# Patient Record
Sex: MALE | Race: WHITE | NOT HISPANIC OR LATINO | Employment: OTHER | URBAN - METROPOLITAN AREA
[De-identification: names, ages, dates, MRNs, and addresses within clinical notes are randomized per-mention and may not be internally consistent; named-entity substitution may affect disease eponyms.]

---

## 2021-02-04 ENCOUNTER — OFFICE VISIT (OUTPATIENT)
Dept: INTERNAL MEDICINE | Facility: CLINIC | Age: 66
End: 2021-02-04

## 2021-02-04 ENCOUNTER — LAB (OUTPATIENT)
Dept: LAB | Facility: HOSPITAL | Age: 66
End: 2021-02-04

## 2021-02-04 VITALS
DIASTOLIC BLOOD PRESSURE: 92 MMHG | HEIGHT: 72 IN | SYSTOLIC BLOOD PRESSURE: 152 MMHG | OXYGEN SATURATION: 98 % | HEART RATE: 65 BPM | TEMPERATURE: 97.5 F | WEIGHT: 227.2 LBS | BODY MASS INDEX: 30.77 KG/M2

## 2021-02-04 DIAGNOSIS — Z11.59 NEED FOR HEPATITIS C SCREENING TEST: ICD-10-CM

## 2021-02-04 DIAGNOSIS — J30.1 SEASONAL ALLERGIC RHINITIS DUE TO POLLEN: ICD-10-CM

## 2021-02-04 DIAGNOSIS — E78.2 MIXED HYPERLIPIDEMIA: ICD-10-CM

## 2021-02-04 DIAGNOSIS — I10 ESSENTIAL HYPERTENSION: ICD-10-CM

## 2021-02-04 DIAGNOSIS — E11.9 TYPE 2 DIABETES MELLITUS WITHOUT COMPLICATION, WITHOUT LONG-TERM CURRENT USE OF INSULIN (HCC): ICD-10-CM

## 2021-02-04 DIAGNOSIS — E66.9 OBESITY (BMI 30.0-34.9): ICD-10-CM

## 2021-02-04 DIAGNOSIS — Z86.79 HISTORY OF CARDIAC MURMUR: ICD-10-CM

## 2021-02-04 DIAGNOSIS — E11.9 TYPE 2 DIABETES MELLITUS WITHOUT COMPLICATION, WITHOUT LONG-TERM CURRENT USE OF INSULIN (HCC): Primary | ICD-10-CM

## 2021-02-04 DIAGNOSIS — Z71.89 ADVANCE CARE PLANNING: ICD-10-CM

## 2021-02-04 DIAGNOSIS — K21.9 GASTROESOPHAGEAL REFLUX DISEASE WITHOUT ESOPHAGITIS: ICD-10-CM

## 2021-02-04 DIAGNOSIS — Z12.11 SCREEN FOR COLON CANCER: ICD-10-CM

## 2021-02-04 PROBLEM — E66.811 OBESITY (BMI 30.0-34.9): Status: ACTIVE | Noted: 2021-02-04

## 2021-02-04 LAB
ALBUMIN SERPL-MCNC: 4.6 G/DL (ref 3.5–5.2)
ALBUMIN/GLOB SERPL: 1.6 G/DL
ALP SERPL-CCNC: 34 U/L (ref 39–117)
ALT SERPL W P-5'-P-CCNC: 34 U/L (ref 1–41)
ANION GAP SERPL CALCULATED.3IONS-SCNC: 9.7 MMOL/L (ref 5–15)
AST SERPL-CCNC: 25 U/L (ref 1–40)
BILIRUB SERPL-MCNC: 0.3 MG/DL (ref 0–1.2)
BUN SERPL-MCNC: 17 MG/DL (ref 8–23)
BUN/CREAT SERPL: 16.8 (ref 7–25)
CALCIUM SPEC-SCNC: 9.9 MG/DL (ref 8.6–10.5)
CHLORIDE SERPL-SCNC: 103 MMOL/L (ref 98–107)
CHOLEST SERPL-MCNC: 193 MG/DL (ref 0–200)
CO2 SERPL-SCNC: 26.3 MMOL/L (ref 22–29)
CREAT SERPL-MCNC: 1.01 MG/DL (ref 0.76–1.27)
GFR SERPL CREATININE-BSD FRML MDRD: 74 ML/MIN/1.73
GLOBULIN UR ELPH-MCNC: 2.8 GM/DL
GLUCOSE SERPL-MCNC: 134 MG/DL (ref 65–99)
HBA1C MFR BLD: 7.1 % (ref 4.8–5.6)
HCV AB SER DONR QL: NORMAL
HDLC SERPL-MCNC: 59 MG/DL (ref 40–60)
LDLC SERPL CALC-MCNC: 115 MG/DL (ref 0–100)
LDLC/HDLC SERPL: 1.92 {RATIO}
POTASSIUM SERPL-SCNC: 4.5 MMOL/L (ref 3.5–5.2)
PROT SERPL-MCNC: 7.4 G/DL (ref 6–8.5)
SODIUM SERPL-SCNC: 139 MMOL/L (ref 136–145)
TRIGL SERPL-MCNC: 105 MG/DL (ref 0–150)
VLDLC SERPL-MCNC: 19 MG/DL (ref 5–40)

## 2021-02-04 PROCEDURE — 86803 HEPATITIS C AB TEST: CPT

## 2021-02-04 PROCEDURE — 80061 LIPID PANEL: CPT

## 2021-02-04 PROCEDURE — 82043 UR ALBUMIN QUANTITATIVE: CPT

## 2021-02-04 PROCEDURE — 83036 HEMOGLOBIN GLYCOSYLATED A1C: CPT

## 2021-02-04 PROCEDURE — 99204 OFFICE O/P NEW MOD 45 MIN: CPT | Performed by: STUDENT IN AN ORGANIZED HEALTH CARE EDUCATION/TRAINING PROGRAM

## 2021-02-04 PROCEDURE — 80053 COMPREHEN METABOLIC PANEL: CPT

## 2021-02-04 RX ORDER — EZETIMIBE 10 MG/1
10 TABLET ORAL DAILY
COMMUNITY
Start: 2021-01-07 | End: 2022-05-05 | Stop reason: SDUPTHER

## 2021-02-04 RX ORDER — GLIMEPIRIDE 2 MG/1
TABLET ORAL
COMMUNITY
Start: 2021-01-08 | End: 2021-06-04 | Stop reason: SDUPTHER

## 2021-02-04 RX ORDER — BLOOD SUGAR DIAGNOSTIC
STRIP MISCELLANEOUS
COMMUNITY
Start: 2021-01-11 | End: 2021-12-16 | Stop reason: SDUPTHER

## 2021-02-04 RX ORDER — MONTELUKAST SODIUM 10 MG/1
TABLET ORAL DAILY
COMMUNITY
Start: 2021-01-11 | End: 2021-08-09 | Stop reason: SDUPTHER

## 2021-02-04 RX ORDER — OMEPRAZOLE 20 MG/1
1 CAPSULE, DELAYED RELEASE ORAL DAILY
COMMUNITY
Start: 2021-01-19 | End: 2021-11-04 | Stop reason: SDUPTHER

## 2021-02-04 RX ORDER — LISINOPRIL 10 MG/1
2 TABLET ORAL DAILY
COMMUNITY
Start: 2021-01-19 | End: 2021-03-04 | Stop reason: DRUGHIGH

## 2021-02-04 NOTE — PROGRESS NOTES
History of Present Illness  Rohan Evans is a 65 y.o. male presenting for Diabetes (establish care ) and Hypertension.     Retired as  from the [employer]. . 2 children daughter born 1979 and son born 1981.    Patient is here to [facility].  Has a past medical history of T2DM without complications, hypertension, hyperlipidemia, GERD, systolic murmur and allergic rhinitis during spring/summer    Currently has no specific complaints.  Overall he feels well.  He never did EGD, but is on PPI for his GERD.    During the pollen season he takes montelukast for nasal congestion and itching of his eyes while mowing the lawn.  No history of asthma.    He was found to have a heart murmur likely early 20s.  He did echocardiogram and has done multiple stress test, he states all normal.  Does not follow with cardiology.    He was diagnosed with T2DM about 10 years ago, his last A1c was less than 7.  He was recently seen by eye doctor at [facility] in [place], he will sign for records release.    His last colonoscopy was on 10 years ago, but is normal.  He would like to have a new one done.    His blood pressure has been well controlled, he did not take today's medication, but states his home blood pressure measurements are consistently below 140/90.    The following portions of the patient's history were reviewed and updated as appropriate: allergies, current medications, past family history, past medical history, past social history, past surgical history and problem list.    Review of Systems   Constitutional: Negative for fever.   HENT: Negative for congestion.    Eyes: Negative for visual disturbance.   Respiratory: Negative for shortness of breath.    Cardiovascular: Negative for chest pain.   Gastrointestinal: Negative for abdominal pain.   Genitourinary: Negative for dysuria.   Skin: Negative for rash.   Neurological: Negative for weakness.   Psychiatric/Behavioral: Negative for depressed  "mood.     Outpatient Medications Marked as Taking for the 2/4/21 encounter (Office Visit) with Sav Mulligan MD   Medication Sig Dispense Refill   • ezetimibe (ZETIA) 10 MG tablet Take 10 mg by mouth Daily.     • glimepiride (AMARYL) 2 MG tablet TAKE 1 TABLET BY MOUTH TWICE A DAY 1/2 HOUR BEFORE MEALS.     • lisinopril (PRINIVIL,ZESTRIL) 10 MG tablet 2 tablets Daily.     • metFORMIN (GLUCOPHAGE) 500 MG tablet 1 tablet 2 (two) times a day.     • montelukast (SINGULAIR) 10 MG tablet Daily.     • omeprazole (priLOSEC) 20 MG capsule 1 capsule Daily.     • OneTouch Verio test strip            Objective  /92 (BP Location: Left arm, Patient Position: Sitting, Cuff Size: Adult)   Pulse 65   Temp 97.5 °F (36.4 °C) (Temporal)   Ht 182.9 cm (72\") Comment: per patient  Wt 103 kg (227 lb 3.2 oz)   SpO2 98%   BMI 30.81 kg/m²     Physical Exam  Vitals signs reviewed.   Constitutional:       Appearance: Normal appearance.   HENT:      Head: Normocephalic and atraumatic.      Nose: No congestion.   Eyes:      Extraocular Movements: Extraocular movements intact.      Conjunctiva/sclera: Conjunctivae normal.   Neck:      Musculoskeletal: Neck supple.   Cardiovascular:      Rate and Rhythm: Normal rate and regular rhythm.      Pulses:           Dorsalis pedis pulses are 1+ on the right side and 1+ on the left side.        Posterior tibial pulses are 1+ on the right side and 1+ on the left side.      Heart sounds: Murmur present.      Comments: Systolic grade 2-3/6.  Pulmonary:      Effort: Pulmonary effort is normal.      Breath sounds: Normal breath sounds.   Abdominal:      General: There is no distension.      Palpations: Abdomen is soft. There is no mass.      Tenderness: There is no abdominal tenderness.   Musculoskeletal:      Right lower leg: No edema.      Left lower leg: No edema.      Right foot: Normal range of motion.      Left foot: Normal range of motion.   Feet:      Right foot:      Protective " Sensation: 10 sites tested. 10 sites sensed.      Skin integrity: Skin integrity normal. No ulcer or skin breakdown.      Toenail Condition: Right toenails are normal.      Left foot:      Protective Sensation: 10 sites tested. 10 sites sensed.      Skin integrity: Skin integrity normal. No ulcer or skin breakdown.      Toenail Condition: Left toenails are normal.      Comments: Diabetic Foot Exam Performed and Monofilament Test Performed  Mildly decreased pulses, otherwise normal foot exam.  Skin:     General: Skin is warm and dry.   Neurological:      Mental Status: He is alert and oriented to person, place, and time. Mental status is at baseline.   Psychiatric:         Behavior: Behavior normal.         Thought Content: Thought content normal.         Assessment/Plan   1. Type 2 diabetes mellitus without complication, without long-term current use of insulin (CMS/[facility])  Likely well controlled.  Continue current regimen.  - Comprehensive Metabolic Panel; Future  - MicroAlbumin, Urine, Random - Urine, Clean Catch; Future  - Hemoglobin A1c; Future    2. Essential hypertension  BP Readings from Last 3 Encounters:   02/04/21 152/92   Not at goal, which would be below 140/90.  Did not take his medication today.  Will return for repeat and annual Medicare visit in 1 months.    3. Mixed hyperlipidemia  Fasting blood test collected today.  - Lipid Panel; Future    4. History of cardiac murmur  Asymptomatic and likely physiologic murmur given her work-up in the past and no need for cardiology follow-up.    5. Seasonal allergic rhinitis due to pollen  Continue use of Singulair as needed during season.    6. Advance care planning  Patient has living will, would like to engage in advance care planning.  Advance Care Planning   ACP discussion was held with the patient during this visit. Patient does not have an advance directive, information provided.  - Ambulatory Referral to Advance Care Planning    7. Screen for colon  cancer  Last colonoscopy more than 10 years ago.  - Amb referral for Screening Colonoscopy    8. Gastroesophageal reflux disease without esophagitis  Continue PPI.    9. Need for hepatitis C screening test  Collected today.  - Hepatitis C Antibody; Future    10. Obesity (BMI 30.0-34.9)  Not discussed today    Offered pneumococcus vaccine, he wants to think about it.  Declined flu shot.    Total time spent charting and face-to-face with patient for 6 minutes.    Return in about 4 weeks (around 3/4/2021) for Medicare Wellness.    Future Appointments       Provider Department Center    3/4/2021 10:30 AM Sav Mulligan MD [facility] INTERNAL MEDICINE [place]            Sav Mulligan MD  Family Medicine  02/04/2021    Note: Speech recognition transcription software may have been used to create portions of this document.  An attempt at proofreading has been made but errors in transcription could still be present.

## 2021-02-05 LAB — ALBUMIN UR-MCNC: 2.1 MG/DL

## 2021-02-17 ENCOUNTER — PATIENT MESSAGE (OUTPATIENT)
Dept: INTERNAL MEDICINE | Facility: CLINIC | Age: 66
End: 2021-02-17

## 2021-02-17 DIAGNOSIS — E11.9 TYPE 2 DIABETES MELLITUS WITHOUT COMPLICATION, WITHOUT LONG-TERM CURRENT USE OF INSULIN (HCC): Primary | ICD-10-CM

## 2021-02-17 RX ORDER — FLASH GLUCOSE SCANNING READER
1 EACH MISCELLANEOUS CONTINUOUS PRN
Qty: 1 EACH | Refills: 0 | Status: SHIPPED | OUTPATIENT
Start: 2021-02-17 | End: 2021-02-24 | Stop reason: SDUPTHER

## 2021-02-17 RX ORDER — FLASH GLUCOSE SENSOR
1 KIT MISCELLANEOUS
Qty: 6 EACH | Refills: 3 | Status: SHIPPED | OUTPATIENT
Start: 2021-02-17 | End: 2021-02-24 | Stop reason: SDUPTHER

## 2021-02-17 NOTE — TELEPHONE ENCOUNTER
See my chart message.  Patient requested:    1. Type 2 diabetes mellitus without complication, without long-term current use of insulin (CMS/[facility])  - Continuous Blood Gluc  (FreeStyle Saeid 14 Day [place]) device; 1 Units Continuous As Needed (Blood sugar testng).  Dispense: 1 each; Refill: 0  - Continuous Blood Gluc Sensor (FreeStyle Saeid 14 Day Sensor) misc; 1 Units Every 14 (Fourteen) Days.  Dispense: 6 each; Refill: 3    Sav Mulligan MD

## 2021-02-17 NOTE — TELEPHONE ENCOUNTER
From: Rohan Evans  To: Sav Mulligan MD  Sent: 2/17/2021 3:50 PM EST  Subject: Prescription Question    Good afternoon Dr. Mulligan,  I checked with my insurance to see if a Continuous Glucose Monitor was covered, and it is. I was told that I needed to request a prescription for one from my primary care physician. Could you prescribe one of these monitors for me so that I don't have to use the strips every morning?  Thank you,  Rohan Evans

## 2021-02-22 ENCOUNTER — PRIOR AUTHORIZATION (OUTPATIENT)
Dept: INTERNAL MEDICINE | Facility: CLINIC | Age: 66
End: 2021-02-22

## 2021-02-22 NOTE — TELEPHONE ENCOUNTER
Patient message me last week stating that continuous glucose monitor was covered by his plan, he had check with his insurance.  If there is another brand that would be covered I would be happy to prescribe it for him.  Please check with patient and if possible pend order and I will sign the prescription.  Thanks

## 2021-02-22 NOTE — TELEPHONE ENCOUNTER
Patient is going to call his insurance and see which glucose monitor  is covered and call us back

## 2021-02-22 NOTE — TELEPHONE ENCOUNTER
Pharmacy requested PA on Freestyle 14 Sensor Saeid      [facility]  Price is $62.29    We normally don't do PA's on Diabetic supplies because insurance usually always cover some of a brand of the supplies but will do PA if you want

## 2021-02-24 ENCOUNTER — PRIOR AUTHORIZATION (OUTPATIENT)
Dept: INTERNAL MEDICINE | Facility: CLINIC | Age: 66
End: 2021-02-24

## 2021-02-24 DIAGNOSIS — E11.9 TYPE 2 DIABETES MELLITUS WITHOUT COMPLICATION, WITHOUT LONG-TERM CURRENT USE OF INSULIN (HCC): ICD-10-CM

## 2021-02-24 RX ORDER — FLASH GLUCOSE SCANNING READER
1 EACH MISCELLANEOUS CONTINUOUS PRN
Qty: 1 EACH | Refills: 0 | Status: SHIPPED | OUTPATIENT
Start: 2021-02-24 | End: 2021-09-07

## 2021-02-24 RX ORDER — FLASH GLUCOSE SENSOR
1 KIT MISCELLANEOUS
Qty: 2 EACH | Refills: 0 | Status: SHIPPED | OUTPATIENT
Start: 2021-02-24 | End: 2021-09-07

## 2021-02-24 NOTE — TELEPHONE ENCOUNTER
I have spent almost an hour on this free style maria fernanda. Short story for you is to try and send free style to local pharmacy and see if they can process it under the patients medicare part B. [facility] was not authorized to do this. If this does not work patient would go back to his normal meter and supplies.     I have spent almost an hour on this. I called [facility] [phone] and spoke with Karine. Gave her CPT code 95886 for freestyle maria fernanda 14 day sensor. They stated the sensor does not require prior authorization. I advised Karine the pharmacy was requesting PA and what needed to be done from there. I was transferred to [place] with [facility] pharmacy who notified me the freestyle maria fernanda does not require a PA under medicare Part B. [facility] was processing it under medicare part D. I was then transferred to [facility] [phone] and spoke with Julio C who then transferred me to a [facility] agent Jonah. Jonah advised me that [employer] mail order can not authorize the device through medicare part B but stated that maybe the local pharmacy can.       [facility] Ticket # [id number]  [facility] reference #[id number]

## 2021-02-25 DIAGNOSIS — Z12.11 SCREENING FOR COLON CANCER: Primary | ICD-10-CM

## 2021-02-26 ENCOUNTER — TELEPHONE (OUTPATIENT)
Dept: INTERNAL MEDICINE | Facility: CLINIC | Age: 66
End: 2021-02-26

## 2021-02-26 DIAGNOSIS — Z12.11 SCREENING FOR COLON CANCER: ICD-10-CM

## 2021-02-26 NOTE — TELEPHONE ENCOUNTER
Zofia with [facility] called in regards to freestyle maria fernanda PA under medicare part B. She was wanting to know if the patient is insulin dependent and how often a day he needs to check blood sugar. I told her he is not insulin dependent and to check blood sugar daily. That was all that she needed and the call ended.

## 2021-02-28 ENCOUNTER — APPOINTMENT (OUTPATIENT)
Dept: PREADMISSION TESTING | Facility: HOSPITAL | Age: 66
End: 2021-02-28

## 2021-02-28 PROCEDURE — C9803 HOPD COVID-19 SPEC COLLECT: HCPCS

## 2021-02-28 PROCEDURE — U0004 COV-19 TEST NON-CDC HGH THRU: HCPCS

## 2021-03-01 DIAGNOSIS — Z12.11 SCREENING FOR COLON CANCER: ICD-10-CM

## 2021-03-01 LAB — SARS-COV-2 RNA RESP QL NAA+PROBE: NOT DETECTED

## 2021-03-03 ENCOUNTER — LAB REQUISITION (OUTPATIENT)
Dept: LAB | Facility: HOSPITAL | Age: 66
End: 2021-03-03

## 2021-03-03 ENCOUNTER — OUTSIDE FACILITY SERVICE (OUTPATIENT)
Dept: GASTROENTEROLOGY | Facility: CLINIC | Age: 66
End: 2021-03-03

## 2021-03-03 DIAGNOSIS — Z12.11 ENCOUNTER FOR SCREENING FOR MALIGNANT NEOPLASM OF COLON: ICD-10-CM

## 2021-03-03 PROCEDURE — 45385 COLONOSCOPY W/LESION REMOVAL: CPT | Performed by: INTERNAL MEDICINE

## 2021-03-03 PROCEDURE — 88305 TISSUE EXAM BY PATHOLOGIST: CPT | Performed by: INTERNAL MEDICINE

## 2021-03-04 ENCOUNTER — OFFICE VISIT (OUTPATIENT)
Dept: INTERNAL MEDICINE | Facility: CLINIC | Age: 66
End: 2021-03-04

## 2021-03-04 VITALS
BODY MASS INDEX: 30.96 KG/M2 | SYSTOLIC BLOOD PRESSURE: 150 MMHG | TEMPERATURE: 97.3 F | DIASTOLIC BLOOD PRESSURE: 100 MMHG | OXYGEN SATURATION: 98 % | HEIGHT: 72 IN | WEIGHT: 228.6 LBS | HEART RATE: 68 BPM

## 2021-03-04 DIAGNOSIS — K63.5 POLYP OF COLON, UNSPECIFIED PART OF COLON, UNSPECIFIED TYPE: ICD-10-CM

## 2021-03-04 DIAGNOSIS — Z00.00 MEDICARE ANNUAL WELLNESS VISIT, SUBSEQUENT: Primary | ICD-10-CM

## 2021-03-04 DIAGNOSIS — I10 ESSENTIAL HYPERTENSION: ICD-10-CM

## 2021-03-04 DIAGNOSIS — E66.9 OBESITY (BMI 30.0-34.9): ICD-10-CM

## 2021-03-04 DIAGNOSIS — E11.9 TYPE 2 DIABETES MELLITUS WITHOUT COMPLICATION, WITHOUT LONG-TERM CURRENT USE OF INSULIN (HCC): ICD-10-CM

## 2021-03-04 DIAGNOSIS — Z71.89 ADVANCE CARE PLANNING: ICD-10-CM

## 2021-03-04 PROCEDURE — G0439 PPPS, SUBSEQ VISIT: HCPCS | Performed by: STUDENT IN AN ORGANIZED HEALTH CARE EDUCATION/TRAINING PROGRAM

## 2021-03-04 RX ORDER — ASPIRIN 81 MG/1
81 TABLET ORAL DAILY
Qty: 1 TABLET | Refills: 0 | COMMUNITY
Start: 2021-03-04 | End: 2022-08-02

## 2021-03-04 RX ORDER — LISINOPRIL 20 MG/1
20 TABLET ORAL DAILY
Qty: 180 TABLET | Refills: 3 | Status: SHIPPED | OUTPATIENT
Start: 2021-03-04 | End: 2021-06-04 | Stop reason: SDUPTHER

## 2021-03-04 NOTE — PROGRESS NOTES
QUICK REFERENCE INFORMATION:  The ABCs of the Annual Wellness Visit    Subsequent Medicare Wellness Visit    Patient has a past medical history of T2DM without complications, hypertension, hyperlipidemia, GERD, systolic murmur and allergic rhinitis during spring/summer    Overall patient is doing well.  He checks his blood sugar at home after increasing Metformin, fasting blood sugar typically in the 140s.    HEALTH RISK ASSESSMENT    1955    Recent Hospitalizations:  No hospitalization(s) within the last year..        Current Medical Providers:  Patient Care Team:  Sav Mulligan MD as PCP - General (Family Medicine)        Smoking Status:  Social History     Tobacco Use   Smoking Status Former Smoker   • Packs/day: 0.50   • Years: 20.00   • Pack years: 10.00   • Types: Cigarettes   • Quit date:    • Years since quittin.1   Smokeless Tobacco Never Used       Alcohol Consumption:  Social History     Substance and Sexual Activity   Alcohol Use Yes    Comment: 1 drink occsionally       Depression Screen:   PHQ-2/PHQ-9 Depression Screening 3/4/2021   Little interest or pleasure in doing things 0   Feeling down, depressed, or hopeless 0   Total Score 0       Health Habits and Functional and Cognitive Screening:  Functional & Cognitive Status 3/4/2021   Do you have difficulty preparing food and eating? No   Do you have difficulty bathing yourself, getting dressed or grooming yourself? No   Do you have difficulty using the toilet? No   Do you have difficulty moving around from place to place? No   Do you have trouble with steps or getting out of a bed or a chair? No   Current Diet Well Balanced Diet   Dental Exam Up to date   Eye Exam Up to date   Exercise (times per week) 7 times per week   Current Exercise Activities Include Walking   Do you need help using the phone?  No   Are you deaf or do you have serious difficulty hearing?  No   Do you need help with transportation? No   Do you need help  shopping? No   Do you need help preparing meals?  No   Do you need help with housework?  No   Do you need help with laundry? No   Do you need help taking your medications? No   Do you need help managing money? No   Do you ever drive or ride in a car without wearing a seat belt? No   Have you felt unusual stress, anger or loneliness in the last month? No   Who do you live with? Spouse   If you need help, do you have trouble finding someone available to you? No   Have you been bothered in the last four weeks by sexual problems? No   Do you have difficulty concentrating, remembering or making decisions? No       Fall Risk Screen:  CONNER Fall Risk Assessment was completed, and patient is at LOW risk for falls.Assessment completed on:3/4/2021    ACE III MINI   ATTENTION  What is the year: correct  What is the month of the year: correct  What is the day of the week?: correct  What is the date?: correct  MEMORY  Repeat address three times, only score third attempt: [address], [place]: 6  HOW MANY ANIMALS DID THE PATIENT NAME  Verbal Fluency -- Animal Names (0-25): 17-  CLOCK DRAWING  Clock Drawing: All Correct  MEMORY RECALL  Tell me what you remember about that name and address we were repeating at the beginnin  ACE TOTAL SCORE  Total ACE Score - <25/30 strongly suggests cognitive impairment; <21/30 almost certainly shows dementia: 25    Does the patient have evidence of cognitive impairment? No    Aspirin use counseling: Taking ASA appropriately as indicated    Recent Lab Results:  CMP:  Lab Results   Component Value Date    BUN 17 2021    CREATININE 1.01 2021    EGFRIFNONA 74 2021    BCR 16.8 2021     2021    K 4.5 2021    CO2 26.3 2021    CALCIUM 9.9 2021    ALBUMIN 4.60 2021    BILITOT 0.3 2021    ALKPHOS 34 (L) 2021    AST 25 2021    ALT 34 2021     HbA1c:  Lab Results   Component Value Date     HGBA1C 7.10 (H) 02/04/2021     Microalbumin:  Lab Results   Component Value Date    MICROALBUR 2.1 02/04/2021     Lipid Panel  Lab Results   Component Value Date    CHOL 193 02/04/2021    TRIG 105 02/04/2021    HDL 59 02/04/2021     (H) 02/04/2021    AST 25 02/04/2021    ALT 34 02/04/2021       Visual Acuity:  No exam data present    Age-appropriate Screening Schedule:  Refer to the list below for future screening recommendations based on patient's age, sex and/or medical conditions. Orders for these recommended tests are listed in the plan section. The patient has been provided with a written plan.    Health Maintenance   Topic Date Due   • ZOSTER VACCINE (1 of 2) 06/30/2003   • DIABETIC EYE EXAM  02/04/2021   • TDAP/TD VACCINES (2 - Td) 02/25/2021   • INFLUENZA VACCINE  03/31/2021 (Originally 8/1/2020)   • HEMOGLOBIN A1C  08/04/2021   • DIABETIC FOOT EXAM  02/04/2022   • LIPID PANEL  02/04/2022   • URINE MICROALBUMIN  02/04/2022   • COLONOSCOPY  03/03/2031        Subjective   History of Present Illness    Rohan Evans is a 65 y.o. male who presents for a Subsequent Wellness Visit.    CHRONIC CONDITIONS    The following portions of the patient's history were reviewed and updated as appropriate: allergies, current medications, past family history, past medical history, past social history, past surgical history and problem list.    Outpatient Medications Prior to Visit   Medication Sig Dispense Refill   • Continuous Blood Gluc  (FreeStyle Saeid 14 Day [place]orman) device 1 Units Continuous As Needed (Blood sugar testng). 1 each 0   • Continuous Blood Gluc Sensor (FreeStyle Saeid 14 Day Sensor) misc 1 Units Every 14 (Fourteen) Days. 2 each 0   • ezetimibe (ZETIA) 10 MG tablet Take 10 mg by mouth Daily.     • glimepiride (AMARYL) 2 MG tablet TAKE 1 TABLET BY MOUTH TWICE A DAY 1/2 HOUR BEFORE MEALS.     • montelukast (SINGULAIR) 10 MG tablet Daily.     • omeprazole (priLOSEC) 20 MG capsule 1 capsule Daily.      • OneTouch Verio test strip      • lisinopril (PRINIVIL,ZESTRIL) 10 MG tablet 2 tablets Daily.     • metFORMIN (GLUCOPHAGE) 500 MG tablet 1 tablet 2 (two) times a day. Takes 2 in the morning and 1 at night     • aspirin (aspirin) 81 MG EC tablet Take 1 tablet by mouth Daily. 1 tablet 0   • Sod Picosulfate-Mag Ox-Cit Acd 10-3.5-12 MG-GM -GM/160ML solution Take 1 kit by mouth Take As Directed. Follow instructions that were mailed to your home. If you didn't receive these call [phone]. 320 mL 0     No facility-administered medications prior to visit.        Patient Active Problem List   Diagnosis   • Type 2 diabetes mellitus without complication, without long-term current use of insulin (CMS/[facility])   • Essential hypertension   • Gastroesophageal reflux disease without esophagitis   • Mixed hyperlipidemia   • Seasonal allergic rhinitis due to pollen   • History of cardiac murmur   • Obesity (BMI 30.0-34.9)   • Medicare annual wellness visit, subsequent       Advance Care Planning:  ACP discussion was held with the patient during this visit. Patient does not have an advance directive, information provided.    Identification of Risk Factors:  Risk factors include: Advance Directive Discussion  Dementia/Memory .    Review of Systems   Constitutional: Negative for chills, fatigue and fever.   HENT: Negative for congestion, ear pain and sinus pressure.    Respiratory: Negative for cough, chest tightness, shortness of breath and wheezing.    Cardiovascular: Negative for chest pain and palpitations.   Gastrointestinal: Negative for abdominal pain, blood in stool and constipation.   Skin: Negative for color change.   Allergic/Immunologic: Negative for environmental allergies.   Neurological: Negative for dizziness, speech difficulty and headaches.   Psychiatric/Behavioral: Negative for confusion. The patient is not nervous/anxious.        Compared to one year ago, the patient feels his physical health is the  "same.  Compared to one year ago, the patient feels his mental health is the same.    Objective     Physical Exam  Vitals signs reviewed.   Constitutional:       Appearance: Normal appearance.   HENT:      Head: Normocephalic and atraumatic.      Nose: Nose normal. No congestion.      Mouth/Throat:      Mouth: Mucous membranes are moist.   Eyes:      Extraocular Movements: Extraocular movements intact.      Conjunctiva/sclera: Conjunctivae normal.   Neck:      Musculoskeletal: Neck supple.   Cardiovascular:      Rate and Rhythm: Normal rate and regular rhythm.      Heart sounds: Normal heart sounds. No murmur.   Pulmonary:      Effort: Pulmonary effort is normal.      Breath sounds: Normal breath sounds.   Abdominal:      General: There is no distension.      Palpations: Abdomen is soft. There is no mass.      Tenderness: There is no abdominal tenderness.   Musculoskeletal:      Right lower leg: No edema.      Left lower leg: No edema.   Skin:     General: Skin is warm and dry.   Neurological:      Mental Status: He is alert and oriented to person, place, and time. Mental status is at baseline.   Psychiatric:         Behavior: Behavior normal.         Thought Content: Thought content normal.          Procedures     Vitals:    03/04/21 1026   BP: 150/100   BP Location: Left arm   Patient Position: Sitting   Cuff Size: Adult   Pulse: 68   Temp: 97.3 °F (36.3 °C)   TempSrc: Temporal   SpO2: 98%   Weight: 104 kg (228 lb 9.6 oz)   Height: 182.9 cm (72.01\")   PainSc: 0-No pain       Patient's Body mass index is 31 kg/m². BMI is above normal parameters. Recommendations include: exercise counseling and nutrition counseling.      Assessment/Plan   Problem List Items Addressed This Visit        Unprioritized    Essential hypertension    Relevant Medications    lisinopril (PRINIVIL,ZESTRIL) 20 MG tablet    Medicare annual wellness visit, subsequent - Primary    Obesity (BMI 30.0-34.9)    Type 2 diabetes mellitus without " complication, without long-term current use of insulin (CMS/[facility])    Relevant Medications    glimepiride (AMARYL) 2 MG tablet    Continuous Blood Gluc  (FreeStyle Saeid 14 Day [place]) device    Continuous Blood Gluc Sensor (FreeStyle Saeid 14 Day Sensor) misc    metFORMIN (GLUCOPHAGE) 500 MG tablet      Other Visit Diagnoses     Advance care planning        Relevant Orders    Ambulatory Referral to Advance Care Planning        Patient Self-Management and Personalized Health Advice  The patient has been provided with information about: diet, exercise and weight management and preventive services including:   · Colorectal Cancer Screening, Colonoscopy.    Outpatient Encounter Medications as of 3/4/2021   Medication Sig Dispense Refill   • Continuous Blood Gluc  (FreeStyle Saeid 14 Day Stanton) device 1 Units Continuous As Needed (Blood sugar testng). 1 each 0   • Continuous Blood Gluc Sensor (FreeStyle Saeid 14 Day Sensor) misc 1 Units Every 14 (Fourteen) Days. 2 each 0   • ezetimibe (ZETIA) 10 MG tablet Take 10 mg by mouth Daily.     • glimepiride (AMARYL) 2 MG tablet TAKE 1 TABLET BY MOUTH TWICE A DAY 1/2 HOUR BEFORE MEALS.     • metFORMIN (GLUCOPHAGE) 500 MG tablet Take 2 tablets by mouth 2 (Two) Times a Day With Meals. Takes 2 in the morning and 1 at night 360 tablet 3   • montelukast (SINGULAIR) 10 MG tablet Daily.     • omeprazole (priLOSEC) 20 MG capsule 1 capsule Daily.     • OneTouch Verio test strip      • [DISCONTINUED] lisinopril (PRINIVIL,ZESTRIL) 10 MG tablet 2 tablets Daily.     • [DISCONTINUED] metFORMIN (GLUCOPHAGE) 500 MG tablet 1 tablet 2 (two) times a day. Takes 2 in the morning and 1 at night     • aspirin (aspirin) 81 MG EC tablet Take 1 tablet by mouth Daily. 1 tablet 0   • lisinopril (PRINIVIL,ZESTRIL) 20 MG tablet Take 1 tablet by mouth Daily. 180 tablet 3   • [DISCONTINUED] Sod Picosulfate-Mag Ox-Cit Acd 10-3.5-12 MG-GM -GM/160ML solution Take 1 kit by mouth Take As Directed.  Follow instructions that were mailed to your home. If you didn't receive these call [phone]. 320 mL 0     No facility-administered encounter medications on file as of 3/4/2021.        Reviewed use of high risk medication in the elderly: yes  Reviewed for potential of harmful drug interactions in the elderly: yes    1. Medicare annual wellness visit, subsequent    2. Type 2 diabetes mellitus without complication, without long-term current use of insulin (CMS/[facility])  Hemoglobin A1C   Date Value Ref Range Status   02/04/2021 7.10 (H) 4.80 - 5.60 % Final   Not at goal.  He is tolerating Metformin without GI side effects, reddening is normal, will increase to max dose Metformin.  Return for follow-up in 3 months.  - metFORMIN (GLUCOPHAGE) 500 MG tablet; Take 2 tablets by mouth 2 (Two) Times a Day With Meals. Takes 2 in the morning and 1 at night  Dispense: 360 tablet; Refill: 3    3. Essential hypertension  BP Readings from Last 3 Encounters:   03/04/21 150/100   02/04/21 152/92   Not at goal.  Will increase his lisinopril 10 mg twice daily to 20 mg twice daily.  - lisinopril (PRINIVIL,ZESTRIL) 20 MG tablet; Take 1 tablet by mouth Daily.  Dispense: 180 tablet; Refill: 3    4. Advance care planning  Patient would like to engage in advance care planning.  - Ambulatory Referral to Advance Care Planning    5. Obesity (BMI 30.0-34.9)  Counseling provided, including lifestyle changes, diet, healthy plate, avoid snacking between meals.  Patient typically is more active through the spring and summer, sometimes gains weight during the winter.  Patient is motivated to lose weight.    6. Polyp of colon, unspecified part of colon, unspecified type  He had a colonoscopy yesterday and had a polyp removed.  Unfortunately the prep was not adequate, so they recommend a repeat colonoscopy in 12 to 18 months.  I have changed the modifier to reflect repeat in 1 year, so on his next Medicare visit will order it again.    Follow  Up:  Return in about 3 months (around 6/4/2021) for Recheck.     Future Appointments       Provider Department Center    6/4/2021 10:30 AM Sav Mulligan MD [facility]IC[facility] [place]            There are no Patient Instructions on file for this visit.    An After Visit Summary and PPPS with all of these plans were given to the patient.      Sav Mulligan MD

## 2021-03-05 ENCOUNTER — TELEPHONE (OUTPATIENT)
Dept: INTERNAL MEDICINE | Facility: CLINIC | Age: 66
End: 2021-03-05

## 2021-03-05 LAB
CYTO UR: NORMAL
LAB AP CASE REPORT: NORMAL
LAB AP CLINICAL INFORMATION: NORMAL
PATH REPORT.FINAL DX SPEC: NORMAL
PATH REPORT.GROSS SPEC: NORMAL

## 2021-03-05 NOTE — TELEPHONE ENCOUNTER
[facility] requested clarification on patient's Metformin  500 mg   Sig say's tale 2 tablets 2 times a day with meals. Take 2 in the morning and 1 at night

## 2021-03-10 ENCOUNTER — EPISODE CHANGES (OUTPATIENT)
Dept: CASE MANAGEMENT | Facility: OTHER | Age: 66
End: 2021-03-10

## 2021-03-25 ENCOUNTER — PATIENT OUTREACH (OUTPATIENT)
Dept: CASE MANAGEMENT | Facility: OTHER | Age: 66
End: 2021-03-25

## 2021-03-25 ENCOUNTER — TELEPHONE (OUTPATIENT)
Dept: CASE MANAGEMENT | Facility: OTHER | Age: 66
End: 2021-03-25

## 2021-03-25 NOTE — OUTREACH NOTE
Patient Outreach Note  RN-ACM talked with patient's wife regarding Advance Care Planning per patient's permission. RN-ACM provided assistance with Advance Care Planning to Mr. Evans and wife per provider's request. Mr. Evans states he would like to receive Advance Care Planning materials via mail. Mr. Evans states he will review Advance Care Planning material and agrees to further follow up to assist in completion.  I instructed to call for assistance as needed and contact information provided.Patient's wife verbalized understanding and  states to appreciate outreach. No further questions voiced at this time.    Becca Vizcaino RN  Ambulatory     3/25/2021, 17:15 EDT

## 2021-03-25 NOTE — ACP (ADVANCE CARE PLANNING)
RNKIRTI talked with patient's wife regarding Advance Care Planning per patient's permission. RNKIRTI provided assistance with Advance Care Planning to Mr. Evans and wife per provider's request. Mr. Evans states he would like to receive Advance Care Planning materials via mail. Mr. Evans states he will review Advance Care Planning material and agrees to further follow up to assist in completion.  I instructed to call for assistance as needed and contact information provided.Patient's wife verbalized understanding and  states to appreciate outreach. No further questions voiced at this time.

## 2021-04-23 ENCOUNTER — DOCUMENTATION (OUTPATIENT)
Dept: HOSPICE | Facility: HOSPITAL | Age: 66
End: 2021-04-23

## 2021-04-23 NOTE — ACP (ADVANCE CARE PLANNING)
I contacted  and Mrs Evans to follow up on advance care planning conversation initiated by ambulatory , and to offer assistance with advance directive completion. Mrs Evans stated she and patient have received materials and have not had time to look over it. She stated it is in their plans to do so.    I instructed Mrs Evans to contact advance care planning with number provided on the planning guide, for assistance with ACP discussions and completion of advance directive. She voiced understanding.

## 2021-06-04 ENCOUNTER — OFFICE VISIT (OUTPATIENT)
Dept: INTERNAL MEDICINE | Facility: CLINIC | Age: 66
End: 2021-06-04

## 2021-06-04 VITALS
WEIGHT: 225 LBS | HEART RATE: 59 BPM | TEMPERATURE: 98.2 F | SYSTOLIC BLOOD PRESSURE: 142 MMHG | BODY MASS INDEX: 30.48 KG/M2 | DIASTOLIC BLOOD PRESSURE: 86 MMHG | HEIGHT: 72 IN

## 2021-06-04 DIAGNOSIS — Z71.89 ADVANCE CARE PLANNING: ICD-10-CM

## 2021-06-04 DIAGNOSIS — E11.9 TYPE 2 DIABETES MELLITUS WITHOUT COMPLICATION, WITHOUT LONG-TERM CURRENT USE OF INSULIN (HCC): Primary | ICD-10-CM

## 2021-06-04 DIAGNOSIS — N52.2 DRUG-INDUCED ERECTILE DYSFUNCTION: ICD-10-CM

## 2021-06-04 DIAGNOSIS — I10 ESSENTIAL HYPERTENSION: ICD-10-CM

## 2021-06-04 DIAGNOSIS — E66.9 OBESITY (BMI 30.0-34.9): ICD-10-CM

## 2021-06-04 DIAGNOSIS — E78.2 MIXED HYPERLIPIDEMIA: ICD-10-CM

## 2021-06-04 DIAGNOSIS — K63.5 POLYP OF COLON, UNSPECIFIED PART OF COLON, UNSPECIFIED TYPE: ICD-10-CM

## 2021-06-04 DIAGNOSIS — Z86.79 HISTORY OF CARDIAC MURMUR: ICD-10-CM

## 2021-06-04 LAB — HBA1C MFR BLD: 7.7 %

## 2021-06-04 PROCEDURE — 83036 HEMOGLOBIN GLYCOSYLATED A1C: CPT | Performed by: STUDENT IN AN ORGANIZED HEALTH CARE EDUCATION/TRAINING PROGRAM

## 2021-06-04 PROCEDURE — 99215 OFFICE O/P EST HI 40 MIN: CPT | Performed by: STUDENT IN AN ORGANIZED HEALTH CARE EDUCATION/TRAINING PROGRAM

## 2021-06-04 RX ORDER — LISINOPRIL 20 MG/1
20 TABLET ORAL DAILY
Qty: 90 TABLET | Refills: 3 | Status: SHIPPED | OUTPATIENT
Start: 2021-06-04 | End: 2022-09-20

## 2021-06-04 RX ORDER — AMLODIPINE BESYLATE 5 MG/1
5 TABLET ORAL DAILY
Qty: 90 TABLET | Refills: 1 | Status: SHIPPED | OUTPATIENT
Start: 2021-06-04 | End: 2021-10-27 | Stop reason: SDUPTHER

## 2021-06-04 RX ORDER — GLIMEPIRIDE 4 MG/1
4 TABLET ORAL
Qty: 90 TABLET | Refills: 1 | Status: SHIPPED | OUTPATIENT
Start: 2021-06-04 | End: 2021-09-07 | Stop reason: ALTCHOICE

## 2021-06-04 NOTE — PROGRESS NOTES
"Chief Complaint  Rohan Evans is a 65 y.o. male presenting for Diabetes (follow up).     Patient has a past medical history of T2DM without complications, hypertension, hyperlipidemia, GERD, systolic murmur and allergic rhinitis during spring/summer    History of Present Illness  Patient is here for follow-up of his diabetes and hypertension.  On his last visit we increased his Metformin from 500 mg twice daily to 1000 mg twice daily, he has tolerated this well.  He is also on glimepiride 4 mg.    On his last visit we did also increase his lisinopril from 10mg to 20 mg twice daily, but since that time he has noticed some new onset of erectile dysfunction.  He has not checked his blood pressures at home, but has a wrist monitor.       The following portions of the patient's history were reviewed and updated as appropriate: allergies, current medications, past family history, past medical history, past social history, past surgical history and problem list.    Objective  /86   Pulse 59   Temp 98.2 °F (36.8 °C)   Ht 182.9 cm (72.01\")   Wt 102 kg (225 lb)   BMI 30.51 kg/m²     Physical Exam  Vitals reviewed.   Constitutional:       Appearance: Normal appearance.   HENT:      Head: Normocephalic and atraumatic.      Nose: No congestion.   Eyes:      Extraocular Movements: Extraocular movements intact.      Conjunctiva/sclera: Conjunctivae normal.   Cardiovascular:      Rate and Rhythm: Normal rate and regular rhythm.      Heart sounds: Murmur heard.     Pulmonary:      Effort: Pulmonary effort is normal.      Breath sounds: Normal breath sounds.   Musculoskeletal:      Cervical back: Neck supple.      Right lower leg: No edema.      Left lower leg: No edema.   Skin:     General: Skin is warm and dry.   Neurological:      Mental Status: He is alert and oriented to person, place, and time. Mental status is at baseline.   Psychiatric:         Behavior: Behavior normal.         Thought Content: Thought content " normal.         Assessment/Plan   1. Type 2 diabetes mellitus without complication, without long-term current use of insulin (CMS/[facility])  Hemoglobin A1C   Date Value Ref Range Status   06/04/2021 7.7 % Final   02/04/2021 7.10 (H) 4.80 - 5.60 % Final   Despite increase of Metformin his glycemic control has worsened.  His home glucose fasting typically range between 120s to 140.  Because of this I recommend increasing his glimepiride from 2 mg to 4 mg.  I have also counseled him as per obesity below.  - POC Glycosylated Hemoglobin (Hb A1C)  - glimepiride (AMARYL) 4 MG tablet; Take 1 tablet by mouth Every Morning Before Breakfast.  Dispense: 90 tablet; Refill: 1    2. Essential hypertension  BP Readings from Last 3 Encounters:   06/04/21 142/86   03/04/21 150/100   02/04/21 152/92   Blood pressure significantly improved, but still not at goal.  Unfortunately he has side effects possibly related to higher dose of lisinopril, so we will go down from 20 mg twice daily to once daily, and add amlodipine 5 mg daily.  Patient also made aware that any blood pressure medications can cause erectile dysfunction.  Also reviewed side effects of amlodipine including lower extremity edema.  - lisinopril (PRINIVIL,ZESTRIL) 20 MG tablet; Take 1 tablet by mouth Daily.  Dispense: 90 tablet; Refill: 3  - amLODIPine (NORVASC) 5 MG tablet; Take 1 tablet by mouth Daily.  Dispense: 90 tablet; Refill: 1    3. Obesity (BMI 30.0-34.9)  Patient's Body mass index is 30.51 kg/m². indicating that he is obese (BMI >30). Obesity-related health conditions include the following: hypertension, diabetes mellitus, dyslipidemias and GERD. Obesity is improving with lifestyle modifications. BMI is is above average; BMI management plan is completed. We discussed portion control, increasing exercise and Reviewed healthy plate, avoiding snacking between meals, recommend regular small meals...      4. Advance care planning  Advance Care Planning   ACP discussion  was held with the patient during this visit. Patient does not have an advance directive, information provided.  Patient and his wife is working on their living will, he will bring it in to be scanned when completed.    5. Mixed hyperlipidemia  Continue on Zetia    6. History of cardiac murmur  Worked up in the past per patient.  No current symptoms.    7. Polyp of colon, unspecified part of colon, unspecified type  We will need repeat colonoscopy summer 2022 due to poor bowel prep.  Patient made aware.    Total time spent on chart review, charting and face-to-face with patient 45 minutes.    Return in about 3 months (around 9/4/2021) for Recheck + A1c.    Future Appointments       Provider Department Center    9/7/2021 9:30 AM Sav Mulligan MD [facility] MEDIC[facility] [place]          Sav Mulligan MD  Family Medicine  06/04/2021    Note: Speech recognition transcription software may have been used to create portions of this document.  An attempt at proofreading has been made but errors in transcription could still be present.

## 2021-09-07 ENCOUNTER — LAB (OUTPATIENT)
Dept: LAB | Facility: HOSPITAL | Age: 66
End: 2021-09-07

## 2021-09-07 ENCOUNTER — OFFICE VISIT (OUTPATIENT)
Dept: INTERNAL MEDICINE | Facility: CLINIC | Age: 66
End: 2021-09-07

## 2021-09-07 VITALS
WEIGHT: 224.4 LBS | BODY MASS INDEX: 30.4 KG/M2 | SYSTOLIC BLOOD PRESSURE: 130 MMHG | HEIGHT: 72 IN | HEART RATE: 67 BPM | TEMPERATURE: 98.1 F | DIASTOLIC BLOOD PRESSURE: 76 MMHG | OXYGEN SATURATION: 98 %

## 2021-09-07 DIAGNOSIS — Z86.79 HISTORY OF CARDIAC MURMUR: ICD-10-CM

## 2021-09-07 DIAGNOSIS — I10 ESSENTIAL HYPERTENSION: Chronic | ICD-10-CM

## 2021-09-07 DIAGNOSIS — E11.9 TYPE 2 DIABETES MELLITUS WITHOUT COMPLICATION, WITHOUT LONG-TERM CURRENT USE OF INSULIN (HCC): Primary | Chronic | ICD-10-CM

## 2021-09-07 LAB — HBA1C MFR BLD: 7.8 %

## 2021-09-07 PROCEDURE — 99214 OFFICE O/P EST MOD 30 MIN: CPT | Performed by: STUDENT IN AN ORGANIZED HEALTH CARE EDUCATION/TRAINING PROGRAM

## 2021-09-07 PROCEDURE — 80053 COMPREHEN METABOLIC PANEL: CPT

## 2021-09-07 NOTE — PROGRESS NOTES
"Chief Complaint  Rohan Evans is a 66 y.o. male presenting for Diabetes (3 mo. f/u).     Patient has a past medical history of T2DM without complications, hypertension, hyperlipidemia, GERD, systolic murmur and allergic rhinitis during spring/summer    History of Present Illness  Patient is here for 3-month follow-up of his T2DM and hypertension.    On his last visit we did add amlodipine 5 mg to his blood pressure medication, he has tolerated well without any side effects, did not notice any swelling of his ankles.  His home blood pressures have been 118-121 / 70. Pulse 63-68.    We also did increase his Metformin to 1000 mg twice daily.  Of note he was also on glimepiride 2 mg twice daily, and we did change it to 4 mg in the morning.  His blood sugar fasting typically range in the 130-140s.     The following portions of the patient's history were reviewed and updated as appropriate: allergies, current medications, past family history, past medical history, past social history, past surgical history and problem list.    Objective  /76 (BP Location: Left arm, Patient Position: Sitting, Cuff Size: Adult)   Pulse 67   Temp 98.1 °F (36.7 °C) (Temporal)   Ht 182.9 cm (72.01\")   Wt 102 kg (224 lb 6.4 oz)   SpO2 98%   BMI 30.43 kg/m²     Physical Exam  Vitals reviewed.   Constitutional:       Appearance: Normal appearance.   HENT:      Head: Normocephalic and atraumatic.      Nose: No congestion.   Eyes:      Extraocular Movements: Extraocular movements intact.      Conjunctiva/sclera: Conjunctivae normal.   Cardiovascular:      Rate and Rhythm: Normal rate and regular rhythm.      Heart sounds: Murmur heard.        Comments: Systolic murmur 2-3/6.  Pulmonary:      Effort: Pulmonary effort is normal.      Breath sounds: Normal breath sounds.   Musculoskeletal:      Cervical back: Neck supple.      Right lower leg: No edema.      Left lower leg: No edema.   Skin:     General: Skin is warm and dry. "   Neurological:      Mental Status: He is alert and oriented to person, place, and time. Mental status is at baseline.   Psychiatric:         Behavior: Behavior normal.         Thought Content: Thought content normal.         Assessment/Plan   1. Type 2 diabetes mellitus without complication, without long-term current use of insulin (CMS/[facility])  Hemoglobin A1C   Date Value Ref Range Status   09/07/2021 7.8 % Final   06/04/2021 7.7 % Final   02/04/2021 7.10 (H) 4.80 - 5.60 % Final   His goal is A1c below 7.  Stable from prior.  I recommend discontinuing glimepiride and adding Januvia 50 mg.  Counseled on side effects.  Patient will continue to monitor his blood sugar, if it starts to rise he will send me a message and we can increase Januvia.  - POC Glycosylated Hemoglobin (Hb A1C)  - SITagliptin (Januvia) 50 MG tablet; Take 1 tablet by mouth Daily.  Dispense: 90 tablet; Refill: 0    2. Essential hypertension  BP Readings from Last 3 Encounters:   09/07/21 130/76   06/04/21 142/86   03/04/21 150/100   Blood pressure now at goal.  Normal levels at home.  We will check labs as ordered.  - Comprehensive Metabolic Panel; Future    3. History of cardiac murmur  Stable.  No symptoms.      Return in about 3 months (around 12/6/2021) for Recheck.    Future Appointments       Provider Department Center    12/7/2021 9:30 AM Sav Mulligan MD [facility] INTERNAL MEDICINE [place]          Sav Mulligan MD  Family Medicine  09/07/2021    Note: Speech recognition transcription software may have been used to create portions of this document.  An attempt at proofreading has been made but errors in transcription could still be present.  Answers for HPI/ROS submitted by the patient on 9/7/2021  What is the primary reason for your visit?: Other  Please describe your symptoms.: Recheck and lab work  Have you had these symptoms before?: No  How long have you been having these symptoms?: Greater than 2 weeks

## 2021-09-08 PROBLEM — R74.8 ELEVATED LIVER ENZYMES: Status: ACTIVE | Noted: 2021-09-08

## 2021-09-08 LAB
ALBUMIN SERPL-MCNC: 4.6 G/DL (ref 3.5–5.2)
ALBUMIN/GLOB SERPL: 1.8 G/DL
ALP SERPL-CCNC: 34 U/L (ref 39–117)
ALT SERPL W P-5'-P-CCNC: 55 U/L (ref 1–41)
ANION GAP SERPL CALCULATED.3IONS-SCNC: 11 MMOL/L (ref 5–15)
AST SERPL-CCNC: 42 U/L (ref 1–40)
BILIRUB SERPL-MCNC: 0.4 MG/DL (ref 0–1.2)
BUN SERPL-MCNC: 21 MG/DL (ref 8–23)
BUN/CREAT SERPL: 22.1 (ref 7–25)
CALCIUM SPEC-SCNC: 9.5 MG/DL (ref 8.6–10.5)
CHLORIDE SERPL-SCNC: 105 MMOL/L (ref 98–107)
CO2 SERPL-SCNC: 24 MMOL/L (ref 22–29)
CREAT SERPL-MCNC: 0.95 MG/DL (ref 0.76–1.27)
GFR SERPL CREATININE-BSD FRML MDRD: 79 ML/MIN/1.73
GLOBULIN UR ELPH-MCNC: 2.5 GM/DL
GLUCOSE SERPL-MCNC: 128 MG/DL (ref 65–99)
POTASSIUM SERPL-SCNC: 4.8 MMOL/L (ref 3.5–5.2)
PROT SERPL-MCNC: 7.1 G/DL (ref 6–8.5)
SODIUM SERPL-SCNC: 140 MMOL/L (ref 136–145)

## 2021-10-27 DIAGNOSIS — I10 ESSENTIAL HYPERTENSION: ICD-10-CM

## 2021-10-27 RX ORDER — AMLODIPINE BESYLATE 5 MG/1
5 TABLET ORAL DAILY
Qty: 90 TABLET | Refills: 1 | Status: SHIPPED | OUTPATIENT
Start: 2021-10-27 | End: 2022-06-09

## 2021-11-02 DIAGNOSIS — E11.9 TYPE 2 DIABETES MELLITUS WITHOUT COMPLICATION, WITHOUT LONG-TERM CURRENT USE OF INSULIN (HCC): ICD-10-CM

## 2021-11-03 ENCOUNTER — PATIENT MESSAGE (OUTPATIENT)
Dept: INTERNAL MEDICINE | Facility: CLINIC | Age: 66
End: 2021-11-03

## 2021-11-03 DIAGNOSIS — K21.9 GASTROESOPHAGEAL REFLUX DISEASE WITHOUT ESOPHAGITIS: Primary | ICD-10-CM

## 2021-11-03 RX ORDER — GLIMEPIRIDE 4 MG/1
TABLET ORAL
Qty: 90 TABLET | Refills: 1 | Status: SHIPPED | OUTPATIENT
Start: 2021-11-03 | End: 2021-12-22 | Stop reason: ALTCHOICE

## 2021-11-04 RX ORDER — OMEPRAZOLE 20 MG/1
20 CAPSULE, DELAYED RELEASE ORAL DAILY
Qty: 90 CAPSULE | Refills: 1 | Status: SHIPPED | OUTPATIENT
Start: 2021-11-04 | End: 2022-05-12

## 2021-12-07 ENCOUNTER — OFFICE VISIT (OUTPATIENT)
Dept: INTERNAL MEDICINE | Facility: CLINIC | Age: 66
End: 2021-12-07

## 2021-12-07 VITALS
WEIGHT: 225.2 LBS | BODY MASS INDEX: 30.5 KG/M2 | TEMPERATURE: 98.4 F | HEART RATE: 60 BPM | SYSTOLIC BLOOD PRESSURE: 138 MMHG | DIASTOLIC BLOOD PRESSURE: 84 MMHG | OXYGEN SATURATION: 99 % | HEIGHT: 72 IN

## 2021-12-07 DIAGNOSIS — Z79.4 TYPE 2 DIABETES MELLITUS WITHOUT COMPLICATION, WITH LONG-TERM CURRENT USE OF INSULIN (HCC): Primary | ICD-10-CM

## 2021-12-07 DIAGNOSIS — I10 ESSENTIAL HYPERTENSION: Chronic | ICD-10-CM

## 2021-12-07 DIAGNOSIS — E11.9 TYPE 2 DIABETES MELLITUS WITHOUT COMPLICATION, WITH LONG-TERM CURRENT USE OF INSULIN (HCC): Primary | ICD-10-CM

## 2021-12-07 LAB
EXPIRATION DATE: NORMAL
HBA1C MFR BLD: 8.1 %
Lab: NORMAL

## 2021-12-07 PROCEDURE — 99215 OFFICE O/P EST HI 40 MIN: CPT | Performed by: STUDENT IN AN ORGANIZED HEALTH CARE EDUCATION/TRAINING PROGRAM

## 2021-12-07 PROCEDURE — G0009 ADMIN PNEUMOCOCCAL VACCINE: HCPCS | Performed by: STUDENT IN AN ORGANIZED HEALTH CARE EDUCATION/TRAINING PROGRAM

## 2021-12-07 PROCEDURE — 83036 HEMOGLOBIN GLYCOSYLATED A1C: CPT | Performed by: STUDENT IN AN ORGANIZED HEALTH CARE EDUCATION/TRAINING PROGRAM

## 2021-12-07 PROCEDURE — 3052F HG A1C>EQUAL 8.0%<EQUAL 9.0%: CPT | Performed by: STUDENT IN AN ORGANIZED HEALTH CARE EDUCATION/TRAINING PROGRAM

## 2021-12-07 PROCEDURE — 90670 PCV13 VACCINE IM: CPT | Performed by: STUDENT IN AN ORGANIZED HEALTH CARE EDUCATION/TRAINING PROGRAM

## 2021-12-07 RX ORDER — PEN NEEDLE, DIABETIC 32 GX 1/4"
1 NEEDLE, DISPOSABLE MISCELLANEOUS NIGHTLY
Qty: 100 EACH | Refills: 3 | Status: SHIPPED | OUTPATIENT
Start: 2021-12-07 | End: 2022-02-07 | Stop reason: SDUPTHER

## 2021-12-07 RX ORDER — INSULIN GLARGINE 100 [IU]/ML
12 INJECTION, SOLUTION SUBCUTANEOUS NIGHTLY
Qty: 2 PEN | Refills: 0 | Status: SHIPPED | OUTPATIENT
Start: 2021-12-07 | End: 2021-12-22

## 2021-12-07 NOTE — PROGRESS NOTES
"Chief Complaint  Rohan Evans is a 66 y.o. male presenting for Diabetes (3 mo f/u).     Patient has a past medical history of T2DM without complications, hypertension, hyperlipidemia, GERD, systolic murmur and allergic rhinitis during spring/summer    History of Present Illness  Patient is here for follow-up of his T2DM.    He has been checking his home glucose 140-170 am fasting.  He was started on Januvia, but did feel it made him a little bit shaky at times.  Instead he has increased his glipizide again from 4 mg in the morning to 4+2 and then 4+4 mg due to blood sugar getting up into the low 200s.  Most recently it has stabilized in the mid 100s.    Patient continues to check his home BP which is typically 120s/70s.    The following portions of the patient's history were reviewed and updated as appropriate: allergies, current medications, past family history, past medical history, past social history, past surgical history and problem list.    Objective  /84 (BP Location: Left arm, Patient Position: Sitting, Cuff Size: Large Adult)   Pulse 60   Temp 98.4 °F (36.9 °C) (Temporal)   Ht 182.9 cm (72.01\")   Wt 102 kg (225 lb 3.2 oz)   SpO2 99%   BMI 30.54 kg/m²     Physical Exam  Constitutional:       Appearance: Normal appearance.   HENT:      Head: Normocephalic and atraumatic.      Nose: No congestion.   Eyes:      Extraocular Movements: Extraocular movements intact.      Conjunctiva/sclera: Conjunctivae normal.   Neurological:      Mental Status: He is alert and oriented to person, place, and time. Mental status is at baseline.      Motor: No weakness.      Gait: Gait normal.   Psychiatric:         Behavior: Behavior normal.         Thought Content: Thought content normal.         Assessment/Plan   1. Type 2 diabetes mellitus without complication, without long-term current use of insulin ([facility])  Hemoglobin A1C   Date Value Ref Range Status   12/07/2021 8.1 % Final   09/07/2021 7.8 % Final "   06/04/2021 7.7 % Final   02/04/2021 7.10 (H) 4.80 - 5.60 % Final   His blood sugar is not well controlled, A1c goal is below 7.  We have made a very long discussion today about treatment of his T2DM.  He could potentially add on Januvia again, and there is oral options too.  However I have spent a long time discussing long-acting insulin with him.  He does have 2 meals a day, could potentially make changes and do 3 scheduled meals.  With that we might be able to do just long-acting insulin in addition to the Metformin that he is on 1000 mg twice daily, and he could stop the glimepiride.  I have explained to him that glimepiride can cause very low blood sugars that are difficult to treat and can be dangerous, and insulin and sometimes a better and safer option.  After our long discussion patient would like to proceed with starting insulin.  I will refer him to diabetic educator for teaching and once they have gone over everything he can go ahead and start insulin.  He would take his last dose of glimepiride the day before starting insulin, and then starting insulin in the evening.  I suggest starting at 12 units, but likely he will need more given his current dose of glimepiride.  We will then monitor his fasting blood sugars and after few days message me back so that I can give him advise regarding possible need for increased dosing.  He will return for follow-up visit with me in February, sooner if needed.  - POC Glycosylated Hemoglobin (Hb A1C)  - Insulin Glargine (BASAGLAR KWIKPEN) 100 UNIT/ML injection pen; Inject 12 Units under the skin into the appropriate area as directed Every Night. Stop glimepiride, start insulin next day. Increase in dosing per MD  Dispense: 2 pen; Refill: 0  - Insulin Pen Needle (Novofine Pen Needle) 32G X 6 MM misc; 1 Units Every Night.  Dispense: 100 each; Refill: 3  - Ambulatory Referral to Diabetic Education    2. Essential hypertension  BP Readings from Last 3 Encounters:    12/07/21 138/84   09/07/21 130/76   06/04/21 142/86   Blood pressures at home are well controlled, I suspect there is a component of whitecoat.  We will continue on current medication.    Total time spent on chart review, charting and face-to-face with patient 45 minutes.    Return in about 8 weeks (around 2/1/2022) for Recheck.    Future Appointments       Provider Department Center    2/1/2022 9:00 AM Sav Mulligan MD [facility] INTERNAL MEDICINE [place]          Sav Mulligan MD  Family Medicine  12/07/2021

## 2021-12-07 NOTE — PROGRESS NOTES
Capillary Blood Specimen Collection  Capillary blood collection performed in left middle finger  by Esha Nam MA. Patient tolerated the procedure well without complications.   12/07/21   Esha Nam MA

## 2021-12-13 ENCOUNTER — HOSPITAL ENCOUNTER (OUTPATIENT)
Dept: DIABETES SERVICES | Facility: HOSPITAL | Age: 66
Setting detail: RECURRING SERIES
Discharge: HOME | End: 2021-12-13

## 2021-12-13 NOTE — CONSULTS
Diabetes Education    Patient Name:  Rohan Evans  YOB: 1955  MRN: [id number]  Admit Date:  12/13/2021        Pt attended initial diabetes education class--90 minute individual session with RN and RD via zoom telehealth (video visit). As requested per referral insulin education was completed during visit.  This medical referred consult was provided as a telephone call, tele-health or e-visit, as patient is unable to attend an in-office appointment due to the COVID-19 crisis. Consent for treatment was given verbally.Please see media tab for full assessment and notes if you use EP[employer]. If you are not an EP[employer] user a copy of patient's assessment and notes will be sent per routine. Thank you for the referral!       Electronically signed by:  Mally Dan RN, [facility]  12/13/21 11:29 EST

## 2021-12-16 ENCOUNTER — PATIENT MESSAGE (OUTPATIENT)
Dept: INTERNAL MEDICINE | Facility: CLINIC | Age: 66
End: 2021-12-16

## 2021-12-16 DIAGNOSIS — Z79.4 TYPE 2 DIABETES MELLITUS WITHOUT COMPLICATION, WITH LONG-TERM CURRENT USE OF INSULIN (HCC): Primary | ICD-10-CM

## 2021-12-16 DIAGNOSIS — E11.9 TYPE 2 DIABETES MELLITUS WITHOUT COMPLICATION, WITH LONG-TERM CURRENT USE OF INSULIN (HCC): Primary | ICD-10-CM

## 2021-12-16 RX ORDER — FLASH GLUCOSE SCANNING READER
1 EACH MISCELLANEOUS TAKE AS DIRECTED
Qty: 1 EACH | Refills: 0 | Status: SHIPPED | OUTPATIENT
Start: 2021-12-16

## 2021-12-16 RX ORDER — BLOOD SUGAR DIAGNOSTIC
STRIP MISCELLANEOUS
Qty: 100 EACH | Refills: 3 | Status: SHIPPED | OUTPATIENT
Start: 2021-12-16 | End: 2021-12-16 | Stop reason: SDUPTHER

## 2021-12-16 RX ORDER — FLASH GLUCOSE SENSOR
1 KIT MISCELLANEOUS
Qty: 2 EACH | Refills: 11 | Status: SHIPPED | OUTPATIENT
Start: 2021-12-16 | End: 2021-12-16 | Stop reason: SDUPTHER

## 2021-12-16 RX ORDER — FLASH GLUCOSE SENSOR
1 KIT MISCELLANEOUS
Qty: 2 EACH | Refills: 11 | Status: SHIPPED | OUTPATIENT
Start: 2021-12-16 | End: 2022-12-21

## 2021-12-16 RX ORDER — LANCING DEVICE/LANCETS
KIT MISCELLANEOUS
Qty: 1 KIT | Refills: 3 | Status: SHIPPED | OUTPATIENT
Start: 2021-12-16

## 2021-12-16 RX ORDER — FLASH GLUCOSE SCANNING READER
1 EACH MISCELLANEOUS TAKE AS DIRECTED
Qty: 1 EACH | Refills: 0 | Status: SHIPPED | OUTPATIENT
Start: 2021-12-16 | End: 2021-12-16 | Stop reason: SDUPTHER

## 2021-12-16 RX ORDER — LANCING DEVICE/LANCETS
KIT MISCELLANEOUS
Qty: 1 KIT | Refills: 3 | Status: SHIPPED | OUTPATIENT
Start: 2021-12-16 | End: 2021-12-16 | Stop reason: SDUPTHER

## 2021-12-16 RX ORDER — BLOOD SUGAR DIAGNOSTIC
STRIP MISCELLANEOUS
Qty: 100 EACH | Refills: 3 | Status: SHIPPED | OUTPATIENT
Start: 2021-12-16

## 2021-12-16 NOTE — TELEPHONE ENCOUNTER
From: Rohan Evans  To: Sav Mulligan MD  Sent: 12/16/2021 11:37 AM EST  Subject: Refills and CGM    Good morning Dr. Mulligan,  I need some refills and I have a request.   I need One touch Verio test strips.  I have changed to Accurate-Chek fast clix lancets so I will need a refill for those as well.   While talking with the diabetes nurse she mentioned that during the Covid-19 pandemic the requirements for qualifying for a CGM changed and now since I am on insulin I would like to see if you could prescribe the Saeid Free Style 2 for me.

## 2021-12-17 ENCOUNTER — EDUCATION (OUTPATIENT)
Dept: DIABETES SERVICES | Facility: HOSPITAL | Age: 66
End: 2021-12-17

## 2021-12-22 ENCOUNTER — PRIOR AUTHORIZATION (OUTPATIENT)
Dept: INTERNAL MEDICINE | Facility: CLINIC | Age: 66
End: 2021-12-22

## 2021-12-22 DIAGNOSIS — Z79.4 TYPE 2 DIABETES MELLITUS WITHOUT COMPLICATION, WITH LONG-TERM CURRENT USE OF INSULIN (HCC): ICD-10-CM

## 2021-12-22 DIAGNOSIS — E11.9 TYPE 2 DIABETES MELLITUS WITHOUT COMPLICATION, WITH LONG-TERM CURRENT USE OF INSULIN (HCC): ICD-10-CM

## 2021-12-22 RX ORDER — INSULIN GLARGINE 100 [IU]/ML
16 INJECTION, SOLUTION SUBCUTANEOUS NIGHTLY
Qty: 2 PEN | Refills: 0 | Status: SHIPPED | COMMUNITY
Start: 2021-12-22 | End: 2022-01-10 | Stop reason: SDUPTHER

## 2021-12-22 NOTE — PROGRESS NOTES
See my chart message from patient regarding fasting blood sugar levels.  We will increase Lantus from 12 units to 16 units.  I have requested patient to message me back in about 2 weeks with updated levels.    1. Type 2 diabetes mellitus without complication, with long-term current use of insulin (HCC)  - Insulin Glargine (BASAGLAR KWIKPEN) 100 UNIT/ML injection pen; Inject 16 Units under the skin into the appropriate area as directed Every Night. Stop glimepiride, start insulin next day. Increase in dosing per MD  Dispense: 2 pen; Refill: 0    Sav Mulligan MD

## 2021-12-22 NOTE — TELEPHONE ENCOUNTER
[facility] not do [facility]'s on Freestyle Libres   He verbalized understanding

## 2021-12-23 ENCOUNTER — EDUCATION (OUTPATIENT)
Dept: DIABETES SERVICES | Facility: HOSPITAL | Age: 66
End: 2021-12-23

## 2021-12-23 PROCEDURE — U0004 COV-19 TEST NON-CDC HGH THRU: HCPCS | Performed by: NURSE PRACTITIONER

## 2021-12-23 NOTE — CONSULTS
Diabetes Education    Patient Name:  Rohan Evans  YOB: 1955  MRN: [id number]  Admit Date:  (Not on file)        Follow up phone call w/ pt for diabetes ed. Pt has been on insulin for approximately 2 weeks now. He sent glucose readings via [employer] message to his PCP yesterday and received instructions on adjusting his basaglar dose--he took the increased dose for the first time last night. Fasting glucose numbers are trending down. He attempted to get freestyle saeid--prescription was sent to [facility] and he was told it was not covered. [facility] for the first month's worth of sensors--he and his wife set up and he is using Saeid now for glucose monitoring. Per PCP message he is to use this dose of basaglar for 2 weeks and then will send glucose results again for further instructions. Pt does not have any questions at this time and we will plan to follow up during scheduled f/u with diabetes ed on 1/13. Pt has my contact info and will call with any questions.       Electronically signed by:  Mally Dan RN, [facility]  12/23/21 10:32 EST

## 2021-12-24 ENCOUNTER — TELEPHONE (OUTPATIENT)
Dept: URGENT CARE | Facility: CLINIC | Age: 66
End: 2021-12-24

## 2022-01-10 DIAGNOSIS — Z79.4 TYPE 2 DIABETES MELLITUS WITHOUT COMPLICATION, WITH LONG-TERM CURRENT USE OF INSULIN: ICD-10-CM

## 2022-01-10 DIAGNOSIS — E11.9 TYPE 2 DIABETES MELLITUS WITHOUT COMPLICATION, WITH LONG-TERM CURRENT USE OF INSULIN: ICD-10-CM

## 2022-01-10 RX ORDER — INSULIN GLARGINE 100 [IU]/ML
18 INJECTION, SOLUTION SUBCUTANEOUS NIGHTLY
Qty: 2 PEN | Refills: 0 | COMMUNITY
Start: 2022-01-10 | End: 2022-02-01 | Stop reason: SDUPTHER

## 2022-01-13 ENCOUNTER — APPOINTMENT (OUTPATIENT)
Dept: DIABETES SERVICES | Facility: HOSPITAL | Age: 67
End: 2022-01-13

## 2022-02-01 ENCOUNTER — OFFICE VISIT (OUTPATIENT)
Dept: INTERNAL MEDICINE | Facility: CLINIC | Age: 67
End: 2022-02-01

## 2022-02-01 VITALS
TEMPERATURE: 98.4 F | DIASTOLIC BLOOD PRESSURE: 88 MMHG | HEART RATE: 64 BPM | BODY MASS INDEX: 30.07 KG/M2 | WEIGHT: 222 LBS | SYSTOLIC BLOOD PRESSURE: 138 MMHG | HEIGHT: 72 IN

## 2022-02-01 DIAGNOSIS — Z79.4 TYPE 2 DIABETES MELLITUS WITHOUT COMPLICATION, WITH LONG-TERM CURRENT USE OF INSULIN: Primary | ICD-10-CM

## 2022-02-01 DIAGNOSIS — E66.9 OBESITY (BMI 30.0-34.9): ICD-10-CM

## 2022-02-01 DIAGNOSIS — I10 ESSENTIAL HYPERTENSION: ICD-10-CM

## 2022-02-01 DIAGNOSIS — E11.9 TYPE 2 DIABETES MELLITUS WITHOUT COMPLICATION, WITH LONG-TERM CURRENT USE OF INSULIN: Primary | ICD-10-CM

## 2022-02-01 LAB
EXPIRATION DATE: NORMAL
HBA1C MFR BLD: 7.9 %
Lab: NORMAL

## 2022-02-01 PROCEDURE — 99214 OFFICE O/P EST MOD 30 MIN: CPT | Performed by: STUDENT IN AN ORGANIZED HEALTH CARE EDUCATION/TRAINING PROGRAM

## 2022-02-01 PROCEDURE — 83036 HEMOGLOBIN GLYCOSYLATED A1C: CPT | Performed by: STUDENT IN AN ORGANIZED HEALTH CARE EDUCATION/TRAINING PROGRAM

## 2022-02-01 RX ORDER — INSULIN GLARGINE 100 [IU]/ML
22 INJECTION, SOLUTION SUBCUTANEOUS NIGHTLY
Qty: 3 PEN | Refills: 1 | Status: SHIPPED | OUTPATIENT
Start: 2022-02-01 | End: 2022-02-10

## 2022-02-01 NOTE — PROGRESS NOTES
"Chief Complaint  Rohan Evans is a 66 y.o. male presenting for Hypertension, Hyperlipidemia, and Diabetes.     Patient has a past medical history of T2DM without complications, hypertension, hyperlipidemia, GERD, systolic murmur and allergic rhinitis during spring/summer    History of Present Illness  Patient is here for follow-up.    On last visit in December we transitioned him from glipizide to insulin.  He has continued on Metformin.  He has received teaching by diabetic educator regarding insulin use, and he currently has freestyle maria fernanda continuous blood sugar monitoring.  He has adapted well to the insulin, feels comfortable using it.  No episodes of low blood sugar.  Over the last week his blood sugars in the morning fasting are ranging from 166-195.  He has seen highs in the 200s, but most of his levels are in the 100s.    Patient has enrolled in a weight loss program with his wife.  He will focus on weight loss.  He still has follow-up with diabetes educator.    Patient checks his Home Bps typically in the range 120s/70s-80s.    The following portions of the patient's history were reviewed and updated as appropriate: allergies, current medications, past family history, past medical history, past social history, past surgical history and problem list.    Objective  /88 (BP Location: Right arm, Patient Position: Sitting, Cuff Size: Adult)   Pulse 64   Temp 98.4 °F (36.9 °C)   Ht 182.9 cm (72.01\")   Wt 101 kg (222 lb)   BMI 30.10 kg/m²     Physical Exam  Constitutional:       Appearance: Normal appearance. He is obese.   HENT:      Head: Normocephalic.   Eyes:      Extraocular Movements: Extraocular movements intact.      Conjunctiva/sclera: Conjunctivae normal.   Skin:     General: Skin is warm and dry.   Neurological:      Mental Status: He is alert and oriented to person, place, and time. Mental status is at baseline.      Gait: Gait normal.   Psychiatric:         Behavior: Behavior normal.    "      Thought Content: Thought content normal.         Assessment/Plan   1. Type 2 diabetes mellitus without complication, with long-term current use of insulin (HCC)  Hemoglobin A1C   Date Value Ref Range Status   02/01/2022 7.9 % Final   12/07/2021 8.1 % Final   09/07/2021 7.8 % Final   02/04/2021 7.10 (H) 4.80 - 5.60 % Final   Glycemic control improving after transitioning to insulin.  He is currently on Basaglar 18 units, we will increase his dose to 22 units.  After 1 week if the blood sugars are not around 100-120 in the morning fasting he can increase the dose with 2 units, and then after a week increase additionally by 2 units.  He will send me a [employer] message with this so we can titrate before next visit.  - POC Glycosylated Hemoglobin (Hb A1C)  - Insulin Glargine (BASAGLAR KWIKPEN) 100 UNIT/ML injection pen; Inject 22 Units under the skin into the appropriate area as directed Every Night.  Dispense: 3 pen; Refill: 1    2. Essential hypertension  BP Readings from Last 3 Encounters:   02/01/22 138/88   12/07/21 138/84   09/07/21 130/76   Blood pressure is borderline in the office, but he did not take his morning medications.  Home blood pressures are reassuring.  Continue current regimen.    3. Obesity (BMI 30.0-34.9)  Patient's Body mass index is 30.1 kg/m². indicating that he is obese (BMI >30). Obesity-related health conditions include the following: hypertension, diabetes mellitus, dyslipidemias and GERD. Obesity is unchanged. BMI is is above average; BMI management plan is completed. We discussed Weight Watchers or other Commercial based weight reduction program..    Return in about 3 months (around 5/1/2022) for Medicare Wellness.    Future Appointments       Provider Department Center    2/8/2022 1:00 PM [facility] Psychiatric DIABETES ED [place]    5/2/2022 8:00 AM Sav Mulligan MD [facility] INTERNAL MEDICINE [facility]          Sav Mulligan MD  [facility]  Medicine  02/01/2022

## 2022-02-04 DIAGNOSIS — J30.1 SEASONAL ALLERGIC RHINITIS DUE TO POLLEN: ICD-10-CM

## 2022-02-04 RX ORDER — MONTELUKAST SODIUM 10 MG/1
TABLET ORAL
Qty: 90 TABLET | Refills: 1 | Status: SHIPPED | OUTPATIENT
Start: 2022-02-04 | End: 2022-07-22

## 2022-02-07 ENCOUNTER — PATIENT MESSAGE (OUTPATIENT)
Dept: INTERNAL MEDICINE | Facility: CLINIC | Age: 67
End: 2022-02-07

## 2022-02-07 DIAGNOSIS — Z79.4 TYPE 2 DIABETES MELLITUS WITHOUT COMPLICATION, WITH LONG-TERM CURRENT USE OF INSULIN: Primary | ICD-10-CM

## 2022-02-07 DIAGNOSIS — E11.9 TYPE 2 DIABETES MELLITUS WITHOUT COMPLICATION, WITH LONG-TERM CURRENT USE OF INSULIN: Primary | ICD-10-CM

## 2022-02-07 NOTE — TELEPHONE ENCOUNTER
From: Rohan Evans  To: Sav Mulligan MD  Sent: 2/7/2022 1:58 PM EST  Subject: Changing from 6mm to 4mm pen needle    Good afternoon Dr. Mulligan,  I would like change from the 6mm pen needle to a 4mm pen needle this time. Could you call that into [facility] for me? The needles are called: BD Mitzy 4mm 32G.   Thanks   Rohan Evans

## 2022-02-08 ENCOUNTER — HOSPITAL ENCOUNTER (OUTPATIENT)
Dept: DIABETES SERVICES | Facility: HOSPITAL | Age: 67
Setting detail: RECURRING SERIES
Discharge: HOME | End: 2022-02-08

## 2022-02-08 ENCOUNTER — DOCUMENTATION (OUTPATIENT)
Dept: DIABETES SERVICES | Facility: HOSPITAL | Age: 67
End: 2022-02-08

## 2022-02-10 ENCOUNTER — PATIENT MESSAGE (OUTPATIENT)
Dept: INTERNAL MEDICINE | Facility: CLINIC | Age: 67
End: 2022-02-10

## 2022-02-10 DIAGNOSIS — E11.9 TYPE 2 DIABETES MELLITUS WITHOUT COMPLICATION, WITH LONG-TERM CURRENT USE OF INSULIN: ICD-10-CM

## 2022-02-10 DIAGNOSIS — Z79.4 TYPE 2 DIABETES MELLITUS WITHOUT COMPLICATION, WITH LONG-TERM CURRENT USE OF INSULIN: ICD-10-CM

## 2022-02-10 RX ORDER — INSULIN GLARGINE 100 [IU]/ML
25 INJECTION, SOLUTION SUBCUTANEOUS NIGHTLY
Qty: 3 PEN | Refills: 1 | Status: SHIPPED | COMMUNITY
Start: 2022-02-10 | End: 2022-05-02 | Stop reason: SDUPTHER

## 2022-03-08 ENCOUNTER — APPOINTMENT (OUTPATIENT)
Dept: DIABETES SERVICES | Facility: HOSPITAL | Age: 67
End: 2022-03-08

## 2022-05-02 ENCOUNTER — OFFICE VISIT (OUTPATIENT)
Dept: INTERNAL MEDICINE | Facility: CLINIC | Age: 67
End: 2022-05-02

## 2022-05-02 ENCOUNTER — LAB (OUTPATIENT)
Dept: LAB | Facility: HOSPITAL | Age: 67
End: 2022-05-02

## 2022-05-02 VITALS
TEMPERATURE: 98.7 F | HEART RATE: 56 BPM | WEIGHT: 207.8 LBS | HEIGHT: 72 IN | SYSTOLIC BLOOD PRESSURE: 134 MMHG | DIASTOLIC BLOOD PRESSURE: 84 MMHG | BODY MASS INDEX: 28.15 KG/M2

## 2022-05-02 DIAGNOSIS — E66.3 OVERWEIGHT (BMI 25.0-29.9): ICD-10-CM

## 2022-05-02 DIAGNOSIS — N52.2 DRUG-INDUCED ERECTILE DYSFUNCTION: ICD-10-CM

## 2022-05-02 DIAGNOSIS — Z12.11 SCREEN FOR COLON CANCER: ICD-10-CM

## 2022-05-02 DIAGNOSIS — Z79.4 TYPE 2 DIABETES MELLITUS WITHOUT COMPLICATION, WITH LONG-TERM CURRENT USE OF INSULIN: Chronic | ICD-10-CM

## 2022-05-02 DIAGNOSIS — E78.2 MIXED HYPERLIPIDEMIA: Chronic | ICD-10-CM

## 2022-05-02 DIAGNOSIS — E11.9 TYPE 2 DIABETES MELLITUS WITHOUT COMPLICATION, WITH LONG-TERM CURRENT USE OF INSULIN: Chronic | ICD-10-CM

## 2022-05-02 DIAGNOSIS — R74.8 ELEVATED LIVER ENZYMES: ICD-10-CM

## 2022-05-02 DIAGNOSIS — K63.5 POLYP OF COLON, UNSPECIFIED PART OF COLON, UNSPECIFIED TYPE: ICD-10-CM

## 2022-05-02 DIAGNOSIS — I10 ESSENTIAL HYPERTENSION: Chronic | ICD-10-CM

## 2022-05-02 DIAGNOSIS — Z00.00 ENCOUNTER FOR SUBSEQUENT ANNUAL WELLNESS VISIT IN MEDICARE PATIENT: Primary | ICD-10-CM

## 2022-05-02 LAB
ALBUMIN SERPL-MCNC: 4.5 G/DL (ref 3.5–5.2)
ALBUMIN UR-MCNC: <1.2 MG/DL
ALBUMIN/GLOB SERPL: 1.9 G/DL
ALP SERPL-CCNC: 30 U/L (ref 39–117)
ALT SERPL W P-5'-P-CCNC: 18 U/L (ref 1–41)
ANION GAP SERPL CALCULATED.3IONS-SCNC: 12.2 MMOL/L (ref 5–15)
AST SERPL-CCNC: 20 U/L (ref 1–40)
BILIRUB SERPL-MCNC: 0.4 MG/DL (ref 0–1.2)
BUN SERPL-MCNC: 16 MG/DL (ref 8–23)
BUN/CREAT SERPL: 17.6 (ref 7–25)
CALCIUM SPEC-SCNC: 9.8 MG/DL (ref 8.6–10.5)
CHLORIDE SERPL-SCNC: 105 MMOL/L (ref 98–107)
CHOLEST SERPL-MCNC: 195 MG/DL (ref 0–200)
CO2 SERPL-SCNC: 22.8 MMOL/L (ref 22–29)
CREAT SERPL-MCNC: 0.91 MG/DL (ref 0.76–1.27)
EGFRCR SERPLBLD CKD-EPI 2021: 91.8 ML/MIN/1.73
EXPIRATION DATE: NORMAL
GLOBULIN UR ELPH-MCNC: 2.4 GM/DL
GLUCOSE SERPL-MCNC: 128 MG/DL (ref 65–99)
HBA1C MFR BLD: 6.1 %
HDLC SERPL-MCNC: 59 MG/DL (ref 40–60)
LDLC SERPL CALC-MCNC: 121 MG/DL (ref 0–100)
LDLC/HDLC SERPL: 2.03 {RATIO}
Lab: NORMAL
POTASSIUM SERPL-SCNC: 4.2 MMOL/L (ref 3.5–5.2)
PROT SERPL-MCNC: 6.9 G/DL (ref 6–8.5)
SODIUM SERPL-SCNC: 140 MMOL/L (ref 136–145)
TRIGL SERPL-MCNC: 80 MG/DL (ref 0–150)
VLDLC SERPL-MCNC: 15 MG/DL (ref 5–40)

## 2022-05-02 PROCEDURE — 82043 UR ALBUMIN QUANTITATIVE: CPT

## 2022-05-02 PROCEDURE — 1159F MED LIST DOCD IN RCRD: CPT | Performed by: STUDENT IN AN ORGANIZED HEALTH CARE EDUCATION/TRAINING PROGRAM

## 2022-05-02 PROCEDURE — 1170F FXNL STATUS ASSESSED: CPT | Performed by: STUDENT IN AN ORGANIZED HEALTH CARE EDUCATION/TRAINING PROGRAM

## 2022-05-02 PROCEDURE — 83036 HEMOGLOBIN GLYCOSYLATED A1C: CPT | Performed by: STUDENT IN AN ORGANIZED HEALTH CARE EDUCATION/TRAINING PROGRAM

## 2022-05-02 PROCEDURE — 80061 LIPID PANEL: CPT

## 2022-05-02 PROCEDURE — 80053 COMPREHEN METABOLIC PANEL: CPT

## 2022-05-02 PROCEDURE — 3044F HG A1C LEVEL LT 7.0%: CPT | Performed by: STUDENT IN AN ORGANIZED HEALTH CARE EDUCATION/TRAINING PROGRAM

## 2022-05-02 PROCEDURE — G0439 PPPS, SUBSEQ VISIT: HCPCS | Performed by: STUDENT IN AN ORGANIZED HEALTH CARE EDUCATION/TRAINING PROGRAM

## 2022-05-02 RX ORDER — INSULIN GLARGINE 100 [IU]/ML
25 INJECTION, SOLUTION SUBCUTANEOUS NIGHTLY
Qty: 8 PEN | Refills: 1 | Status: SHIPPED | OUTPATIENT
Start: 2022-05-02 | End: 2022-08-02

## 2022-05-02 NOTE — PROGRESS NOTES
QUICK REFERENCE INFORMATION:  The ABCs of the Annual Wellness Visit    Subsequent Medicare Wellness Visit    Patient has a past medical history of T2DM without complications, hypertension, hyperlipidemia, GERD, systolic murmur and allergic rhinitis during spring/summer.    Patient is here for annual Medicare visit.  He is overall doing well.    His blood sugar fasting AM typically ranges around . Lowest on the Freestyle Saeid around 80. Highest 158.    Patient did do Nutrisystem for about 2 months.  He also has reduced meal sizes and he does Nutrisystem intermittently.  He has lost a lot of weight with this method and is now stable around 200 pounds.  He also goes for walks every day about 20 minutes in the evening, he also walks a lot during the days.    Patient did try statins in the past, he was on Lipitor, but he got elevation muscle enzyme and had to transition to Zetia.  He was noted with mildly elevated liver enzymes.    Patient also had a poor bowel prep on his colonoscopy in 2021, and he is due for repeat for Dr. Mcdaniel's recommendations.  They also recommended a 48-hour bowel prep.      HEALTH RISK ASSESSMENT    1955    Recent Hospitalizations:  No hospitalization(s) within the last year..        Current Medical Providers:  Patient Care Team:  Sav Mulligan MD as PCP - General (Family Medicine)        Smoking Status:  Social History     Tobacco Use   Smoking Status Former Smoker   • Packs/day: 0.50   • Years: 20.00   • Pack years: 10.00   • Types: Cigarettes   • Quit date:    • Years since quittin.3   Smokeless Tobacco Never Used       Alcohol Consumption:  Social History     Substance and Sexual Activity   Alcohol Use Yes    Comment: 1 drink occsionally       Depression Screen:   PHQ-2/PHQ-9 Depression Screening 2022   Retired Total Score -   Little Interest or Pleasure in Doing Things 0-->not at all   Feeling Down, Depressed or Hopeless 0-->not at all   PHQ-9: Brief  Depression Severity Measure Score 0       Health Habits and Functional and Cognitive Screening:  Functional & Cognitive Status 5/2/2022   Do you have difficulty preparing food and eating? No   Do you have difficulty bathing yourself, getting dressed or grooming yourself? No   Do you have difficulty using the toilet? No   Do you have difficulty moving around from place to place? No   Do you have trouble with steps or getting out of a bed or a chair? No   Current Diet Well Balanced Diet   Dental Exam Up to date   Eye Exam Up to date   Exercise (times per week) 3 times per week   Current Exercises Include Yard Work   Current Exercise Activities Include -   Do you need help using the phone?  No   Are you deaf or do you have serious difficulty hearing?  No   Do you need help with transportation? No   Do you need help shopping? No   Do you need help preparing meals?  No   Do you need help with housework?  No   Do you need help with laundry? No   Do you need help taking your medications? No   Do you need help managing money? No   Do you ever drive or ride in a car without wearing a seat belt? No   Have you felt unusual stress, anger or loneliness in the last month? No   Who do you live with? Spouse   If you need help, do you have trouble finding someone available to you? No   Have you been bothered in the last four weeks by sexual problems? No   Do you have difficulty concentrating, remembering or making decisions? No       Fall Risk Screen:  KIMBERLYADI Fall Risk Assessment was completed, and patient is at LOW risk for falls.Assessment completed on:5/2/2022    ACE III MINI        Does the patient have evidence of cognitive impairment? No    Aspirin use counseling: Does not need ASA but is currently taking (advised patient that ASA is not indicated and patient chooses to stop it)    Recent Lab Results:  CMP:  Lab Results   Component Value Date    BUN 21 09/07/2021    CREATININE 0.95 09/07/2021    EGFRIFNONA 79 09/07/2021    BCR  22.1 09/07/2021     09/07/2021    K 4.8 09/07/2021    CO2 24.0 09/07/2021    CALCIUM 9.5 09/07/2021    ALBUMIN 4.60 09/07/2021    BILITOT 0.4 09/07/2021    ALKPHOS 34 (L) 09/07/2021    AST 42 (H) 09/07/2021    ALT 55 (H) 09/07/2021     HbA1c:  Lab Results   Component Value Date    HGBA1C 6.1 05/02/2022    HGBA1C 7.9 02/01/2022     Microalbumin:  Lab Results   Component Value Date    MICROALBUR 2.1 02/04/2021     Lipid Panel  Lab Results   Component Value Date    CHOL 193 02/04/2021    TRIG 105 02/04/2021    HDL 59 02/04/2021     (H) 02/04/2021    AST 42 (H) 09/07/2021    ALT 55 (H) 09/07/2021       Visual Acuity:  No exam data present    Age-appropriate Screening Schedule:  Refer to the list below for future screening recommendations based on patient's age, sex and/or medical conditions. Orders for these recommended tests are listed in the plan section. The patient has been provided with a written plan.    Health Maintenance   Topic Date Due   • ZOSTER VACCINE (1 of 2) Never done   • TDAP/TD VACCINES (2 - Td or Tdap) 02/25/2021   • LIPID PANEL  02/04/2022   • URINE MICROALBUMIN  02/04/2022   • INFLUENZA VACCINE  08/01/2022   • DIABETIC EYE EXAM  08/09/2022   • HEMOGLOBIN A1C  11/02/2022   • DIABETIC FOOT EXAM  05/02/2023        Subjective   History of Present Illness    Rohan Evans is a 66 y.o. male who presents for a Subsequent Wellness Visit.    CHRONIC CONDITIONS    The following portions of the patient's history were reviewed and updated as appropriate: allergies, current medications, past family history, past medical history, past social history, past surgical history and problem list.    Outpatient Medications Prior to Visit   Medication Sig Dispense Refill   • amLODIPine (NORVASC) 5 MG tablet Take 1 tablet by mouth Daily. 90 tablet 1   • aspirin 81 MG EC tablet Take 1 tablet by mouth Daily. 1 tablet 0   • Continuous Blood Gluc  (FreeStyle Saeid 14 Day [place]) device 1 Device Take As  Directed. 1 each 0   • Continuous Blood Gluc Sensor (FreeStyle Saeid 14 Day Sensor) misc Inject 1 Device under the skin into the appropriate area as directed Every 14 (Fourteen) Days. 2 each 11   • ezetimibe (ZETIA) 10 MG tablet Take 10 mg by mouth Daily.     • Insulin Pen Needle 32G X 4 MM misc 1 Units Daily. 100 each 11   • Lancets Misc. (Accu-Chek FastClix Lancet) kit Use to check blood sugar up to three times a day 1 kit 3   • lisinopril (PRINIVIL,ZESTRIL) 20 MG tablet Take 1 tablet by mouth Daily. 90 tablet 3   • metFORMIN (GLUCOPHAGE) 1000 MG tablet Take 1,000 mg by mouth 2 (Two) Times a Day With Meals. Takes 2  500 mg tablets BID     • montelukast (SINGULAIR) 10 MG tablet TAKE 1 TABLET DAILY 90 tablet 1   • omeprazole (priLOSEC) 20 MG capsule Take 1 capsule by mouth Daily. 90 capsule 1   • OneTouch Verio test strip Use to check blood sugar up to three times a day 100 each 3   • Insulin Glargine (BASAGLAR KWIKPEN) 100 UNIT/ML injection pen Inject 25 Units under the skin into the appropriate area as directed Every Night. 3 pen 1     No facility-administered medications prior to visit.       Patient Active Problem List   Diagnosis   • Type 2 diabetes mellitus without complication, with long-term current use of insulin (HCC)   • Essential hypertension   • Gastroesophageal reflux disease without esophagitis   • Mixed hyperlipidemia   • Seasonal allergic rhinitis due to pollen   • History of cardiac murmur   • Overweight (BMI 25.0-29.9)   • Medicare annual wellness visit, subsequent   • Polyp of colon   • Drug-induced erectile dysfunction   • Elevated liver enzymes       Advance Care Planning:  ACP discussion was held with the patient during this visit. Patient does not have an advance directive, information provided.    Identification of Risk Factors:  Risk factors include: Advance Directive Discussion.    Review of Systems    Compared to one year ago, the patient feels his physical health is better.  Compared to  one year ago, the patient feels his mental health is better.    Objective     Physical Exam  Vitals reviewed.   Constitutional:       Appearance: Normal appearance.   HENT:      Head: Normocephalic and atraumatic.      Nose: No congestion.   Eyes:      Extraocular Movements: Extraocular movements intact.      Conjunctiva/sclera: Conjunctivae normal.   Cardiovascular:      Rate and Rhythm: Normal rate and regular rhythm.      Pulses:           Dorsalis pedis pulses are 1+ on the right side and 1+ on the left side.        Posterior tibial pulses are 2+ on the right side and 1+ on the left side.      Heart sounds: Murmur heard.   Pulmonary:      Effort: Pulmonary effort is normal.      Breath sounds: Normal breath sounds.   Abdominal:      General: There is no distension.      Palpations: Abdomen is soft. There is no mass.      Tenderness: There is no abdominal tenderness.   Musculoskeletal:      Cervical back: Neck supple.      Right lower leg: No edema.      Left lower leg: No edema.      Right foot: Normal range of motion.      Left foot: Normal range of motion.   Feet:      Right foot:      Protective Sensation: 10 sites tested. 10 sites sensed.      Skin integrity: Skin integrity normal. No ulcer or skin breakdown.      Toenail Condition: Right toenails are normal.      Left foot:      Protective Sensation: 10 sites tested. 10 sites sensed.      Skin integrity: Skin integrity normal. No ulcer or skin breakdown.      Toenail Condition: Left toenails are normal.      Comments: Diabetic Foot Exam Performed and Monofilament Test Performed  Normal  Skin:     General: Skin is warm and dry.   Neurological:      Mental Status: He is alert and oriented to person, place, and time. Mental status is at baseline.   Psychiatric:         Behavior: Behavior normal.         Thought Content: Thought content normal.          Procedures     Vitals:    05/02/22 0806   BP: 134/84   BP Location: Left arm   Patient Position: Sitting  "  Cuff Size: Adult   Pulse: 56   Temp: 98.7 °F (37.1 °C)   TempSrc: Temporal   Weight: 94.3 kg (207 lb 12.8 oz)   Height: 182.9 cm (72\")   PainSc: 0-No pain       BMI is >= 25 and < 30. (Overweight) The following options were offered after discussion: exercise counseling/recommendations and nutrition counseling/recommendations      Assessment/Plan     1. Encounter for subsequent annual wellness visit in Medicare patient  Patient is due for second pneumococcus vaccine after 12/7/2022    2. Type 2 diabetes mellitus without complication, with long-term current use of insulin ([facility])  Hemoglobin A1C   Date Value Ref Range Status   05/02/2022 6.1 % Final   02/01/2022 7.9 % Final   12/07/2021 8.1 % Final   02/04/2021 7.10 (H) 4.80 - 5.60 % Final   Significant improvement of A1c.  No hypoglycemic episodes.  He is currently on insulin glargine 25 units daily, but we might have to reduce the dose if he starts to drop this levels.  He will continue on freestyle maria fernanda and is comfortable using it.  If his levels would drop below 70-80 and a low baseline then he is currently at he can come back to 20-22 units of insulin, but he can also reach out to me for advice at any point.  - POC Glycosylated Hemoglobin (Hb A1C)  - MicroAlbumin, Urine, Random - Urine, Clean Catch; Future  - Insulin Glargine (BASAGLAR KWIKPEN) 100 UNIT/ML injection pen; Inject 25 Units under the skin into the appropriate area as directed Every Night.  Dispense: 8 pen; Refill: 1    3. Essential hypertension  BP Readings from Last 3 Encounters:   05/02/22 134/84   02/01/22 138/88   12/07/21 138/84   Blood pressure currently at goal.  We will continue on current medications.    4. Mixed hyperlipidemia  5. Elevated liver enzymes  Elevated liver enzymes possibly related to Zetia.  We will recheck levels now.  For now continue on current medication.  - Lipid Panel; Future  - Comprehensive Metabolic Panel; Future    6. Drug-induced erectile dysfunction  Patient states " since he went off glimepiride and since his lifestyle changes to his erectile dysfunction has resolved.    7. Polyp of colon, unspecified part of colon, unspecified type  8. Screen for colon cancer  Referred for repeat colonoscopy due to poor bowel prep.  - Amb referral for Screening Colonoscopy    9. Overweight (BMI 25.0-29.9)    Patient Self-Management and Personalized Health Advice  The patient has been provided with information about: exercise and weight management and preventive services including:   · Annual Wellness Visit (AWV)  · Prostate Cancer Screening .    Outpatient Encounter Medications as of 5/2/2022   Medication Sig Dispense Refill   • amLODIPine (NORVASC) 5 MG tablet Take 1 tablet by mouth Daily. 90 tablet 1   • aspirin 81 MG EC tablet Take 1 tablet by mouth Daily. 1 tablet 0   • Continuous Blood Gluc  (FreeStyle Saeid 14 Day [place]) device 1 Device Take As Directed. 1 each 0   • Continuous Blood Gluc Sensor (FreeStyle Saeid 14 Day Sensor) misc Inject 1 Device under the skin into the appropriate area as directed Every 14 (Fourteen) Days. 2 each 11   • ezetimibe (ZETIA) 10 MG tablet Take 10 mg by mouth Daily.     • Insulin Glargine (BASAGLAR KWIKPEN) 100 UNIT/ML injection pen Inject 25 Units under the skin into the appropriate area as directed Every Night. 8 pen 1   • Insulin Pen Needle 32G X 4 MM misc 1 Units Daily. 100 each 11   • Lancets Misc. (Accu-Chek FastClix Lancet) kit Use to check blood sugar up to three times a day 1 kit 3   • lisinopril (PRINIVIL,ZESTRIL) 20 MG tablet Take 1 tablet by mouth Daily. 90 tablet 3   • metFORMIN (GLUCOPHAGE) 1000 MG tablet Take 1,000 mg by mouth 2 (Two) Times a Day With Meals. Takes 2  500 mg tablets BID     • montelukast (SINGULAIR) 10 MG tablet TAKE 1 TABLET DAILY 90 tablet 1   • omeprazole (priLOSEC) 20 MG capsule Take 1 capsule by mouth Daily. 90 capsule 1   • OneTouch Verio test strip Use to check blood sugar up to three times a day 100 each 3   •  [DISCONTINUED] Insulin Glargine (BASAGLAR KWIKPEN) 100 UNIT/ML injection pen Inject 25 Units under the skin into the appropriate area as directed Every Night. 3 pen 1     No facility-administered encounter medications on file as of 5/2/2022.       Reviewed use of high risk medication in the elderly: yes  Reviewed for potential of harmful drug interactions in the elderly: yes    Follow Up:  Return in about 3 months (around 8/2/2022) for Recheck.     Future Appointments       Provider Department Center    8/2/2022 9:00 AM Sav Mulligan MD [facility] INTERNAL MEDICINE [place]            There are no Patient Instructions on file for this visit.    An After Visit Summary and PPPS with all of these plans were given to the patient.        Sav Mulligan MD

## 2022-05-05 ENCOUNTER — PATIENT MESSAGE (OUTPATIENT)
Dept: INTERNAL MEDICINE | Facility: CLINIC | Age: 67
End: 2022-05-05

## 2022-05-05 RX ORDER — EZETIMIBE 10 MG/1
10 TABLET ORAL DAILY
Qty: 1 TABLET | Refills: 0 | Status: SHIPPED | OUTPATIENT
Start: 2022-05-05 | End: 2022-05-05 | Stop reason: SDUPTHER

## 2022-05-05 RX ORDER — EZETIMIBE 10 MG/1
10 TABLET ORAL DAILY
Qty: 90 TABLET | Refills: 1 | Status: SHIPPED | OUTPATIENT
Start: 2022-05-05 | End: 2022-05-05

## 2022-05-05 RX ORDER — EZETIMIBE 10 MG/1
10 TABLET ORAL DAILY
Qty: 30 TABLET | Refills: 0 | Status: SHIPPED | OUTPATIENT
Start: 2022-05-05 | End: 2022-05-27

## 2022-05-05 NOTE — TELEPHONE ENCOUNTER
From: Rohan Evans  To: Sav Mulligan MD  Sent: 5/5/2022 11:18 AM EDT  Subject: Cholesterol medicine Ezetimibe     Good morning Dr. Mulligan,  I am totally out of my cholesterol medicine, and we are going out of town tomorrow. Can you please call a refill into [facility] for me? This is one of the meds I mentioned was running out of that needed a Doctors visit for refill.   Thank you  Rohan Evans

## 2022-05-12 DIAGNOSIS — K21.9 GASTROESOPHAGEAL REFLUX DISEASE WITHOUT ESOPHAGITIS: ICD-10-CM

## 2022-05-12 RX ORDER — OMEPRAZOLE 20 MG/1
CAPSULE, DELAYED RELEASE ORAL
Qty: 90 CAPSULE | Refills: 1 | Status: SHIPPED | OUTPATIENT
Start: 2022-05-12 | End: 2022-10-31

## 2022-05-16 DIAGNOSIS — Z12.11 ENCOUNTER FOR SCREENING COLONOSCOPY: Primary | ICD-10-CM

## 2022-05-27 ENCOUNTER — PRIOR AUTHORIZATION (OUTPATIENT)
Dept: GASTROENTEROLOGY | Facility: CLINIC | Age: 67
End: 2022-05-27

## 2022-05-27 DIAGNOSIS — Z12.11 ENCOUNTER FOR SCREENING COLONOSCOPY: ICD-10-CM

## 2022-05-27 RX ORDER — EZETIMIBE 10 MG/1
TABLET ORAL
Qty: 30 TABLET | Refills: 5 | Status: SHIPPED | OUTPATIENT
Start: 2022-05-27 | End: 2022-11-08

## 2022-06-08 ENCOUNTER — OUTSIDE FACILITY SERVICE (OUTPATIENT)
Dept: GASTROENTEROLOGY | Facility: CLINIC | Age: 67
End: 2022-06-08

## 2022-06-08 DIAGNOSIS — E11.9 TYPE 2 DIABETES MELLITUS WITHOUT COMPLICATION, WITHOUT LONG-TERM CURRENT USE OF INSULIN: ICD-10-CM

## 2022-06-08 DIAGNOSIS — I10 ESSENTIAL HYPERTENSION: ICD-10-CM

## 2022-06-08 PROCEDURE — 45380 COLONOSCOPY AND BIOPSY: CPT | Performed by: INTERNAL MEDICINE

## 2022-06-08 PROCEDURE — 45385 COLONOSCOPY W/LESION REMOVAL: CPT | Performed by: INTERNAL MEDICINE

## 2022-06-08 PROCEDURE — 88305 TISSUE EXAM BY PATHOLOGIST: CPT | Performed by: INTERNAL MEDICINE

## 2022-06-09 ENCOUNTER — LAB REQUISITION (OUTPATIENT)
Dept: LAB | Facility: HOSPITAL | Age: 67
End: 2022-06-09

## 2022-06-09 DIAGNOSIS — D12.4 BENIGN NEOPLASM OF DESCENDING COLON: ICD-10-CM

## 2022-06-09 DIAGNOSIS — Z86.010 PERSONAL HISTORY OF COLONIC POLYPS: ICD-10-CM

## 2022-06-09 DIAGNOSIS — D12.3 BENIGN NEOPLASM OF TRANSVERSE COLON: ICD-10-CM

## 2022-06-09 DIAGNOSIS — K57.30 DIVERTICULOSIS OF LARGE INTESTINE WITHOUT PERFORATION OR ABSCESS WITHOUT BLEEDING: ICD-10-CM

## 2022-06-09 DIAGNOSIS — K64.8 OTHER HEMORRHOIDS: ICD-10-CM

## 2022-06-09 DIAGNOSIS — D12.2 BENIGN NEOPLASM OF ASCENDING COLON: ICD-10-CM

## 2022-06-09 DIAGNOSIS — Z12.11 ENCOUNTER FOR SCREENING FOR MALIGNANT NEOPLASM OF COLON: ICD-10-CM

## 2022-06-09 RX ORDER — AMLODIPINE BESYLATE 5 MG/1
TABLET ORAL
Qty: 90 TABLET | Refills: 1 | Status: SHIPPED | OUTPATIENT
Start: 2022-06-09 | End: 2022-11-02

## 2022-06-09 NOTE — TELEPHONE ENCOUNTER
Rx Refill Note  Requested Prescriptions     Pending Prescriptions Disp Refills   • metFORMIN (GLUCOPHAGE) 500 MG tablet [Pharmacy Med Name: METFORMIN TAB 500MG] 360 tablet 3     Sig: TAKE 2 TABLETS TWICE A DAY WITH MEALS   • amLODIPine (NORVASC) 5 MG tablet [Pharmacy Med Name: AMLODIPINE TAB 5MG] 90 tablet 1     Sig: TAKE 1 TABLET DAILY      Last office visit with prescribing clinician: 5/2/2022      Next office visit with prescribing clinician: 8/2/2022            Jenise Oneal LPN  06/09/22, 09:38 EDT

## 2022-06-13 ENCOUNTER — TELEPHONE (OUTPATIENT)
Dept: INTERNAL MEDICINE | Facility: CLINIC | Age: 67
End: 2022-06-13

## 2022-06-13 NOTE — TELEPHONE ENCOUNTER
[facility] mail pharmacy called requesting clarification on the patients prescription of metformin    The prescription is for 1000 MG but the instructions mention taking 2X 500 MG, return phone number is [phone]    Order number is [id number]

## 2022-06-13 NOTE — TELEPHONE ENCOUNTER
Love asked me about this last week.  I corrected the Rx and send it in as 1000 mg to take twice daily.  Did the mail order pharmacy not receive the corrected Rx?

## 2022-06-14 NOTE — TELEPHONE ENCOUNTER
Called [facility] talked to Joy informed her of new Rx that was sent on 6/10/22. Joy looked in patient's chart the Metformin Rx was received and has already shipped out

## 2022-07-22 DIAGNOSIS — J30.1 SEASONAL ALLERGIC RHINITIS DUE TO POLLEN: ICD-10-CM

## 2022-07-22 RX ORDER — MONTELUKAST SODIUM 10 MG/1
TABLET ORAL
Qty: 90 TABLET | Refills: 1 | Status: SHIPPED | OUTPATIENT
Start: 2022-07-22 | End: 2023-01-17

## 2022-08-02 ENCOUNTER — OFFICE VISIT (OUTPATIENT)
Dept: INTERNAL MEDICINE | Facility: CLINIC | Age: 67
End: 2022-08-02

## 2022-08-02 VITALS
WEIGHT: 208.4 LBS | HEIGHT: 72 IN | BODY MASS INDEX: 28.23 KG/M2 | SYSTOLIC BLOOD PRESSURE: 134 MMHG | TEMPERATURE: 98 F | HEART RATE: 60 BPM | DIASTOLIC BLOOD PRESSURE: 74 MMHG

## 2022-08-02 DIAGNOSIS — E11.9 TYPE 2 DIABETES MELLITUS WITHOUT COMPLICATION, WITH LONG-TERM CURRENT USE OF INSULIN: Primary | Chronic | ICD-10-CM

## 2022-08-02 DIAGNOSIS — I10 ESSENTIAL HYPERTENSION: Chronic | ICD-10-CM

## 2022-08-02 DIAGNOSIS — E66.3 OVERWEIGHT (BMI 25.0-29.9): ICD-10-CM

## 2022-08-02 DIAGNOSIS — Z79.4 TYPE 2 DIABETES MELLITUS WITHOUT COMPLICATION, WITH LONG-TERM CURRENT USE OF INSULIN: Primary | Chronic | ICD-10-CM

## 2022-08-02 PROBLEM — E78.2 MIXED HYPERLIPIDEMIA: Chronic | Status: ACTIVE | Noted: 2020-01-01

## 2022-08-02 LAB
EXPIRATION DATE: NORMAL
HBA1C MFR BLD: 6.8 %
Lab: NORMAL

## 2022-08-02 PROCEDURE — 99214 OFFICE O/P EST MOD 30 MIN: CPT | Performed by: STUDENT IN AN ORGANIZED HEALTH CARE EDUCATION/TRAINING PROGRAM

## 2022-08-02 PROCEDURE — 83036 HEMOGLOBIN GLYCOSYLATED A1C: CPT | Performed by: STUDENT IN AN ORGANIZED HEALTH CARE EDUCATION/TRAINING PROGRAM

## 2022-08-02 PROCEDURE — 3044F HG A1C LEVEL LT 7.0%: CPT | Performed by: STUDENT IN AN ORGANIZED HEALTH CARE EDUCATION/TRAINING PROGRAM

## 2022-08-02 RX ORDER — INSULIN GLARGINE 100 [IU]/ML
24 INJECTION, SOLUTION SUBCUTANEOUS NIGHTLY
Qty: 8 PEN | Refills: 1 | Status: SHIPPED | COMMUNITY
Start: 2022-08-02 | End: 2022-10-31

## 2022-08-02 NOTE — PROGRESS NOTES
"Chief Complaint  Rohan Evans is a 67 y.o. male presenting for Diabetes (F/u ).     Patient has a past medical history of T2DM without complications, hypertension, hyperlipidemia, GERD, systolic murmur and allergic rhinitis during spring/summer.    History of Present Illness  Patient is here for follow-up.    Is overall doing well.  He continues to monitor his blood sugar, in the morning it is typically in the 120s fasting.  He did reduce his insulin from 25 units to 22 units.  He has not had any low blood sugars, the lowest was in the 90s.    Patient did not have shingles vaccine in the past.  He also did not have AAA screening.    The following portions of the patient's history were reviewed and updated as appropriate: allergies, current medications, past family history, past medical history, past social history, past surgical history and problem list.    Objective  /74 (BP Location: Left arm, Patient Position: Sitting, Cuff Size: Adult)   Pulse 60   Temp 98 °F (36.7 °C) (Temporal)   Ht 182.9 cm (72.01\")   Wt 94.5 kg (208 lb 6.4 oz)   BMI 28.26 kg/m²     Physical Exam  Constitutional:       Appearance: Normal appearance.   Eyes:      Extraocular Movements: Extraocular movements intact.      Conjunctiva/sclera: Conjunctivae normal.   Neurological:      Mental Status: He is alert and oriented to person, place, and time. Mental status is at baseline.   Psychiatric:         Behavior: Behavior normal.         Thought Content: Thought content normal.         Assessment/Plan   1. Type 2 diabetes mellitus without complication, with long-term current use of insulin ([facility])  Hemoglobin A1C   Date Value Ref Range Status   08/02/2022 6.8 % Final   05/02/2022 6.1 % Final   02/01/2022 7.9 % Final   02/04/2021 7.10 (H) 4.80 - 5.60 % Final   Glycemic control is good and at goal.  We will increase insulin slightly from 22 to 24 units at night.  We will continue to monitor his blood sugars, and I recommend he gets in " touch if he starts to get blood sugars in the 70s or lower.  - POC Glycosylated Hemoglobin (Hb A1C)  - Insulin Glargine (BASAGLAR KWIKPEN) 100 UNIT/ML injection pen; Inject 24 Units under the skin into the appropriate area as directed Every Night.  Dispense: 8 pen; Refill: 1  - Insulin Pen Needle 32G X 4 MM misc; 1 Units Daily.  Dispense: 100 each; Refill: 11    2. Essential hypertension  BP Readings from Last 3 Encounters:   08/02/22 134/74   05/02/22 134/84   02/01/22 138/88   Blood pressure currently at goal.  Continue on current medications.    3. Overweight (BMI 25.0-29.9)  BMI is >= 25 and <30. (Overweight) The following options were offered after discussion;: exercise counseling/recommendations    We have discussed vaccine with Chaitanya, I have recommended he check with the pharmacy if this plan would cover it.  It is recommended to prevent complications of possible future shingles outbreak, which can involve a lot of pain.  I have also discussed AAA screening given this remote history of tobacco smoking.  Patient wants to think about it for now.      Return in about 3 months (around 11/2/2022) for Recheck.    Future Appointments       Provider Department Center    11/2/2022 9:00 AM Sav Mulligan MD [facility] INTER[facility] MEDICINE [place]          Sav Mulligan MD  Family Medicine  08/02/2022  Answers for HPI/ROS submitted by the patient on 7/26/2022  What is the primary reason for your visit?: Other  Please describe your symptoms.: None just a follow up visit  Have you had these symptoms before?: No  How long have you been having these symptoms?: 1-4 days

## 2022-08-18 ENCOUNTER — PATIENT MESSAGE (OUTPATIENT)
Dept: INTERNAL MEDICINE | Facility: CLINIC | Age: 67
End: 2022-08-18

## 2022-08-18 DIAGNOSIS — Z79.4 TYPE 2 DIABETES MELLITUS WITHOUT COMPLICATION, WITH LONG-TERM CURRENT USE OF INSULIN: Primary | ICD-10-CM

## 2022-08-18 DIAGNOSIS — E11.9 TYPE 2 DIABETES MELLITUS WITHOUT COMPLICATION, WITH LONG-TERM CURRENT USE OF INSULIN: Primary | ICD-10-CM

## 2022-08-18 RX ORDER — LANCETS
EACH MISCELLANEOUS
Qty: 100 EACH | Refills: 2 | Status: SHIPPED | OUTPATIENT
Start: 2022-08-18 | End: 2023-01-25 | Stop reason: SDUPTHER

## 2022-08-18 NOTE — TELEPHONE ENCOUNTER
From: Rohan Evans  To: Sav Mulligan MD  Sent: 8/18/2022 10:05 AM EDT  Subject: Accu-chek lancing device    Good morning Dr. Mulligan,  I think on my prescription list it must show the Accu-Chek lancing device. Can you please change that to Accu-Chek fastclix 100+2 lancets. The refill lancets are what I need. Got plenty of the lancing devices. And place that refill with [facility], thanks.  Armin Evans

## 2022-09-20 DIAGNOSIS — I10 ESSENTIAL HYPERTENSION: ICD-10-CM

## 2022-09-20 RX ORDER — LISINOPRIL 20 MG/1
TABLET ORAL
Qty: 90 TABLET | Refills: 3 | Status: SHIPPED | OUTPATIENT
Start: 2022-09-20

## 2022-10-31 DIAGNOSIS — Z79.4 TYPE 2 DIABETES MELLITUS WITHOUT COMPLICATION, WITH LONG-TERM CURRENT USE OF INSULIN: Chronic | ICD-10-CM

## 2022-10-31 DIAGNOSIS — K21.9 GASTROESOPHAGEAL REFLUX DISEASE WITHOUT ESOPHAGITIS: ICD-10-CM

## 2022-10-31 DIAGNOSIS — E11.9 TYPE 2 DIABETES MELLITUS WITHOUT COMPLICATION, WITH LONG-TERM CURRENT USE OF INSULIN: Chronic | ICD-10-CM

## 2022-10-31 RX ORDER — OMEPRAZOLE 20 MG/1
CAPSULE, DELAYED RELEASE ORAL
Qty: 90 CAPSULE | Refills: 1 | Status: SHIPPED | OUTPATIENT
Start: 2022-10-31

## 2022-10-31 RX ORDER — INSULIN GLARGINE 100 [IU]/ML
INJECTION, SOLUTION SUBCUTANEOUS
Qty: 30 ML | Refills: 1 | Status: SHIPPED | OUTPATIENT
Start: 2022-10-31 | End: 2022-12-21

## 2022-10-31 NOTE — TELEPHONE ENCOUNTER
Rx Refill Note  Requested Prescriptions     Pending Prescriptions Disp Refills   • Insulin Glargine (BASAGLAR KWIKPEN) 100 UNIT/ML injection pen [Pharmacy Med Name: BASAGLAR KWK PEN 100U/ML] 30 mL 1     Sig: INJECT 25 UNITS            SUBCUTANEOUSLY INTO THE    APPROPRIATE AREA AS        DIRECTED EVERY NIGHT   • omeprazole (priLOSEC) 20 MG capsule [Pharmacy Med Name: OMEPRAZOL RX CAP 20MG] 90 capsule 1     Sig: TAKE 1 CAPSULE DAILY      Last office visit with prescribing clinician: 8/2/2022      Next office visit with prescribing clinician: Visit date not found            Jenise Oneal LPN  10/31/22, 13:34 EDT

## 2022-11-01 DIAGNOSIS — E11.9 TYPE 2 DIABETES MELLITUS WITHOUT COMPLICATION, WITHOUT LONG-TERM CURRENT USE OF INSULIN: ICD-10-CM

## 2022-11-01 DIAGNOSIS — I10 ESSENTIAL HYPERTENSION: ICD-10-CM

## 2022-11-02 RX ORDER — AMLODIPINE BESYLATE 5 MG/1
TABLET ORAL
Qty: 90 TABLET | Refills: 1 | Status: SHIPPED | OUTPATIENT
Start: 2022-11-02

## 2022-11-02 NOTE — TELEPHONE ENCOUNTER
Rx Refill Note  Requested Prescriptions     Pending Prescriptions Disp Refills   • amLODIPine (NORVASC) 5 MG tablet [Pharmacy Med Name: AMLODIPINE TAB 5MG] 90 tablet 1     Sig: TAKE 1 TABLET DAILY   • metFORMIN (GLUCOPHAGE) 1000 MG tablet [Pharmacy Med Name: METFORMIN TAB 1000MG] 180 tablet 1     Sig: TAKE 1 TABLET TWICE DAILY  WITH MEALS      Last office visit with prescribing clinician: 8/2/2022      Next office visit with prescribing clinician: 12/21/2022            Sandra Pak LPN  11/02/22, 07:53 EDT

## 2022-11-08 RX ORDER — EZETIMIBE 10 MG/1
TABLET ORAL
Qty: 90 TABLET | Refills: 1 | Status: SHIPPED | OUTPATIENT
Start: 2022-11-08

## 2022-12-21 ENCOUNTER — OFFICE VISIT (OUTPATIENT)
Dept: INTERNAL MEDICINE | Facility: CLINIC | Age: 67
End: 2022-12-21

## 2022-12-21 VITALS
DIASTOLIC BLOOD PRESSURE: 80 MMHG | TEMPERATURE: 96.6 F | BODY MASS INDEX: 29.69 KG/M2 | HEART RATE: 52 BPM | WEIGHT: 219.2 LBS | HEIGHT: 72 IN | SYSTOLIC BLOOD PRESSURE: 104 MMHG

## 2022-12-21 DIAGNOSIS — Z79.4 TYPE 2 DIABETES MELLITUS WITHOUT COMPLICATION, WITH LONG-TERM CURRENT USE OF INSULIN: Primary | Chronic | ICD-10-CM

## 2022-12-21 DIAGNOSIS — I49.9 IRREGULAR HEARTBEAT: ICD-10-CM

## 2022-12-21 DIAGNOSIS — E11.9 TYPE 2 DIABETES MELLITUS WITHOUT COMPLICATION, WITH LONG-TERM CURRENT USE OF INSULIN: ICD-10-CM

## 2022-12-21 DIAGNOSIS — E11.9 TYPE 2 DIABETES MELLITUS WITHOUT COMPLICATION, WITH LONG-TERM CURRENT USE OF INSULIN: Primary | Chronic | ICD-10-CM

## 2022-12-21 DIAGNOSIS — Z79.4 TYPE 2 DIABETES MELLITUS WITHOUT COMPLICATION, WITH LONG-TERM CURRENT USE OF INSULIN: ICD-10-CM

## 2022-12-21 DIAGNOSIS — Z86.79 HISTORY OF CARDIAC MURMUR: ICD-10-CM

## 2022-12-21 DIAGNOSIS — I10 ESSENTIAL HYPERTENSION: Chronic | ICD-10-CM

## 2022-12-21 DIAGNOSIS — Z23 NEED FOR PNEUMOCOCCAL VACCINE: ICD-10-CM

## 2022-12-21 LAB
EXPIRATION DATE: NORMAL
HBA1C MFR BLD: 6.8 %
Lab: NORMAL

## 2022-12-21 PROCEDURE — G0009 ADMIN PNEUMOCOCCAL VACCINE: HCPCS | Performed by: STUDENT IN AN ORGANIZED HEALTH CARE EDUCATION/TRAINING PROGRAM

## 2022-12-21 PROCEDURE — 83036 HEMOGLOBIN GLYCOSYLATED A1C: CPT | Performed by: STUDENT IN AN ORGANIZED HEALTH CARE EDUCATION/TRAINING PROGRAM

## 2022-12-21 PROCEDURE — 3044F HG A1C LEVEL LT 7.0%: CPT | Performed by: STUDENT IN AN ORGANIZED HEALTH CARE EDUCATION/TRAINING PROGRAM

## 2022-12-21 PROCEDURE — 90677 PCV20 VACCINE IM: CPT | Performed by: STUDENT IN AN ORGANIZED HEALTH CARE EDUCATION/TRAINING PROGRAM

## 2022-12-21 PROCEDURE — 99214 OFFICE O/P EST MOD 30 MIN: CPT | Performed by: STUDENT IN AN ORGANIZED HEALTH CARE EDUCATION/TRAINING PROGRAM

## 2022-12-21 PROCEDURE — 93000 ELECTROCARDIOGRAM COMPLETE: CPT | Performed by: STUDENT IN AN ORGANIZED HEALTH CARE EDUCATION/TRAINING PROGRAM

## 2022-12-21 RX ORDER — INSULIN GLARGINE 100 [IU]/ML
27 INJECTION, SOLUTION SUBCUTANEOUS NIGHTLY
Qty: 30 ML | Refills: 1 | COMMUNITY
Start: 2022-12-21

## 2022-12-21 NOTE — PROGRESS NOTES
"Chief Complaint  Rohan Evans is a 67 y.o. male presenting for Diabetes (F/u ) and shringRx.     Retired as  from the [employer]. . 2 children daughter born 1979 and son born 1981.    Patient has a past medical history of T2DM without complications, hypertension, hyperlipidemia, GERD, systolic murmur and allergic rhinitis during spring/summer.    History of Present Illness  Patient is here for follow-up.    Is overall doing well.  He increased his Basaglar insulin to 27 units.  He checks his fasting blood sugar, typically ranges around 110s-125.  The lowest has been in the 90s.    Patient also keeps an eye on his blood pressure.  Typically it ranges in the 120s/70s.    Patient has not noticed any irregular heartbeat.  No shortness of breath, no chest pain.  He has a history of heart murmur and did TTE and stress test in the past, reported normal. Saw cardiology at [facility] ~ 5-10 years ago.    The following portions of the patient's history were reviewed and updated as appropriate: allergies, current medications, past family history, past medical history, past social history, past surgical history and problem list.    Objective  /80 (BP Location: Left arm, Patient Position: Sitting, Cuff Size: Adult)   Pulse 52 Comment: irregular  skipped beats80  Temp 96.6 °F (35.9 °C) (Temporal)   Ht 182.9 cm (72.01\")   Wt 99.4 kg (219 lb 3.2 oz)   BMI 29.72 kg/m²     Physical Exam  Vitals reviewed.   Constitutional:       Appearance: Normal appearance.   HENT:      Head: Normocephalic and atraumatic.      Nose: No congestion.   Eyes:      Extraocular Movements: Extraocular movements intact.      Conjunctiva/sclera: Conjunctivae normal.   Cardiovascular:      Rate and Rhythm: Normal rate. Rhythm irregular.      Heart sounds: Murmur heard.      Comments: Systolic murmur grade 2-3/6 over right second intercostal space.  Pulmonary:      Effort: Pulmonary effort is normal.      Breath " sounds: Normal breath sounds.   Musculoskeletal:      Cervical back: Neck supple.      Right lower leg: No edema.      Left lower leg: No edema.   Skin:     General: Skin is warm and dry.   Neurological:      Mental Status: He is alert and oriented to person, place, and time. Mental status is at baseline.   Psychiatric:         Behavior: Behavior normal.         Thought Content: Thought content normal.             ECG 12 Lead    Date/Time: 12/21/2022 9:31 AM  Performed by: Sav Mulligan MD  Authorized by: Sav Mulligan MD   Comparison: not compared with previous ECG   Previous ECG: no previous ECG available  Rhythm: sinus rhythm  Ectopy: unifocal PVCs  Rate: normal  BPM: 63  Conduction: conduction normal  ST Segments: ST segments normal  Comments: No previous EKG available.  Frequent unifocal PVCs.  Otherwise no significant changes.            Assessment/Plan   1. Type 2 diabetes mellitus without complication, with long-term current use of insulin ([facility])  Hemoglobin A1C   Date Value Ref Range Status   12/21/2022 6.8 % Final   08/02/2022 6.8 % Final   05/02/2022 6.1 % Final   02/04/2021 7.10 (H) 4.80 - 5.60 % Final   Good glycemic control.  Continue on current medications  - POC Glycosylated Hemoglobin (Hb A1C)  - Insulin Glargine (BASAGLAR KWIKPEN) 100 UNIT/ML injection pen; Inject 27 Units under the skin into the appropriate area as directed Every Night. inject into the appropriate area  Dispense: 30 mL; Refill: 1    2. Essential hypertension  BP Readings from Last 3 Encounters:   12/21/22 104/80   08/02/22 134/74   05/02/22 134/84   Blood pressure borderline low systolic today, but asymptomatic.    3. Irregular heartbeat  4. History of cardiac murmur  Frequent unifocal PVCs.  Asymptomatic.  We will request records from cardiology at [facility], graded stress test and TTE in the past.  Will refer for evaluation by cardiology at [facility].  - ECG 12 Lead  - Ambulatory Referral to Cardiology    5. Need  for pneumococcal vaccine  Administered today  - Pneumococcal Conjugate Vaccine 20-Valent All    Patient will check with pharmacy if shingles is covered on his plan.  He can also consider tetanus vaccine and new COVID booster.      Return in about 4 months (around 5/2/2023) for Medicare Wellness.    Sav Mulligan MD  Family Medicine  12/21/2022

## 2022-12-27 NOTE — PROGRESS NOTES
FAXED FOR CARDIOLOGY RECORDS FROM [facility] CARDIOLOGY  FAX# [phone]

## 2023-01-17 DIAGNOSIS — J30.1 SEASONAL ALLERGIC RHINITIS DUE TO POLLEN: ICD-10-CM

## 2023-01-17 RX ORDER — MONTELUKAST SODIUM 10 MG/1
TABLET ORAL
Qty: 90 TABLET | Refills: 1 | Status: SHIPPED | OUTPATIENT
Start: 2023-01-17

## 2023-01-24 NOTE — PROGRESS NOTES
[facility] CARDIOLOGY MAIN CAMPUS    New Patient Office Visit    Patient Name: Rohan Evans Jr.  : 1955   MRN: [id number]   Care Team: Patient Care Team:  Sav Mulligan MD as PCP - General (Family Medicine)  Melchor Waters MD as Cardiologist (Cardiology)  Flores Larkin PA-C as Physician Assistant (Physician Assistant)  Sav Mulligan MD as Consulting Physician (Family Medicine)    Chief Complaint   Patient presents with   • Establish Care     Consultation on irregular heartbeat and history of cardiac murmur     HPI: Rohan Evans Jr. is a 67 y.o. male with a history of diabetes on insulin, hypertension, hyperlipidemia, GERD, seasonal allergies who presents today for further evaluation and management of irregular heartbeat and systolic murmur.  He had been seen by his primary care doctor on 2022 and found to have an irregular heartbeat and EKG in clinic showed sinus rhythm with PVCs.  He has undergone cardiac evaluation in the past with conventional and stress echocardiography but never diagnosed with coronary artery disease.  He has been told that he had a murmur and review shows mild aortic stenosis however he has not been following with a cardiologist recently.  Currently, he denies any symptoms of palpitations, chest pain, dyspnea on exertion, syncope or presyncope.    Subjective   Review of Systems    Past Medical History:   Diagnosis Date   • Drug-induced erectile dysfunction 2021    Increase lisinopril 20->40 mg   • Elevated liver enzymes 2021   • Essential hypertension    • GERD (gastroesophageal reflux disease)    • History of cardiac murmur     Since young age. Done TTE and stress tests. Normal per paitient   • Mixed hyperlipidemia    • Seasonal allergic rhinitis    • Type 2 diabetes mellitus without complication, without long-term current use of insulin ([facility])      Past Surgical History:   Procedure Laterality Date    • HERNIA REPAIR       Social History     Socioeconomic History   • Marital status:    Tobacco Use   • Smoking status: Former     Packs/day: 0.50     Years: 20.     Pack years: 10.00     Types: Cigarettes     Quit date:      Years since quittin.0   • Smokeless tobacco: Never   Vaping Use   • Vaping Use: Never used   Substance and Sexual Activity   • Alcohol use: Yes     Comment: 1 drink occsionally   • Drug use: Never   • Sexual activity: Defer     Family History   Problem Relation Age of Onset   • Cancer Mother    • Diabetes Mother    • Cancer Father    • Diabetes Paternal Grandmother      Social History     Tobacco Use   Smoking Status Former   • Packs/day: 0.50   • Years: 20.00   • Pack years: 10.00   • Types: Cigarettes   • Quit date:    • Years since quittin.0   Smokeless Tobacco Never     Allergies   Allergen Reactions   • Penicillins Swelling     High temp and red blotches       Current Outpatient Medications:   •  amLODIPine (NORVASC) 5 MG tablet, TAKE 1 TABLET DAILY, Disp: 90 tablet, Rfl: 1  •  aspirin 81 MG EC tablet, Take 81 mg by mouth Daily., Disp: , Rfl:   •  Citicoline (COGNITIVE HEALTH PO), Take 300 mg by mouth Daily. Cognizin, Disp: , Rfl:   •  Continuous Blood Gluc  (FreeStyle Saeid 14 Day [place]) device, 1 Device Take As Directed., Disp: 1 each, Rfl: 0  •  Continuous Blood Gluc Sensor (FreeStyle Saeid 2 Sensor) misc, USE AS DIRECTED CHANGE EVERY  14  DAYS, Disp: 2 each, Rfl: 0  •  Cyanocobalamin (B-12) 1000 MCG capsule, Take  by mouth., Disp: , Rfl:   •  ezetimibe (ZETIA) 10 MG tablet, TAKE 1 TABLET DAILY, Disp: 90 tablet, Rfl: 1  •  Insulin Glargine (BASAGLAR KWIKPEN) 100 UNIT/ML injection pen, Inject 27 Units under the skin into the appropriate area as directed Every Night. inject into the appropriate area, Disp: 30 mL, Rfl: 1  •  Insulin Pen Needle 32G X 4 MM misc, 1 Units Daily., Disp: 100 each, Rfl: 11  •  Lancets Mis. (Accu-Chek FastClix Lancet) kit, Use  "to check blood sugar up to three times a day, Disp: 1 kit, Rfl: 3  •  lisinopril (PRINIVIL,ZESTRIL) 20 MG tablet, TAKE 1 TABLET DAILY, Disp: 90 tablet, Rfl: 3  •  metFORMIN (GLUCOPHAGE) 1000 MG tablet, TAKE 1 TABLET TWICE DAILY  WITH MEALS, Disp: 180 tablet, Rfl: 1  •  montelukast (SINGULAIR) 10 MG tablet, TAKE 1 TABLET DAILY, Disp: 90 tablet, Rfl: 1  •  multivitamin with minerals (MULTIVITAMIN ADULTS 50+ PO), Take 1 tablet by mouth Daily., Disp: , Rfl:   •  omeprazole (priLOSEC) 20 MG capsule, TAKE 1 CAPSULE DAILY, Disp: 90 capsule, Rfl: 1  •  OneTouch Verio test strip, Use to check blood sugar up to three times a day, Disp: 100 each, Rfl: 3    Objective     Vitals:    01/25/23 1048   BP: 130/88   BP Location: Right arm   Patient Position: Sitting   Cuff Size: Adult   Pulse: 63   Resp: 18   SpO2: 98%   Weight: 101 kg (222 lb)   Height: 182.9 cm (72.01\")     Body mass index is 30.1 kg/m².    Physical Exam  Constitutional:       Appearance: Normal appearance.   HENT:      Head: Normocephalic and atraumatic.      Nose: No congestion.   Eyes:      General: No scleral icterus.     Conjunctiva/sclera: Conjunctivae normal.   Neck:      Vascular: No carotid bruit.   Cardiovascular:      Rate and Rhythm: Normal rate and regular rhythm.      Pulses: Normal pulses.      Heart sounds: Murmur heard.    Crescendo decrescendo systolic murmur is present with a grade of 3/6.  Pulmonary:      Effort: Pulmonary effort is normal. No respiratory distress.      Breath sounds: Normal breath sounds. No wheezing or rales.   Abdominal:      General: Bowel sounds are normal. There is no distension.      Palpations: Abdomen is soft.      Tenderness: There is no abdominal tenderness.   Musculoskeletal:         General: No swelling. Normal range of motion.      Cervical back: Normal range of motion and neck supple.   Skin:     General: Skin is warm and dry.      Capillary Refill: Capillary refill takes less than 2 seconds.      Coloration: Skin " is not jaundiced.   Neurological:      General: No focal deficit present.      Mental Status: He is alert and oriented to person, place, and time.      Gait: Gait normal.   Psychiatric:         Mood and Affect: Mood normal.         Behavior: Behavior normal.         Thought Content: Thought content normal.         Judgment: Judgment normal.       Most recent PCP note, imaging tests, and labs reviewed.    Labs:    Lab Results   Component Value Date    GLUCOSE 128 (H) 05/02/2022    BUN 16 05/02/2022    CREATININE 0.91 05/02/2022    EGFRIFNONA 79 09/07/2021    BCR 17.6 05/02/2022    K 4.2 05/02/2022    CO2 22.8 05/02/2022    CALCIUM 9.8 05/02/2022    ALBUMIN 4.50 05/02/2022    AST 20 05/02/2022    ALT 18 05/02/2022     Lab Results   Component Value Date    HGBA1C 6.8 12/21/2022     Lab Results   Component Value Date    CHOL 195 05/02/2022    TRIG 80 05/02/2022    HDL 59 05/02/2022     (H) 05/02/2022     Echocardiogram January 2019  1. left ventricle dimensions are normal, LVH with basal septal thickening.  Wall motion normal, LVEF 68%  2.  Grade 1 diastolic dysfunction suggestive of impaired relaxation  3.  Mild aortic valve stenosis with mild aortic regurgitation.  Mean gradient 17 mmHg, peak velocity 2.8 m/s, MICHELET 1.4 cm²  4.  Mild tricuspid regurgitation, RVSP 31 mmHg    Exercise stress echocardiogram March 22, 2017  1.  Normal exercise stress echocardiogram.  LVEF at peak exercise greater than 65%  2.  Normal resting 2D echocardiogram, LVEF 60%  3.  No obvious evidence of ischemia noted  4.  Hypertensive response noted with exercise    EKG December 5, 2018  Sinus bradycardia heart rate 54, 1 PVC      ECG 12 Lead    Date/Time: 1/25/2023 6:15 PM  Performed by: Melchor Waters MD  Authorized by: Melchor Waters MD   Comparison: compared with previous ECG from 12/21/2022  Similar to previous ECG  Comparison to previous ECG: Similar to previous, no PVCs seen today  Rhythm: sinus rhythm  Rate: normal  BPM:  63  Conduction: conduction normal  ST Segments: ST segments normal  T Waves: T waves normal  QRS axis: normal  Other: no other findings    Clinical impression: normal ECG            Advance Care Planning   ACP discussion was held with the patient during this visit. Patient does not have an advance directive, information provided.       Assessment & Plan       ICD-10-CM ICD-9-CM   1. PVC (premature ventricular contraction)  I49.3 427.69   2. Aortic stenosis, mild  I35.0 424.1   3. Aortic valve insufficiency, etiology of cardiac valve disease unspecified  I35.1 424.1   4. Tricuspid valve insufficiency, unspecified etiology  I07.1 397.0       Systolic murmur - history of mild AS, mild AR, mild TR   - Recheck echocardiogram, last was 3 years ago   - Severity of his valvular disease which does not appear severe on exam determine his echocardiographic follow-up interval  PVCs   - none on ECG in the office today   - We will check 48-hour monitor for better quantification of his ectopy burden    Primary prevention of ASCVD  The 10-year ASCVD risk score (Everardo HERR, et al., 2019) is: 32.4%    Values used to calculate the score:      Age: 67 years      Sex: Male      Is Non- : No      Diabetic: Yes      Tobacco smoker: No      Systolic Blood Pressure: 130 mmHg      Is BP treated: Yes      HDL Cholesterol: 59 mg/dL      Total Cholesterol: 195 mg/dL   - Elevated 10-year risk score would recommend initiation of statin for primary prevention    Hypertension   - Acceptable control today, on lisinopril with compelling indication with diabetes    Return in about 6 months (around 7/25/2023).    VALENTE Waters MD, MS  01/25/23    [facility] Cardiology  [address] [place] [id number] [phone]

## 2023-01-25 ENCOUNTER — OFFICE VISIT (OUTPATIENT)
Dept: CARDIOLOGY | Facility: CLINIC | Age: 68
End: 2023-01-25
Payer: MEDICARE

## 2023-01-25 VITALS
HEIGHT: 72 IN | OXYGEN SATURATION: 98 % | DIASTOLIC BLOOD PRESSURE: 88 MMHG | HEART RATE: 63 BPM | RESPIRATION RATE: 18 BRPM | BODY MASS INDEX: 30.07 KG/M2 | WEIGHT: 222 LBS | SYSTOLIC BLOOD PRESSURE: 130 MMHG

## 2023-01-25 DIAGNOSIS — I35.1 AORTIC VALVE INSUFFICIENCY, ETIOLOGY OF CARDIAC VALVE DISEASE UNSPECIFIED: ICD-10-CM

## 2023-01-25 DIAGNOSIS — I07.1 TRICUSPID VALVE INSUFFICIENCY, UNSPECIFIED ETIOLOGY: ICD-10-CM

## 2023-01-25 DIAGNOSIS — I35.0 AORTIC STENOSIS, MILD: ICD-10-CM

## 2023-01-25 DIAGNOSIS — I49.3 PVC (PREMATURE VENTRICULAR CONTRACTION): Primary | ICD-10-CM

## 2023-01-25 PROCEDURE — 93000 ELECTROCARDIOGRAM COMPLETE: CPT | Performed by: INTERNAL MEDICINE

## 2023-01-25 PROCEDURE — 99204 OFFICE O/P NEW MOD 45 MIN: CPT | Performed by: INTERNAL MEDICINE

## 2023-01-25 RX ORDER — MULTIPLE VITAMINS W/ MINERALS TAB 9MG-400MCG
1 TAB ORAL DAILY
COMMUNITY

## 2023-01-25 RX ORDER — CHOLECALCIFEROL (VITAMIN D3) 25 MCG
TABLET,CHEWABLE ORAL
COMMUNITY

## 2023-01-25 RX ORDER — ASPIRIN 81 MG/1
81 TABLET ORAL DAILY
COMMUNITY

## 2023-02-03 ENCOUNTER — TELEPHONE (OUTPATIENT)
Dept: CARDIOLOGY | Facility: CLINIC | Age: 68
End: 2023-02-03
Payer: MEDICARE

## 2023-02-03 RX ORDER — METOPROLOL SUCCINATE 50 MG/1
50 TABLET, EXTENDED RELEASE ORAL DAILY
Qty: 30 TABLET | Refills: 11 | Status: SHIPPED | OUTPATIENT
Start: 2023-02-03 | End: 2023-02-24 | Stop reason: SDUPTHER

## 2023-02-03 NOTE — TELEPHONE ENCOUNTER
The patient's monitor showed frequent non-sustained VT episodes, longest of 8 beats which all occurred over a relatively short stretch in the evening of 1/26. I called Mr. Evans to discuss these results and he doesn't recall having any significant symptoms. Will start metoprolol succinate 50mg daily and set aside amlodipine for now.     VALENTE Waters MD  02/03/23 16:05 EST

## 2023-02-04 DIAGNOSIS — E11.9 TYPE 2 DIABETES MELLITUS WITHOUT COMPLICATION, WITH LONG-TERM CURRENT USE OF INSULIN: ICD-10-CM

## 2023-02-04 DIAGNOSIS — Z79.4 TYPE 2 DIABETES MELLITUS WITHOUT COMPLICATION, WITH LONG-TERM CURRENT USE OF INSULIN: ICD-10-CM

## 2023-02-06 NOTE — TELEPHONE ENCOUNTER
Rx Refill Note  Requested Prescriptions     Pending Prescriptions Disp Refills   • Continuous Blood Gluc Sensor (FreeStyle Saeid 2 Sensor) misc [Pharmacy Med Name: FREESTYLE SAEID 2 SENSOR KIT] 2 each 0     Sig: AS DIRECTED CHANGE  EVERY  14  DAYS      Last office visit with prescribing clinician: 12/21/2022   Last telemedicine visit with prescribing clinician: 5/4/2023   Next office visit with prescribing clinician: 5/4/2023                         Would you like a call back once the refill request has been completed: [] Yes [] No    If the office needs to give you a call back, can they leave a voicemail: [] Yes [] No    Armen Albarado MA  02/06/23, 09:22 EST

## 2023-02-15 ENCOUNTER — PATIENT MESSAGE (OUTPATIENT)
Dept: INTERNAL MEDICINE | Facility: CLINIC | Age: 68
End: 2023-02-15
Payer: MEDICARE

## 2023-02-15 DIAGNOSIS — Z79.4 TYPE 2 DIABETES MELLITUS WITHOUT COMPLICATION, WITH LONG-TERM CURRENT USE OF INSULIN: Chronic | ICD-10-CM

## 2023-02-15 DIAGNOSIS — E11.9 TYPE 2 DIABETES MELLITUS WITHOUT COMPLICATION, WITH LONG-TERM CURRENT USE OF INSULIN: Chronic | ICD-10-CM

## 2023-02-15 NOTE — TELEPHONE ENCOUNTER
From: Rohan Evans Jr.  To: Sav Mulligan  Sent: 2/15/2023 10:34 AM EST  Subject: BD Mitzy pen needles    Good morning,   I need a refill on the pen needles. I am using the BD Mitzy 0.23mm x 4mm, 32G x 5/32.   [facility] .  Thank you   Have a great day

## 2023-02-17 ENCOUNTER — HOSPITAL ENCOUNTER (OUTPATIENT)
Dept: CARDIOLOGY | Facility: HOSPITAL | Age: 68
Discharge: HOME | End: 2023-02-17
Admitting: INTERNAL MEDICINE
Payer: MEDICARE

## 2023-02-17 VITALS
SYSTOLIC BLOOD PRESSURE: 159 MMHG | HEIGHT: 72 IN | BODY MASS INDEX: 30.07 KG/M2 | WEIGHT: 222 LBS | DIASTOLIC BLOOD PRESSURE: 89 MMHG

## 2023-02-17 DIAGNOSIS — I35.0 AORTIC STENOSIS, MILD: ICD-10-CM

## 2023-02-17 PROCEDURE — 93306 TTE W/DOPPLER COMPLETE: CPT

## 2023-02-17 PROCEDURE — 93306 TTE W/DOPPLER COMPLETE: CPT | Performed by: INTERNAL MEDICINE

## 2023-02-23 LAB
ASCENDING AORTA: 3.5 CM
BH CV ECHO MEAS - AI P1/2T: 608.7 MSEC
BH CV ECHO MEAS - AO MAX PG: 26.5 MMHG
BH CV ECHO MEAS - AO MEAN PG: 14.2 MMHG
BH CV ECHO MEAS - AO ROOT AREA (BSA CORRECTED): 1.7 CM2
BH CV ECHO MEAS - AO ROOT DIAM: 3.8 CM
BH CV ECHO MEAS - AO V2 MAX: 255.4 CM/SEC
BH CV ECHO MEAS - AO V2 VTI: 56.6 CM
BH CV ECHO MEAS - AVA(I,D): 1.8 CM2
BH CV ECHO MEAS - EDV(CUBED): 157.5 ML
BH CV ECHO MEAS - EDV(MOD-SP2): 126 ML
BH CV ECHO MEAS - EDV(MOD-SP4): 112 ML
BH CV ECHO MEAS - EF(MOD-BP): 61.2 %
BH CV ECHO MEAS - EF(MOD-SP2): 67.3 %
BH CV ECHO MEAS - EF(MOD-SP4): 57.1 %
BH CV ECHO MEAS - ESV(CUBED): 49.7 ML
BH CV ECHO MEAS - ESV(MOD-SP2): 41.2 ML
BH CV ECHO MEAS - ESV(MOD-SP4): 48 ML
BH CV ECHO MEAS - FS: 31.9 %
BH CV ECHO MEAS - IVS/LVPW: 1.22 CM
BH CV ECHO MEAS - IVSD: 1.1 CM
BH CV ECHO MEAS - LA DIMENSION: 3.2 CM
BH CV ECHO MEAS - LAT PEAK E' VEL: 8.9 CM/SEC
BH CV ECHO MEAS - LV DIASTOLIC VOL/BSA (35-75): 50.3 CM2
BH CV ECHO MEAS - LV MASS(C)D: 206.7 GRAMS
BH CV ECHO MEAS - LV MAX PG: 4.1 MMHG
BH CV ECHO MEAS - LV MEAN PG: 2 MMHG
BH CV ECHO MEAS - LV SYSTOLIC VOL/BSA (12-30): 21.6 CM2
BH CV ECHO MEAS - LV V1 MAX: 101.7 CM/SEC
BH CV ECHO MEAS - LV V1 VTI: 28.7 CM
BH CV ECHO MEAS - LVIDD: 5.4 CM
BH CV ECHO MEAS - LVIDS: 3.7 CM
BH CV ECHO MEAS - LVOT AREA: 3.5 CM2
BH CV ECHO MEAS - LVOT DIAM: 2.12 CM
BH CV ECHO MEAS - LVPWD: 0.9 CM
BH CV ECHO MEAS - MED PEAK E' VEL: 6.3 CM/SEC
BH CV ECHO MEAS - MV A MAX VEL: 79.7 CM/SEC
BH CV ECHO MEAS - MV DEC SLOPE: 293.6 CM/SEC2
BH CV ECHO MEAS - MV DEC TIME: 0.39 MSEC
BH CV ECHO MEAS - MV E MAX VEL: 69.8 CM/SEC
BH CV ECHO MEAS - MV E/A: 0.88
BH CV ECHO MEAS - MV P1/2T: 81.5 MSEC
BH CV ECHO MEAS - MVA(P1/2T): 2.7 CM2
BH CV ECHO MEAS - PA ACC TIME: 0.11 SEC
BH CV ECHO MEAS - PA PR(ACCEL): 28.3 MMHG
BH CV ECHO MEAS - SI(MOD-SP2): 38.1 ML/M2
BH CV ECHO MEAS - SI(MOD-SP4): 28.7 ML/M2
BH CV ECHO MEAS - SV(LVOT): 101.6 ML
BH CV ECHO MEAS - SV(MOD-SP2): 84.8 ML
BH CV ECHO MEAS - SV(MOD-SP4): 64 ML
BH CV ECHO MEAS - TAPSE (>1.6): 2.48 CM
BH CV ECHO MEASUREMENTS AVERAGE E/E' RATIO: 9.18
BH CV XLRA - RV BASE: 3.2 CM
BH CV XLRA - RV LENGTH: 6.8 CM
BH CV XLRA - RV MID: 2.8 CM
BH CV XLRA - TDI S': 12 CM/SEC
LEFT ATRIUM VOLUME INDEX: 33.6 ML/M2
MAXIMAL PREDICTED HEART RATE: 151 BPM
STRESS TARGET HR: 128 BPM

## 2023-02-24 RX ORDER — METOPROLOL SUCCINATE 50 MG/1
50 TABLET, EXTENDED RELEASE ORAL DAILY
Qty: 30 TABLET | Refills: 11 | Status: SHIPPED | OUTPATIENT
Start: 2023-02-24

## 2023-02-24 NOTE — TELEPHONE ENCOUNTER
Wife, Trinidad, informed of echo results per Dr. Waters. Verbalized understanding.    ----- Message from Melchor Waters MD sent at 2/24/2023  9:04 AM EST -----  Can you please call the patient to let them know about their echocardiogram showed stable function of their aortic valve, the narrowing appears stable from last time we had checked.

## 2023-04-24 DIAGNOSIS — E11.9 TYPE 2 DIABETES MELLITUS WITHOUT COMPLICATION, WITH LONG-TERM CURRENT USE OF INSULIN: Chronic | ICD-10-CM

## 2023-04-24 DIAGNOSIS — E11.9 TYPE 2 DIABETES MELLITUS WITHOUT COMPLICATION, WITHOUT LONG-TERM CURRENT USE OF INSULIN: ICD-10-CM

## 2023-04-24 DIAGNOSIS — K21.9 GASTROESOPHAGEAL REFLUX DISEASE WITHOUT ESOPHAGITIS: ICD-10-CM

## 2023-04-24 DIAGNOSIS — Z79.4 TYPE 2 DIABETES MELLITUS WITHOUT COMPLICATION, WITH LONG-TERM CURRENT USE OF INSULIN: Chronic | ICD-10-CM

## 2023-04-24 RX ORDER — INSULIN GLARGINE 100 [IU]/ML
INJECTION, SOLUTION SUBCUTANEOUS
Qty: 30 ML | Refills: 1 | Status: SHIPPED | OUTPATIENT
Start: 2023-04-24

## 2023-04-24 RX ORDER — EZETIMIBE 10 MG/1
TABLET ORAL
Qty: 90 TABLET | Refills: 1 | Status: SHIPPED | OUTPATIENT
Start: 2023-04-24

## 2023-04-24 RX ORDER — OMEPRAZOLE 20 MG/1
CAPSULE, DELAYED RELEASE ORAL
Qty: 90 CAPSULE | Refills: 1 | Status: SHIPPED | OUTPATIENT
Start: 2023-04-24

## 2023-05-18 ENCOUNTER — LAB (OUTPATIENT)
Dept: LAB | Facility: HOSPITAL | Age: 68
End: 2023-05-18
Payer: MEDICARE

## 2023-05-18 ENCOUNTER — OFFICE VISIT (OUTPATIENT)
Dept: INTERNAL MEDICINE | Facility: CLINIC | Age: 68
End: 2023-05-18
Payer: MEDICARE

## 2023-05-18 VITALS
WEIGHT: 224.2 LBS | HEART RATE: 56 BPM | DIASTOLIC BLOOD PRESSURE: 82 MMHG | TEMPERATURE: 98.6 F | HEIGHT: 72 IN | SYSTOLIC BLOOD PRESSURE: 138 MMHG | BODY MASS INDEX: 30.37 KG/M2

## 2023-05-18 DIAGNOSIS — E78.2 MIXED HYPERLIPIDEMIA: Chronic | ICD-10-CM

## 2023-05-18 DIAGNOSIS — R80.9 TYPE 2 DIABETES MELLITUS WITH MICROALBUMINURIA, WITH LONG-TERM CURRENT USE OF INSULIN: Chronic | ICD-10-CM

## 2023-05-18 DIAGNOSIS — E66.9 OBESITY, CLASS I, BMI 30-34.9: ICD-10-CM

## 2023-05-18 DIAGNOSIS — E11.29 TYPE 2 DIABETES MELLITUS WITH MICROALBUMINURIA, WITH LONG-TERM CURRENT USE OF INSULIN: Chronic | ICD-10-CM

## 2023-05-18 DIAGNOSIS — I10 ESSENTIAL HYPERTENSION: Chronic | ICD-10-CM

## 2023-05-18 DIAGNOSIS — Z79.4 TYPE 2 DIABETES MELLITUS WITH MICROALBUMINURIA, WITH LONG-TERM CURRENT USE OF INSULIN: Chronic | ICD-10-CM

## 2023-05-18 DIAGNOSIS — I49.3 PVC'S (PREMATURE VENTRICULAR CONTRACTIONS): Chronic | ICD-10-CM

## 2023-05-18 DIAGNOSIS — Z13.6 SCREENING FOR AAA (ABDOMINAL AORTIC ANEURYSM): ICD-10-CM

## 2023-05-18 DIAGNOSIS — R01.1 SYSTOLIC MURMUR: Chronic | ICD-10-CM

## 2023-05-18 DIAGNOSIS — Z00.00 ENCOUNTER FOR SUBSEQUENT ANNUAL WELLNESS VISIT (AWV) IN MEDICARE PATIENT: Primary | ICD-10-CM

## 2023-05-18 LAB
ALBUMIN SERPL-MCNC: 4.8 G/DL (ref 3.5–5.2)
ALBUMIN/GLOB SERPL: 1.9 G/DL
ALP SERPL-CCNC: 41 U/L (ref 39–117)
ALT SERPL W P-5'-P-CCNC: 34 U/L (ref 1–41)
ANION GAP SERPL CALCULATED.3IONS-SCNC: 11 MMOL/L (ref 5–15)
AST SERPL-CCNC: 33 U/L (ref 1–40)
BILIRUB SERPL-MCNC: 0.5 MG/DL (ref 0–1.2)
BUN SERPL-MCNC: 21 MG/DL (ref 8–23)
BUN/CREAT SERPL: 20.4 (ref 7–25)
CALCIUM SPEC-SCNC: 9.7 MG/DL (ref 8.6–10.5)
CHLORIDE SERPL-SCNC: 102 MMOL/L (ref 98–107)
CHOLEST SERPL-MCNC: 197 MG/DL (ref 0–200)
CO2 SERPL-SCNC: 27 MMOL/L (ref 22–29)
CREAT SERPL-MCNC: 1.03 MG/DL (ref 0.76–1.27)
EGFRCR SERPLBLD CKD-EPI 2021: 78.6 ML/MIN/1.73
EXPIRATION DATE: NORMAL
EXPIRATION DATE: NORMAL
GLOBULIN UR ELPH-MCNC: 2.5 GM/DL
GLUCOSE SERPL-MCNC: 131 MG/DL (ref 65–99)
HBA1C MFR BLD: 8.3 %
HDLC SERPL-MCNC: 53 MG/DL (ref 40–60)
LDLC SERPL CALC-MCNC: 127 MG/DL (ref 0–100)
LDLC/HDLC SERPL: 2.36 {RATIO}
Lab: NORMAL
Lab: NORMAL
POC CREATININE URINE: NORMAL
POC MICROALBUMIN URINE: NORMAL
POTASSIUM SERPL-SCNC: 4.1 MMOL/L (ref 3.5–5.2)
PROT SERPL-MCNC: 7.3 G/DL (ref 6–8.5)
SODIUM SERPL-SCNC: 140 MMOL/L (ref 136–145)
TRIGL SERPL-MCNC: 95 MG/DL (ref 0–150)
VLDLC SERPL-MCNC: 17 MG/DL (ref 5–40)

## 2023-05-18 PROCEDURE — 80053 COMPREHEN METABOLIC PANEL: CPT

## 2023-05-18 PROCEDURE — 80061 LIPID PANEL: CPT

## 2023-05-18 RX ORDER — SEMAGLUTIDE 1.34 MG/ML
0.25 INJECTION, SOLUTION SUBCUTANEOUS WEEKLY
Qty: 1.5 ML | Refills: 0 | Status: SHIPPED | OUTPATIENT
Start: 2023-05-18

## 2023-05-18 NOTE — PROGRESS NOTES
QUICK REFERENCE INFORMATION:  The ABCs of the Annual Wellness Visit    Subsequent Medicare Wellness Visit    Retired  with the [employer]. . 2 children daughter born  and son born .     Patient has a past medical history of T2DM without complications, hypertension, hyperlipidemia, PVCs, non-sustained VT (longest 8 beats, Holter 2/3/23), systolic murmur (TTE 2/3/23 EF 61%, mild AS, mild AR, mild TR, est cards Dr. Waters), GERD and allergic rhinitis during spring/summer.    Patient is here for annual Medicare visit.    He is overall doing well, but has not been checking his blood sugar last couple of months.  He does have a freestyle maria fernanda to use.  He stays active playing golf, cutting grass outside and does exercise.  He is currently on Basaglar 27 units daily.    He saw cardiology and did not have any significant valvular disease, EF was normal, Holter showed nonsustained VT max 8 beats and PVCs.  I discontinued his amlodipine and he was started on metoprolol and has tolerated well.    He does report occasional mild lower extremity swelling when he is sitting for longer time, like driving a car, but resolves quickly.       HEALTH RISK ASSESSMENT    1955    Recent Hospitalizations:  No hospitalization(s) within the last year..        Current Medical Providers:  Patient Care Team:  Sav Mulligan MD as PCP - General (Family Medicine)  Melchor Waters MD as Cardiologist (Cardiology)  Flores Larkin PA-C as Physician Assistant (Physician Assistant)  Sav Mulligan MD as Consulting Physician (Family Medicine)        Smoking Status:  Social History     Tobacco Use   Smoking Status Former   • Packs/day: 0.50   • Years: 20.00   • Pack years: 10.00   • Types: Cigarettes   • Quit date: 1998   • Years since quittin.3   Smokeless Tobacco Never       Alcohol Consumption:  Social History     Substance and Sexual Activity   Alcohol Use Yes    Comment: occasional        Depression Screen:       2023     8:15 AM   PHQ-2/PHQ-9 Depression Screening   Little Interest or Pleasure in Doing Things 0-->not at all   Feeling Down, Depressed or Hopeless 0-->not at all   PHQ-9: Brief Depression Severity Measure Score 0       Health Habits and Functional and Cognitive Screenin/18/2023     8:15 AM   Functional & Cognitive Status   Do you have difficulty preparing food and eating? No   Do you have difficulty bathing yourself, getting dressed or grooming yourself? No   Do you have difficulty using the toilet? No   Do you have difficulty moving around from place to place? No   Do you have trouble with steps or getting out of a bed or a chair? No   Current Diet Well Balanced Diet   Dental Exam Up to date   Eye Exam Up to date   Exercise (times per week) 2 times per week   Current Exercises Include Cardiovascular Workout   Do you need help using the phone?  No   Are you deaf or do you have serious difficulty hearing?  No   Do you need help with transportation? No   Do you need help shopping? No   Do you need help preparing meals?  No   Do you need help with housework?  No   Do you need help with laundry? No   Do you need help taking your medications? No   Do you need help managing money? No   Do you ever drive or ride in a car without wearing a seat belt? No   Have you felt unusual stress, anger or loneliness in the last month? No   Who do you live with? Spouse   If you need help, do you have trouble finding someone available to you? No   Have you been bothered in the last four weeks by sexual problems? No   Do you have difficulty concentrating, remembering or making decisions? No       Fall Risk Screen:  STEADI Fall Risk Assessment was completed, and patient is at LOW risk for falls.Assessment completed on:2023    ACE III MINI        Does the patient have evidence of cognitive impairment? No    Aspirin use counseling: Does not need ASA (and currently is not on it)    Recent  Lab Results:  CMP:  Lab Results   Component Value Date    BUN 16 05/02/2022    CREATININE 0.91 05/02/2022    EGFRIFNONA 79 09/07/2021    BCR 17.6 05/02/2022     05/02/2022    K 4.2 05/02/2022    CO2 22.8 05/02/2022    CALCIUM 9.8 05/02/2022    ALBUMIN 4.50 05/02/2022    BILITOT 0.4 05/02/2022    ALKPHOS 30 (L) 05/02/2022    AST 20 05/02/2022    ALT 18 05/02/2022     HbA1c:  Lab Results   Component Value Date    HGBA1C 8.3 05/18/2023    HGBA1C 6.8 12/21/2022     Microalbumin:  Lab Results   Component Value Date    MICROALBUR <1.2 05/02/2022    POCMALB 30 mg/L 05/18/2023    POCCREAT 300 mg/dL 05/18/2023     Lipid Panel  Lab Results   Component Value Date    CHOL 195 05/02/2022    TRIG 80 05/02/2022    HDL 59 05/02/2022     (H) 05/02/2022    AST 20 05/02/2022    ALT 18 05/02/2022       Visual Acuity:  No results found.    Age-appropriate Screening Schedule:  Refer to the list below for future screening recommendations based on patient's age, sex and/or medical conditions. Orders for these recommended tests are listed in the plan section. The patient has been provided with a written plan.    Health Maintenance   Topic Date Due   • ZOSTER VACCINE (1 of 2) Never done   • TDAP/TD VACCINES (2 - Td or Tdap) 02/25/2021   • COVID-19 Vaccine (3 - Booster for Sahil series) 12/28/2021   • AAA SCREEN (ONE-TIME)  Never done   • DIABETIC FOOT EXAM  05/02/2023   • LIPID PANEL  05/02/2023   • DIABETIC EYE EXAM  05/26/2023   • INFLUENZA VACCINE  08/01/2023   • HEMOGLOBIN A1C  11/18/2023   • ANNUAL WELLNESS VISIT  05/18/2024   • URINE MICROALBUMIN  05/18/2024   • COLORECTAL CANCER SCREENING  06/08/2025   • HEPATITIS C SCREENING  Completed   • Pneumococcal Vaccine 65+  Completed        Subjective   History of Present Illness    Rohan Armen Evans Jr. is a 67 y.o. male who presents for a Subsequent Wellness Visit.    CHRONIC CONDITIONS    The following portions of the patient's history were reviewed and updated as  appropriate: allergies, current medications, past family history, past medical history, past social history, past surgical history and problem list.    Outpatient Medications Prior to Visit   Medication Sig Dispense Refill   • Citicoline (COGNITIVE HEALTH PO) Take 300 mg by mouth Daily. Cognizin     • Continuous Blood Gluc  (FreeStyle Saeid 14 Day [place]) device 1 Device Take As Directed. 1 each 0   • Cyanocobalamin (B-12) 1000 MCG capsule Take  by mouth.     • ezetimibe (ZETIA) 10 MG tablet TAKE 1 TABLET DAILY 90 tablet 1   • Insulin Glargine (BASAGLAR KWIKPEN) 100 UNIT/ML injection pen INJECT 25 UNITS            SUBCUTANEOUSLY INTO THE    APPROPRIATE AREA AS        DIRECTED EVERY NIGHT 30 mL 1   • Insulin Pen Needle 32G X 4 MM misc 1 Units Daily. 100 each 11   • Lancets Misc. (Accu-Chek FastClix Lancet) kit Use to check blood sugar up to three times a day 1 kit 3   • lisinopril (PRINIVIL,ZESTRIL) 20 MG tablet TAKE 1 TABLET DAILY 90 tablet 3   • metFORMIN (GLUCOPHAGE) 1000 MG tablet TAKE 1 TABLET TWICE DAILY  WITH MEALS 180 tablet 1   • metoprolol succinate XL (TOPROL-XL) 50 MG 24 hr tablet Take 1 tablet by mouth Daily. 30 tablet 11   • montelukast (SINGULAIR) 10 MG tablet TAKE 1 TABLET DAILY 90 tablet 1   • multivitamin with minerals tablet tablet Take 1 tablet by mouth Daily.     • omeprazole (priLOSEC) 20 MG capsule TAKE 1 CAPSULE DAILY 90 capsule 1   • OneTouch Verio test strip Use to check blood sugar up to three times a day 100 each 3   • amLODIPine (NORVASC) 5 MG tablet TAKE 1 TABLET DAILY 90 tablet 1   • aspirin 81 MG EC tablet Take 81 mg by mouth Daily.     • Continuous Blood Gluc Sensor (FreeStyle Saeid 2 Sensor) misc AS DIRECTED CHANGE  EVERY  14  DAYS 2 each 0     No facility-administered medications prior to visit.       Patient Active Problem List   Diagnosis   • Type 2 diabetes mellitus with microalbuminuria, with long-term current use of insulin   • Essential hypertension   •  Gastroesophageal reflux disease without esophagitis   • Mixed hyperlipidemia   • Seasonal allergic rhinitis due to pollen   • Systolic murmur   • Obesity, Class I, BMI 30-34.9   • Polyp of colon   • Drug-induced erectile dysfunction   • Elevated liver enzymes   • PVC's (premature ventricular contractions)       Advance Care Planning:  ACP discussion was held with the patient during this visit. Patient does not have an advance directive, information provided.    Identification of Risk Factors:  Risk factors include: Advance Directive Discussion  Immunizations Discussed/Encouraged (specific immunizations; Tdap, Shingrix and COVID19 ).    Review of Systems    Compared to one year ago, the patient feels his physical health is the same.  Compared to one year ago, the patient feels his mental health is the same.    Objective     Physical Exam  Vitals reviewed.   Constitutional:       Appearance: Normal appearance.   HENT:      Head: Normocephalic and atraumatic.      Nose: No congestion.   Eyes:      Extraocular Movements: Extraocular movements intact.      Conjunctiva/sclera: Conjunctivae normal.   Cardiovascular:      Rate and Rhythm: Normal rate and regular rhythm.      Pulses:           Dorsalis pedis pulses are 2+ on the right side and 2+ on the left side.        Posterior tibial pulses are 2+ on the right side and 2+ on the left side.      Heart sounds: Murmur heard.      Comments: Systolic murmur grade 3/6  Pulmonary:      Effort: Pulmonary effort is normal.      Breath sounds: Normal breath sounds.   Abdominal:      General: There is no distension.      Palpations: Abdomen is soft. There is no mass.      Tenderness: There is no abdominal tenderness.   Musculoskeletal:      Cervical back: Neck supple.      Right lower leg: No edema.      Left lower leg: No edema.      Right foot: Normal range of motion.      Left foot: Normal range of motion.   Feet:      Right foot:      Protective Sensation: 10 sites tested. 10  "sites sensed.      Skin integrity: Skin integrity normal. No ulcer or skin breakdown.      Toenail Condition: Right toenails are normal.      Left foot:      Protective Sensation: 10 sites tested. 10 sites sensed.      Skin integrity: Skin integrity normal. No ulcer or skin breakdown.      Toenail Condition: Left toenails are normal.      Comments: Diabetic Foot Exam Performed and Monofilament Test Performed  Normal  Skin:     General: Skin is warm and dry.   Neurological:      Mental Status: He is alert and oriented to person, place, and time. Mental status is at baseline.   Psychiatric:         Behavior: Behavior normal.         Thought Content: Thought content normal.          Procedures     Vitals:    05/18/23 0813   BP: 138/82   BP Location: Left arm   Patient Position: Sitting   Cuff Size: Adult   Pulse: 56   Temp: 98.6 °F (37 °C)   TempSrc: Temporal   Weight: 102 kg (224 lb 3.2 oz)   Height: 182 cm (71.65\")       BMI is >= 30 and <35. (Class 1 Obesity). The following options were offered after discussion;: exercise counseling/recommendations and nutrition counseling/recommendations      Assessment & Plan     1. Encounter for subsequent annual wellness visit (AWV) in Medicare patient    2. Type 2 diabetes mellitus with microalbuminuria, with long-term current use of insulin  Hemoglobin A1C   Date Value Ref Range Status   05/18/2023 8.3 % Final   12/21/2022 6.8 % Final   08/02/2022 6.8 % Final   02/04/2021 7.10 (H) 4.80 - 5.60 % Final   Worsening glycemic control, now also with microalbuminuria.  He is already on ACE inhibitor.  We discussed options for treatment.  He should continue on metformin and insulin, I recommend adding semaglutide/Ozempic for better glycemic control.  This can also help him lose weight.  He is interested in trying this medication.  Counseled on side effects including loss of appetite, abdominal discomfort, delayed gastric emptying, increased risk for certain infections like urine and " yeast, also possibility for abdominal pain.  With severe abdominal pain he should get in touch right away, this could be concerning for pancreatitis.  He should also stop the medication right away.  Patient verbalized understanding.  We will do follow-up in 4 weeks to see if she tolerates Ozempic.  If there is any issues with cost or coverage and he does not start on the medication I would increase his insulin, he will get back in touch.  - POC Microalbumin  - POC Glycosylated Hemoglobin (Hb A1C)  - Comprehensive Metabolic Panel; Future  - Semaglutide,0.25 or 0.5MG/DOS, (Ozempic, 0.25 or 0.5 MG/DOSE,) 2 MG/1.5ML solution pen-injector; Inject 0.25 mg under the skin into the appropriate area as directed 1 (One) Time Per Week.  Dispense: 1.5 mL; Refill: 0    3. Essential hypertension  BP Readings from Last 3 Encounters:   05/18/23 138/82   02/17/23 159/89   01/25/23 130/88   Blood pressure improved.  Continue on current medications.    4. Mixed hyperlipidemia  Patient is fasting for lipids today  - Lipid Panel; Future    5. Systolic murmur  Stable, mild valvular disease    6. PVC's (premature ventricular contractions)  No current symptoms.  Continue on metoprolol    7. Screening for AAA (abdominal aortic aneurysm)  Patient never screening for AAA, he did smoke in the past and is amenable  - [place] aaa screen limited; Future    8. Obesity, Class I, BMI 30-34.9      Patient Self-Management and Personalized Health Advice  The patient has been provided with information about: exercise and weight management and preventive services including:   · Annual Wellness Visit (AWV)  · Ultrasound Screening for Abdominal Aortic Aneurysm (AAA).    Outpatient Encounter Medications as of 5/18/2023   Medication Sig Dispense Refill   • Citicoline (COGNITIVE HEALTH PO) Take 300 mg by mouth Daily. Cognizin     • Continuous Blood Gluc  (FreeStyle Saeid 14 Day [place]) device 1 Device Take As Directed. 1 each 0   • Cyanocobalamin (B-12)  1000 MCG capsule Take  by mouth.     • ezetimibe (ZETIA) 10 MG tablet TAKE 1 TABLET DAILY 90 tablet 1   • Insulin Glargine (BASAGLAR KWIKPEN) 100 UNIT/ML injection pen INJECT 25 UNITS            SUBCUTANEOUSLY INTO THE    APPROPRIATE AREA AS        DIRECTED EVERY NIGHT 30 mL 1   • Insulin Pen Needle 32G X 4 MM misc 1 Units Daily. 100 each 11   • Lancets Misc. (Accu-Chek FastClix Lancet) kit Use to check blood sugar up to three times a day 1 kit 3   • lisinopril (PRINIVIL,ZESTRIL) 20 MG tablet TAKE 1 TABLET DAILY 90 tablet 3   • metFORMIN (GLUCOPHAGE) 1000 MG tablet TAKE 1 TABLET TWICE DAILY  WITH MEALS 180 tablet 1   • metoprolol succinate XL (TOPROL-XL) 50 MG 24 hr tablet Take 1 tablet by mouth Daily. 30 tablet 11   • montelukast (SINGULAIR) 10 MG tablet TAKE 1 TABLET DAILY 90 tablet 1   • multivitamin with minerals tablet tablet Take 1 tablet by mouth Daily.     • omeprazole (priLOSEC) 20 MG capsule TAKE 1 CAPSULE DAILY 90 capsule 1   • OneTouch Verio test strip Use to check blood sugar up to three times a day 100 each 3   • Semaglutide,0.25 or 0.5MG/DOS, (Ozempic, 0.25 or 0.5 MG/DOSE,) 2 MG/1.5ML solution pen-injector Inject 0.25 mg under the skin into the appropriate area as directed 1 (One) Time Per Week. 1.5 mL 0   • [DISCONTINUED] amLODIPine (NORVASC) 5 MG tablet TAKE 1 TABLET DAILY 90 tablet 1   • [DISCONTINUED] aspirin 81 MG EC tablet Take 81 mg by mouth Daily.     • [DISCONTINUED] Continuous Blood Gluc Sensor (FreeStyle Saeid 2 Sensor) misc AS DIRECTED CHANGE  EVERY  14  DAYS 2 each 0     No facility-administered encounter medications on file as of 5/18/2023.       Reviewed use of high risk medication in the elderly: yes  Reviewed for potential of harmful drug interactions in the elderly: yes    Follow Up:  Return in about 4 weeks (around 6/15/2023) for Recheck.     Future Appointments       Provider Department Center    6/15/2023 9:30 AM Sav Mulligan MD [facility] INTERNAL MEDICINE  [facility]    8/9/2023 10:45 AM Melchor Waters MD [facility] CARDIOLOGY MAIN CAMPUS [facility]    5/20/2024 8:00 AM Sav Mulligan MD [facility] INTERNAL MEDICINE [facility]            There are no Patient Instructions on file for this visit.    An After Visit Summary and PPPS with all of these plans were given to the patient.      Sav Mulligan MD

## 2023-05-19 ENCOUNTER — PATIENT MESSAGE (OUTPATIENT)
Dept: INTERNAL MEDICINE | Facility: CLINIC | Age: 68
End: 2023-05-19
Payer: MEDICARE

## 2023-05-19 DIAGNOSIS — Z79.4 TYPE 2 DIABETES MELLITUS WITH MICROALBUMINURIA, WITH LONG-TERM CURRENT USE OF INSULIN: Primary | Chronic | ICD-10-CM

## 2023-05-19 DIAGNOSIS — E11.29 TYPE 2 DIABETES MELLITUS WITH MICROALBUMINURIA, WITH LONG-TERM CURRENT USE OF INSULIN: Primary | Chronic | ICD-10-CM

## 2023-05-19 DIAGNOSIS — E11.9 TYPE 2 DIABETES MELLITUS WITHOUT COMPLICATION, WITH LONG-TERM CURRENT USE OF INSULIN: ICD-10-CM

## 2023-05-19 DIAGNOSIS — R80.9 TYPE 2 DIABETES MELLITUS WITH MICROALBUMINURIA, WITH LONG-TERM CURRENT USE OF INSULIN: Primary | Chronic | ICD-10-CM

## 2023-05-19 DIAGNOSIS — Z79.4 TYPE 2 DIABETES MELLITUS WITHOUT COMPLICATION, WITH LONG-TERM CURRENT USE OF INSULIN: ICD-10-CM

## 2023-05-19 RX ORDER — BLOOD-GLUCOSE SENSOR
1 EACH MISCELLANEOUS
Qty: 5 EACH | Refills: 3 | Status: SHIPPED | OUTPATIENT
Start: 2023-05-19

## 2023-05-19 NOTE — TELEPHONE ENCOUNTER
The original prescription was discontinued on 5/18/2023 by Sav Mulligan MD. Renewing this prescription may not be appropriate.

## 2023-06-13 RX ORDER — EZETIMIBE 10 MG/1
10 TABLET ORAL DAILY
Qty: 90 TABLET | Refills: 1 | Status: SHIPPED | OUTPATIENT
Start: 2023-06-13

## 2023-06-15 ENCOUNTER — OFFICE VISIT (OUTPATIENT)
Dept: INTERNAL MEDICINE | Facility: CLINIC | Age: 68
End: 2023-06-15
Payer: MEDICARE

## 2023-06-15 VITALS
HEART RATE: 68 BPM | SYSTOLIC BLOOD PRESSURE: 120 MMHG | DIASTOLIC BLOOD PRESSURE: 70 MMHG | TEMPERATURE: 98 F | BODY MASS INDEX: 29.26 KG/M2 | WEIGHT: 216 LBS | OXYGEN SATURATION: 96 % | HEIGHT: 72 IN

## 2023-06-15 DIAGNOSIS — R80.9 TYPE 2 DIABETES MELLITUS WITH MICROALBUMINURIA, WITH LONG-TERM CURRENT USE OF INSULIN: Primary | Chronic | ICD-10-CM

## 2023-06-15 DIAGNOSIS — E11.29 TYPE 2 DIABETES MELLITUS WITH MICROALBUMINURIA, WITH LONG-TERM CURRENT USE OF INSULIN: Primary | Chronic | ICD-10-CM

## 2023-06-15 DIAGNOSIS — Z79.4 TYPE 2 DIABETES MELLITUS WITH MICROALBUMINURIA, WITH LONG-TERM CURRENT USE OF INSULIN: Primary | Chronic | ICD-10-CM

## 2023-06-15 DIAGNOSIS — E66.3 OVERWEIGHT (BMI 25.0-29.9): ICD-10-CM

## 2023-06-15 RX ORDER — SEMAGLUTIDE 1.34 MG/ML
0.5 INJECTION, SOLUTION SUBCUTANEOUS WEEKLY
Qty: 1.5 ML | Refills: 0 | Status: SHIPPED | OUTPATIENT
Start: 2023-06-15

## 2023-06-15 NOTE — PROGRESS NOTES
"Chief Complaint  Rohan Evans Jr. is a 67 y.o. male presenting for Diabetes.     Retired  with the [employer]. . 2 children daughter born 1979 and son born 1981.     Patient has a past medical history of T2DM without complications, hypertension, hyperlipidemia, PVCs, non-sustained VT (longest 8 beats, Holter 2/3/23), systolic murmur (TTE 2/3/23 EF 61%, mild AS, mild AR, mild TR, est cards Dr. Waters), GERD and allergic rhinitis during spring/summer.    History of Present Illness  Patient is here for diabetic follow-up after starting on Ozempic.    The first week he had diarrhea, gas and bloating, it got better through the second and third week, and over the last week he has been doing well.  He has lost a total of 8 pounds, so about 2 pounds weekly.  His blood sugars over the last week has averaged at 130 on his freestyle maria fernanda 3.  Today's blood sugar was 124.  The lowest blood sugar has been 93, he was not having symptoms at the time.    The following portions of the patient's history were reviewed and updated as appropriate: allergies, current medications, past family history, past medical history, past social history, past surgical history and problem list.    Objective  /70 (BP Location: Left arm, Patient Position: Sitting, Cuff Size: Adult)   Pulse 68   Temp 98 °F (36.7 °C) (Temporal)   Ht 182 cm (71.65\")   Wt 98 kg (216 lb)   SpO2 96%   BMI 29.58 kg/m²     Physical Exam  Constitutional:       Appearance: Normal appearance.   HENT:      Head: Normocephalic and atraumatic.   Eyes:      Extraocular Movements: Extraocular movements intact.      Conjunctiva/sclera: Conjunctivae normal.   Musculoskeletal:      Cervical back: Neck supple.   Neurological:      Mental Status: He is alert and oriented to person, place, and time. Mental status is at baseline.   Psychiatric:         Behavior: Behavior normal.         Thought Content: Thought content normal. "         Assessment/Plan   1. Type 2 diabetes mellitus with microalbuminuria, with long-term current use of insulin  Hemoglobin A1C   Date Value Ref Range Status   05/18/2023 8.3 % Final   12/21/2022 6.8 % Final   08/02/2022 6.8 % Final   02/04/2021 7.10 (H) 4.80 - 5.60 % Final   His diabetes is not well controlled, we will try to get his fasting blood sugar down to around 100-110.  If he develops blood sugars dropping below 80 we might have to reduce his insulin.  There has been supply issues and back orders on Ozempic, hopefully he will be able to get refill.  We will increase the dose of Ozempic from 0.25 mg to 0.5 mg.  Follow-up in mid August, which would be time for repeat A1c.  Patient did have an eye exam at the end of last year, but did not do the diabetic exam at that time.  He will likely do eye exam later this year.  - Semaglutide,0.25 or 0.5MG/DOS, (Ozempic, 0.25 or 0.5 MG/DOSE,) 2 MG/1.5ML solution pen-injector; Inject 0.5 mg under the skin into the appropriate area as directed 1 (One) Time Per Week.  Dispense: 1.5 mL; Refill: 0    2. Overweight (BMI 25.0-29.9)     He has dropped from obese category to overweight category, lost total of 8 pounds.  We will continue to monitor.      Return in about 9 weeks (around 8/17/2023) for Recheck.    Future Appointments       Provider Department Center    7/5/2023 9:00 AM [facility] [address] ULTRASOUND AT [facility]    8/9/2023 10:45 AM Melchor Waters MD [facility] CARDIOLOGY MAIN CAMPUS [facility]    8/15/2023 9:00 AM Sav Mulligan MD [facility] INTERNAL MEDICINE [facility]    5/20/2024 8:00 AM Sav Mulligan MD [facility] INTERNAL MEDICINE [facility]            Sav Mulligan MD  Family Medicine  06/15/2023

## 2023-07-10 PROBLEM — I77.89 ENLARGEMENT OF ABDOMINAL AORTA: Status: ACTIVE | Noted: 2023-07-10

## 2023-08-08 DIAGNOSIS — I10 ESSENTIAL HYPERTENSION: ICD-10-CM

## 2023-08-09 ENCOUNTER — OFFICE VISIT (OUTPATIENT)
Dept: CARDIOLOGY | Facility: CLINIC | Age: 68
End: 2023-08-09
Payer: MEDICARE

## 2023-08-09 VITALS
WEIGHT: 208 LBS | DIASTOLIC BLOOD PRESSURE: 80 MMHG | BODY MASS INDEX: 28.17 KG/M2 | HEIGHT: 72 IN | HEART RATE: 68 BPM | OXYGEN SATURATION: 96 % | SYSTOLIC BLOOD PRESSURE: 120 MMHG

## 2023-08-09 DIAGNOSIS — I49.3 PVC'S (PREMATURE VENTRICULAR CONTRACTIONS): Chronic | ICD-10-CM

## 2023-08-09 DIAGNOSIS — R07.9 CHEST PAIN, UNSPECIFIED TYPE: Primary | ICD-10-CM

## 2023-08-09 DIAGNOSIS — E78.2 MIXED HYPERLIPIDEMIA: Chronic | ICD-10-CM

## 2023-08-09 DIAGNOSIS — I10 ESSENTIAL HYPERTENSION: Chronic | ICD-10-CM

## 2023-08-09 PROCEDURE — 3079F DIAST BP 80-89 MM HG: CPT | Performed by: INTERNAL MEDICINE

## 2023-08-09 PROCEDURE — 99214 OFFICE O/P EST MOD 30 MIN: CPT | Performed by: INTERNAL MEDICINE

## 2023-08-09 PROCEDURE — 3074F SYST BP LT 130 MM HG: CPT | Performed by: INTERNAL MEDICINE

## 2023-08-09 RX ORDER — ROSUVASTATIN CALCIUM 5 MG/1
5 TABLET, COATED ORAL DAILY
Qty: 30 TABLET | Refills: 11 | Status: SHIPPED | OUTPATIENT
Start: 2023-08-09

## 2023-08-09 RX ORDER — ROSUVASTATIN CALCIUM 5 MG/1
5 TABLET, COATED ORAL DAILY
Qty: 30 TABLET | Refills: 11 | Status: SHIPPED | OUTPATIENT
Start: 2023-08-09 | End: 2023-08-09 | Stop reason: SDUPTHER

## 2023-08-09 RX ORDER — LISINOPRIL 20 MG/1
TABLET ORAL
Qty: 90 TABLET | Refills: 3 | Status: SHIPPED | OUTPATIENT
Start: 2023-08-09

## 2023-08-09 NOTE — PROGRESS NOTES
[facility] CARDIOLOGY    Return Patient Office Visit    Patient Name: Rohan Evans Jr.  : 1955   MRN: [id number]   Care Team: Patient Care Team:  Sav Mulligan MD as PCP - General (Family Medicine)  Melchor Waters MD as Cardiologist (Cardiology)  Flores Larkin PA-C as Physician Assistant (Physician Assistant)  Sav Mulligan MD as Consulting Physician (Family Medicine)    Chief Complaint   Patient presents with    pvc     HPI: Rohan Evans Jr. is a 68 y.o. male with a history of diabetes on insulin, hypertension, hyperlipidemia, GERD, seasonal allergies who presents today for routine follow-up of frequent non-sustained VT and mild AS.  He was last seen by me in February of this year and since then echocardiography showed normal LV function with mild aortic stenosis.  Overall, he reports feeling well but with more strenuous activities like cutting the grass he will occasionally have chest tightness (not consistent with every time.  He denies any palpitations, dizziness, syncope or presyncope.  In the past, he is tried Lipitor and Crestor with reported muscle aches and possible LFT elevation.    Subjective   Review of Systems   Constitutional:  Negative for activity change.   Respiratory:  Positive for chest tightness. Negative for shortness of breath.    Cardiovascular:  Negative for chest pain and palpitations.     Past Medical History:   Diagnosis Date    Colon polyp     Drug-induced erectile dysfunction 2021    Increase lisinopril 20->40 mg    Elevated liver enzymes 2021    Essential hypertension     GERD (gastroesophageal reflux disease)     Heart murmur     History of cardiac murmur     Since young age. Done TTE and stress tests. Normal per paitient    Mixed hyperlipidemia     PVC's (premature ventricular contractions) 2023    Seasonal allergic rhinitis     Systolic murmur     Since young age per paitient. TTE 2/3/23 EF 61%,  mild AS, mild AR, mild TR,    Type 2 diabetes mellitus without complication, without long-term current use of insulin 2010     Tobacco Use: Medium Risk    Smoking Tobacco Use: Former    Smokeless Tobacco Use: Never    Passive Exposure: Not on file     Allergies   Allergen Reactions    Penicillins Swelling     High temp and red blotches    Lipitor [Atorvastatin] Other (See Comments)     Either muscle or liver lab abnormality        Current Outpatient Medications:     Citicoline (COGNITIVE HEALTH PO), Take 300 mg by mouth Daily. Cognizin, Disp: , Rfl:     Continuous Blood Gluc  (FreeStyle Saeid 14 Day [place]) device, 1 Device Take As Directed., Disp: 1 each, Rfl: 0    Continuous Blood Gluc Sensor (FreeStyle Saeid 3 Sensor) misc, 1 Units Every 14 (Fourteen) Days., Disp: 5 each, Rfl: 3    Cyanocobalamin (B-12) 1000 MCG capsule, Take  by mouth., Disp: , Rfl:     ezetimibe (ZETIA) 10 MG tablet, Take 1 tablet by mouth Daily., Disp: 90 tablet, Rfl: 1    Insulin Glargine (BASAGLAR KWIKPEN) 100 UNIT/ML injection pen, INJECT 25 UNITS            SUBCUTANEOUSLY INTO THE    APPROPRIATE AREA AS        DIRECTED EVERY NIGHT, Disp: 30 mL, Rfl: 1    Insulin Pen Needle 32G X 4 MM misc, 1 Units Daily., Disp: 100 each, Rfl: 11    Lancets Misc. (Accu-Chek FastClix Lancet) kit, Use to check blood sugar up to three times a day, Disp: 1 kit, Rfl: 3    lisinopril (PRINIVIL,ZESTRIL) 20 MG tablet, TAKE 1 TABLET DAILY, Disp: 90 tablet, Rfl: 3    metFORMIN (GLUCOPHAGE) 1000 MG tablet, TAKE 1 TABLET TWICE DAILY  WITH MEALS, Disp: 180 tablet, Rfl: 1    metoprolol succinate XL (TOPROL-XL) 50 MG 24 hr tablet, Take 1 tablet by mouth Daily., Disp: 30 tablet, Rfl: 11    montelukast (SINGULAIR) 10 MG tablet, Take 1 tablet by mouth Daily., Disp: 90 tablet, Rfl: 1    multivitamin with minerals tablet tablet, Take 1 tablet by mouth Daily., Disp: , Rfl:     omeprazole (priLOSEC) 20 MG capsule, TAKE 1 CAPSULE DAILY, Disp: 90 capsule, Rfl: 1     "OneTouch Verio test strip, Use to check blood sugar up to three times a day, Disp: 100 each, Rfl: 3    Semaglutide,0.25 or 0.5MG/DOS, (Ozempic, 0.25 or 0.5 MG/DOSE,) 2 MG/3ML solution pen-injector, INJECT 0.5 MG UNDER THE SKIN INTO THE APPROPRIATE AREA AS DIRECTED 1 (ONE) TIME PER WEEK., Disp: 3 mL, Rfl: 3    Objective     Vitals:    08/09/23 1043   BP: 120/80   BP Location: Left arm   Patient Position: Sitting   Pulse: 68   SpO2: 96%   Weight: 94.3 kg (208 lb)   Height: 182 cm (71.65\")     Body mass index is 28.48 kg/mý.  Gen: well developed, lying in bed, comfortable appearing  HEENT: MMM, sclera anicteric, conjunctiva normal  CV: regular rate, regular rhythm, III/VI MILKA at RUSB with radiation to carotids, normal S1, S2. 2+ radial and DP pulses  Pulm: RA, normal work of breathing, no wheezes, rales, rhonchi  Abd: soft, non-tender, non-distended  Ext: normal bulk for age, normal tone, no dependent edema  Neuro: alert, oriented, face symmetrical, moving all extremities well  Psych: normal mood, appropriate affect     Most recent PCP note, imaging tests, and labs reviewed.    Labs:    Lab Results   Component Value Date    GLUCOSE 131 (H) 05/18/2023    BUN 21 05/18/2023    CREATININE 1.03 05/18/2023    EGFRIFNONA 79 09/07/2021    BCR 20.4 05/18/2023    K 4.1 05/18/2023    CO2 27.0 05/18/2023    CALCIUM 9.7 05/18/2023    ALBUMIN 4.8 05/18/2023    AST 33 05/18/2023    ALT 34 05/18/2023     Lab Results   Component Value Date    HGBA1C 8.3 05/18/2023     Lab Results   Component Value Date    CHOL 197 05/18/2023    TRIG 95 05/18/2023    HDL 53 05/18/2023     (H) 05/18/2023     Exercise stress echocardiogram March 22, 2017  1.  Normal exercise stress echocardiogram.  LVEF at peak exercise greater than 65%  2.  Normal resting 2D echocardiogram, LVEF 60%  3.  No obvious evidence of ischemia noted  4.  Hypertensive response noted with exercise    EKG December 5, 2018  Sinus bradycardia heart rate 54, 1 " PVC    Echocardiogram January 2019  1. left ventricle dimensions are normal, LVH with basal septal thickening.  Wall motion normal, LVEF 68%  2.  Grade 1 diastolic dysfunction suggestive of impaired relaxation  3.  Mild aortic stenosis with mild aortic regurgitation.  Mean gradient 17 mmHg, peak velocity 2.8 m/s, MICHELET 1.4 cmý  4.  Mild tricuspid regurgitation, RVSP 31 mmHg    2/23/23 - Transthoracic Echo Complete    Left ventricular systolic function is normal. Calculated left ventricular EF = 61.2% Normal left ventricular cavity size and wall thickness noted. All left ventricular wall segments contract normally. Left ventricular diastolic function is consistent with (grade I) impaired relaxation.    Normal right ventricular cavity size, wall thickness and systolic function    Is moderate calcification of the aortic valve with moderate restriction of valve opening.    Mild aortic valve regurgitation is present. Mild aortic valve stenosis is present.    Ao mean PG 14.2 mmHg    Ao pk helio 255.4 cm/sec    MICHELET(I,D) 1.8 cm2    1/25 - 1/27/23 - ZioXT Wearable Heart Monitor  An abnormal monitor study. Frequent episodes (400) of nonsustained VT, longest 8 beats  Patient trigger associated with VT episode     Procedures    Advance Care Planning   ACP discussion was held with the patient during this visit. Patient does not have an advance directive, information provided.       Assessment & Plan     No diagnosis found.    History of mild AS, mild AR, mild TR   - Recheck echocardiogram, last was 3 years ago   - Severity of his valvular disease which does not appear severe on exam determine his echocardiographic follow-up interval  PVCs   - none on ECG in the office today   - We will check 48-hour monitor for better quantification of his ectopy burden    Frequent Non-sustained VT episodes   - Asymptomatic, continue beta-blocker   - With some of his chest tightness will check stress test to evaluate for coronary disease with  potential etiology    Primary prevention of ASCVD  The 10-year ASCVD risk score (Everardo HERR, et al., 2019) is: 32.2%    Values used to calculate the score:      Age: 68 years      Sex: Male      Is Non- : No      Diabetic: Yes      Tobacco smoker: No      Systolic Blood Pressure: 120 mmHg      Is BP treated: Yes      HDL Cholesterol: 53 mg/dL      Total Cholesterol: 197 mg/dL   - Elevated 10-year risk score, will retrial statin with low-dose rosuvastatin and monitor for any symptoms or LFT changes    Hypertension   - Well-controlled today, continue current regimen    No follow-ups on file.    VALENTE Waters MD, MS  08/09/23    [facility] Cardiology  [address] [address] [phone]

## 2023-08-15 ENCOUNTER — OFFICE VISIT (OUTPATIENT)
Dept: INTERNAL MEDICINE | Facility: CLINIC | Age: 68
End: 2023-08-15
Payer: MEDICARE

## 2023-08-15 VITALS
HEIGHT: 72 IN | TEMPERATURE: 97.8 F | SYSTOLIC BLOOD PRESSURE: 146 MMHG | DIASTOLIC BLOOD PRESSURE: 88 MMHG | BODY MASS INDEX: 28.71 KG/M2 | HEART RATE: 56 BPM | WEIGHT: 212 LBS

## 2023-08-15 DIAGNOSIS — Z79.4 TYPE 2 DIABETES MELLITUS WITH MICROALBUMINURIA, WITH LONG-TERM CURRENT USE OF INSULIN: Primary | Chronic | ICD-10-CM

## 2023-08-15 DIAGNOSIS — E78.2 MIXED HYPERLIPIDEMIA: Chronic | ICD-10-CM

## 2023-08-15 DIAGNOSIS — I10 ESSENTIAL HYPERTENSION: Chronic | ICD-10-CM

## 2023-08-15 DIAGNOSIS — E11.29 TYPE 2 DIABETES MELLITUS WITH MICROALBUMINURIA, WITH LONG-TERM CURRENT USE OF INSULIN: Primary | Chronic | ICD-10-CM

## 2023-08-15 DIAGNOSIS — R80.9 TYPE 2 DIABETES MELLITUS WITH MICROALBUMINURIA, WITH LONG-TERM CURRENT USE OF INSULIN: Primary | Chronic | ICD-10-CM

## 2023-08-15 LAB
EXPIRATION DATE: NORMAL
HBA1C MFR BLD: 6.5 %
Lab: NORMAL

## 2023-08-15 RX ORDER — SEMAGLUTIDE 0.68 MG/ML
0.25 INJECTION, SOLUTION SUBCUTANEOUS
Qty: 3 ML | Refills: 3 | COMMUNITY
Start: 2023-08-15

## 2023-08-15 NOTE — PROGRESS NOTES
"Chief Complaint  Rohan Evans Jr. is a 68 y.o. male presenting for Diabetes.     Retired  with the [employer]. . 2 children daughter born 1979 and son born 1981.     Patient has a past medical history of T2DM without complications, hypertension, hyperlipidemia, PVCs, non-sustained VT (longest 8 beats, Holter 2/3/23), systolic murmur (TTE 2/3/23 EF 61%, mild AS, mild AR, mild TR, est cards Dr. Waters), GERD and allergic rhinitis during spring/summer.    History of Present Illness  Patient is here for follow-up of his diabetes.    He did increase his Ozempic from 0.25mg to 0.5 mg, and was able to take a few doses, but he developed diarrhea and vomiting, and stopped the medication for the last 1.5 weeks.  He is doing better now, but was feeling that the dose was too high for him.    He did see cardiology and they added low-dose Crestor at 5 mg, which she has tolerated.  He had issues in the past with side effects, myalgias.  He is scheduled for stress test in September.    Patient does keep an eye on his blood pressures at home, mostly SBP in the 120s to low 130s.  Diastolic BP mostly in the 80s.    The following portions of the patient's history were reviewed and updated as appropriate: allergies, current medications, past family history, past medical history, past social history, past surgical history, and problem list.    Objective  /88 (BP Location: Right arm, Patient Position: Sitting, Cuff Size: Large Adult)   Pulse 56   Temp 97.8 øF (36.6 øC) (Temporal)   Ht 182 cm (71.65\")   Wt 96.2 kg (212 lb)   BMI 29.03 kg/mý     Physical Exam  Constitutional:       Appearance: Normal appearance.   HENT:      Head: Normocephalic and atraumatic.   Eyes:      Extraocular Movements: Extraocular movements intact.      Conjunctiva/sclera: Conjunctivae normal.   Pulmonary:      Effort: Pulmonary effort is normal. No respiratory distress.   Musculoskeletal:      Cervical back: Neck supple. "   Neurological:      Mental Status: He is alert and oriented to person, place, and time. Mental status is at baseline.   Psychiatric:         Behavior: Behavior normal.         Thought Content: Thought content normal.       Assessment/Plan   1. Type 2 diabetes mellitus with microalbuminuria, with long-term current use of insulin  Hemoglobin A1C   Date Value Ref Range Status   08/15/2023 6.5 % Final   05/18/2023 8.3 % Final   12/21/2022 6.8 % Final   02/04/2021 7.10 (H) 4.80 - 5.60 % Final   Currently well controlled diabetes after adding Ozempic.  Unfortunately he is not tolerating that 0.5 mg dose, so we will reduce the dose to 0.25 mg.  He has been able to lose a total of 12 pounds since June.  Patient is due for eye exam, he has not been this year.  I have made him aware, his last visit was May 2022.  - POC Glycosylated Hemoglobin (Hb A1C)  - Semaglutide,0.25 or 0.5MG/DOS, (Ozempic, 0.25 or 0.5 MG/DOSE,) 2 MG/3ML solution pen-injector; Inject 0.25 mg under the skin into the appropriate area as directed Every 7 (Seven) Days.  Dispense: 3 mL; Refill: 3  - Hemoglobin A1c; Future    2. Essential hypertension  BP Readings from Last 3 Encounters:   08/15/23 146/88   08/09/23 120/80   06/15/23 120/70   Blood pressure elevated today, home blood pressures fairly reassuring.  We will continue to monitor, patient will check his blood pressures at home.  If he continues to trend high I would increase his medications.  - Comprehensive Metabolic Panel; Future    3. Mixed hyperlipidemia  The 10-year ASCVD risk score (Everardo HERR, et al., 2019) is: 42.4%    Values used to calculate the score:      Age: 68 years      Sex: Male      Is Non- : No      Diabetic: Yes      Tobacco smoker: No      Systolic Blood Pressure: 146 mmHg      Is BP treated: Yes      HDL Cholesterol: 53 mg/dL      Total Cholesterol: 197 mg/dL  Patient is tolerating the Crestor.  We will do repeat blood work fasting before next visit.  -  Lipid Panel; Future     Return in about 13 weeks (around 11/14/2023) for Recheck.    Future Appointments         Provider Department Center    9/29/2023 7:45 AM [facility] ADMIN [facility] CARDIOVASCULAR LAB [place]    11/15/2023 9:15 AM Sav Mulligan MD [facility] INTERNAL MEDICINE [place]    5/20/2024 8:00 AM Sav Mulligan MD [facility] INTERNAL MEDICINE [place]    8/7/2024 1:30 PM Melchor Waters MD [facility] CARDIOLOGY [place]              Sav Mulligan MD  Family Medicine  08/15/2023

## 2023-08-24 ENCOUNTER — PATIENT MESSAGE (OUTPATIENT)
Dept: INTERNAL MEDICINE | Facility: CLINIC | Age: 68
End: 2023-08-24
Payer: MEDICARE

## 2023-08-24 DIAGNOSIS — R80.9 TYPE 2 DIABETES MELLITUS WITH MICROALBUMINURIA, WITH LONG-TERM CURRENT USE OF INSULIN: Primary | Chronic | ICD-10-CM

## 2023-08-24 DIAGNOSIS — Z79.4 TYPE 2 DIABETES MELLITUS WITH MICROALBUMINURIA, WITH LONG-TERM CURRENT USE OF INSULIN: Primary | Chronic | ICD-10-CM

## 2023-08-24 DIAGNOSIS — E11.29 TYPE 2 DIABETES MELLITUS WITH MICROALBUMINURIA, WITH LONG-TERM CURRENT USE OF INSULIN: Primary | Chronic | ICD-10-CM

## 2023-08-24 NOTE — TELEPHONE ENCOUNTER
From: Rohan Evans Jr.  To: Sav Mulligan  Sent: 8/24/2023 10:18 AM EDT  Subject: Switching from Ozempic to Rybelsus    Good morning Dr. Mulligan,  I wanted to get your thoughts on changing from the Ozempic injection to the Rybelsus oral semaglutide? I have been doing a little research about the differences and I would like to try the minimum dose which I believe is comparable to what we are using with the pen.   I would like to hear your thoughts and if it's possible maybe get a prescription to try the Rybelsus. Thanks for your time to consider and reply.   Rohan Evans

## 2023-08-28 ENCOUNTER — TELEPHONE (OUTPATIENT)
Dept: INTERNAL MEDICINE | Facility: CLINIC | Age: 68
End: 2023-08-28
Payer: MEDICARE

## 2023-08-28 NOTE — TELEPHONE ENCOUNTER
Patient verbalized understanding. He stated that he will discuss this with you at his next appointment.

## 2023-08-28 NOTE — TELEPHONE ENCOUNTER
Pharmacy is requesting an alternative to the Rybelsus due to the Co-pay being $241 they are not sure if that is acceptable.  Left voicemail for patient to return call.

## 2023-08-28 NOTE — TELEPHONE ENCOUNTER
Change from Ozempic to Rybelsus, because this is the same medication.  However to change to something different we have to discuss in the office.  If you want to continue Ozempic I can refill that without seeing him again in the office

## 2023-08-28 NOTE — TELEPHONE ENCOUNTER
I spoke to patient asked if he wanted to try an alternative to the Rybelsus or go back to what he was on. He wanted to know if you know of an alternative oral medication.

## 2023-09-17 DIAGNOSIS — K21.9 GASTROESOPHAGEAL REFLUX DISEASE WITHOUT ESOPHAGITIS: ICD-10-CM

## 2023-09-18 RX ORDER — OMEPRAZOLE 20 MG/1
CAPSULE, DELAYED RELEASE ORAL
Qty: 90 CAPSULE | Refills: 1 | Status: SHIPPED | OUTPATIENT
Start: 2023-09-18

## 2023-09-29 ENCOUNTER — HOSPITAL ENCOUNTER (OUTPATIENT)
Dept: CARDIOLOGY | Facility: HOSPITAL | Age: 68
Discharge: HOME | End: 2023-09-29
Payer: MEDICARE

## 2023-09-29 ENCOUNTER — TELEPHONE (OUTPATIENT)
Dept: CARDIOLOGY | Facility: CLINIC | Age: 68
End: 2023-09-29

## 2023-09-29 VITALS
DIASTOLIC BLOOD PRESSURE: 91 MMHG | HEART RATE: 54 BPM | HEIGHT: 72 IN | SYSTOLIC BLOOD PRESSURE: 138 MMHG | BODY MASS INDEX: 28.71 KG/M2 | WEIGHT: 212 LBS | OXYGEN SATURATION: 98 %

## 2023-09-29 DIAGNOSIS — R07.9 CHEST PAIN, UNSPECIFIED TYPE: ICD-10-CM

## 2023-09-29 LAB
BH CV REST NUCLEAR ISOTOPE DOSE: 9.9 MCI
BH CV STRESS BP STAGE 1: NORMAL
BH CV STRESS BP STAGE 2: NORMAL
BH CV STRESS DURATION MIN STAGE 1: 3
BH CV STRESS DURATION MIN STAGE 2: 3
BH CV STRESS DURATION MIN STAGE 3: 1
BH CV STRESS DURATION SEC STAGE 1: 0
BH CV STRESS DURATION SEC STAGE 2: 0
BH CV STRESS DURATION SEC STAGE 3: 40
BH CV STRESS GRADE STAGE 1: 10
BH CV STRESS GRADE STAGE 2: 12
BH CV STRESS GRADE STAGE 3: 14
BH CV STRESS HR STAGE 1: 96
BH CV STRESS HR STAGE 2: 112
BH CV STRESS HR STAGE 3: 134
BH CV STRESS METS STAGE 1: 5
BH CV STRESS METS STAGE 2: 7.5
BH CV STRESS METS STAGE 3: 10
BH CV STRESS NUCLEAR ISOTOPE DOSE: 33 MCI
BH CV STRESS O2 STAGE 1: 97
BH CV STRESS O2 STAGE 2: 97
BH CV STRESS O2 STAGE 3: 96
BH CV STRESS PROTOCOL 1: NORMAL
BH CV STRESS RECOVERY BP: NORMAL MMHG
BH CV STRESS RECOVERY HR: 71 BPM
BH CV STRESS RECOVERY O2: 98 %
BH CV STRESS SPEED STAGE 1: 1.7
BH CV STRESS SPEED STAGE 2: 2.5
BH CV STRESS SPEED STAGE 3: 3.4
BH CV STRESS STAGE 1: 1
BH CV STRESS STAGE 2: 2
BH CV STRESS STAGE 3: 3
LV EF NUC BP: 66 %
MAXIMAL PREDICTED HEART RATE: 150 BPM
PERCENT MAX PREDICTED HR: 90.67 %
STRESS BASELINE BP: NORMAL MMHG
STRESS BASELINE HR: 56 BPM
STRESS O2 SAT REST: 98 %
STRESS PERCENT HR: 107 %
STRESS POST ESTIMATED WORKLOAD: 9.5 METS
STRESS POST EXERCISE DUR MIN: 7 MIN
STRESS POST EXERCISE DUR SEC: 40 SEC
STRESS POST O2 SAT PEAK: 96 %
STRESS POST PEAK BP: NORMAL MMHG
STRESS POST PEAK HR: 136 BPM
STRESS TARGET HR: 128 BPM

## 2023-09-29 PROCEDURE — A9500 TC99M SESTAMIBI: HCPCS | Performed by: INTERNAL MEDICINE

## 2023-09-29 PROCEDURE — 0 TECHNETIUM SESTAMIBI: Performed by: INTERNAL MEDICINE

## 2023-09-29 PROCEDURE — 78452 HT MUSCLE IMAGE SPECT MULT: CPT

## 2023-09-29 PROCEDURE — 93017 CV STRESS TEST TRACING ONLY: CPT

## 2023-09-29 RX ORDER — METOPROLOL SUCCINATE 50 MG/1
75 TABLET, EXTENDED RELEASE ORAL DAILY
Qty: 45 TABLET | Refills: 3 | Status: SHIPPED | OUTPATIENT
Start: 2023-09-29

## 2023-09-29 RX ADMIN — TECHNETIUM TC 99M SESTAMIBI 1 DOSE: 1 INJECTION INTRAVENOUS at 09:30

## 2023-09-29 RX ADMIN — TECHNETIUM TC 99M SESTAMIBI 1 DOSE: 1 INJECTION INTRAVENOUS at 07:50

## 2023-09-29 NOTE — TELEPHONE ENCOUNTER
Melchor Waters MD Jolly, Hunter A RN  Can you please call the patient to let them know about their stress test results. The nuclear pictures which follow the blood flow were normal which is reassuring but there were changes with the EKG and I did see some calcification in his heart arteries. We can try increasing his metoprolol to 75mg daily and see if that helps some of his symptoms. If we adjust his medications and he's still having symptoms the next thing would be a heart catheterization      Discussed results with patient. Agreeable to plan. No further questions.

## 2023-11-02 DIAGNOSIS — Z79.4 TYPE 2 DIABETES MELLITUS WITHOUT COMPLICATION, WITH LONG-TERM CURRENT USE OF INSULIN: Chronic | ICD-10-CM

## 2023-11-02 DIAGNOSIS — E11.9 TYPE 2 DIABETES MELLITUS WITHOUT COMPLICATION, WITHOUT LONG-TERM CURRENT USE OF INSULIN: ICD-10-CM

## 2023-11-02 DIAGNOSIS — E11.9 TYPE 2 DIABETES MELLITUS WITHOUT COMPLICATION, WITH LONG-TERM CURRENT USE OF INSULIN: Chronic | ICD-10-CM

## 2023-11-02 RX ORDER — INSULIN GLARGINE 100 [IU]/ML
INJECTION, SOLUTION SUBCUTANEOUS
Qty: 30 ML | Refills: 1 | Status: SHIPPED | OUTPATIENT
Start: 2023-11-02

## 2023-11-15 ENCOUNTER — LAB (OUTPATIENT)
Dept: LAB | Facility: HOSPITAL | Age: 68
End: 2023-11-15
Payer: MEDICARE

## 2023-11-15 ENCOUNTER — OFFICE VISIT (OUTPATIENT)
Dept: INTERNAL MEDICINE | Facility: CLINIC | Age: 68
End: 2023-11-15
Payer: MEDICARE

## 2023-11-15 VITALS
HEIGHT: 72 IN | SYSTOLIC BLOOD PRESSURE: 130 MMHG | TEMPERATURE: 98 F | HEART RATE: 60 BPM | WEIGHT: 214 LBS | BODY MASS INDEX: 28.99 KG/M2 | DIASTOLIC BLOOD PRESSURE: 80 MMHG

## 2023-11-15 DIAGNOSIS — R80.9 TYPE 2 DIABETES MELLITUS WITH MICROALBUMINURIA, WITH LONG-TERM CURRENT USE OF INSULIN: Primary | Chronic | ICD-10-CM

## 2023-11-15 DIAGNOSIS — E11.29 TYPE 2 DIABETES MELLITUS WITH MICROALBUMINURIA, WITH LONG-TERM CURRENT USE OF INSULIN: Primary | Chronic | ICD-10-CM

## 2023-11-15 DIAGNOSIS — I10 ESSENTIAL HYPERTENSION: Chronic | ICD-10-CM

## 2023-11-15 DIAGNOSIS — E78.2 MIXED HYPERLIPIDEMIA: Chronic | ICD-10-CM

## 2023-11-15 DIAGNOSIS — Z79.4 TYPE 2 DIABETES MELLITUS WITH MICROALBUMINURIA, WITH LONG-TERM CURRENT USE OF INSULIN: Primary | Chronic | ICD-10-CM

## 2023-11-15 LAB
ALBUMIN SERPL-MCNC: 4.8 G/DL (ref 3.5–5.2)
ALBUMIN/GLOB SERPL: 1.8 G/DL
ALP SERPL-CCNC: 37 U/L (ref 39–117)
ALT SERPL W P-5'-P-CCNC: 24 U/L (ref 1–41)
ANION GAP SERPL CALCULATED.3IONS-SCNC: 8.1 MMOL/L (ref 5–15)
AST SERPL-CCNC: 29 U/L (ref 1–40)
BILIRUB SERPL-MCNC: 0.5 MG/DL (ref 0–1.2)
BUN SERPL-MCNC: 19 MG/DL (ref 8–23)
BUN/CREAT SERPL: 20.9 (ref 7–25)
CALCIUM SPEC-SCNC: 10.1 MG/DL (ref 8.6–10.5)
CHLORIDE SERPL-SCNC: 104 MMOL/L (ref 98–107)
CHOLEST SERPL-MCNC: 161 MG/DL (ref 0–200)
CO2 SERPL-SCNC: 28.9 MMOL/L (ref 22–29)
CREAT SERPL-MCNC: 0.91 MG/DL (ref 0.76–1.27)
EGFRCR SERPLBLD CKD-EPI 2021: 90.7 ML/MIN/1.73
EXPIRATION DATE: ABNORMAL
GLOBULIN UR ELPH-MCNC: 2.7 GM/DL
GLUCOSE SERPL-MCNC: 123 MG/DL (ref 65–99)
HBA1C MFR BLD: 7.3 % (ref 4.5–5.7)
HDLC SERPL-MCNC: 53 MG/DL (ref 40–60)
LDLC SERPL CALC-MCNC: 92 MG/DL (ref 0–100)
LDLC/HDLC SERPL: 1.72 {RATIO}
Lab: ABNORMAL
POTASSIUM SERPL-SCNC: 4.9 MMOL/L (ref 3.5–5.2)
PROT SERPL-MCNC: 7.5 G/DL (ref 6–8.5)
SODIUM SERPL-SCNC: 141 MMOL/L (ref 136–145)
TRIGL SERPL-MCNC: 84 MG/DL (ref 0–150)
VLDLC SERPL-MCNC: 16 MG/DL (ref 5–40)

## 2023-11-15 PROCEDURE — 80061 LIPID PANEL: CPT

## 2023-11-15 PROCEDURE — 80053 COMPREHEN METABOLIC PANEL: CPT

## 2023-11-15 RX ORDER — SEMAGLUTIDE 0.68 MG/ML
0.25 INJECTION, SOLUTION SUBCUTANEOUS WEEKLY
Qty: 3 ML | Refills: 3 | Status: SHIPPED | OUTPATIENT
Start: 2023-11-15

## 2023-11-15 RX ORDER — INSULIN GLARGINE 100 [IU]/ML
30 INJECTION, SOLUTION SUBCUTANEOUS NIGHTLY
Qty: 30 ML | Refills: 0 | Status: SHIPPED | OUTPATIENT
Start: 2023-11-15

## 2023-11-15 NOTE — PROGRESS NOTES
"Chief Complaint  Rohan Evans Jr. is a 68 y.o. male presenting for Diabetes.     Retired  with the [employer]. . 2 children daughter born 1979 and son born 1981.     Patient has a past medical history of T2DM without complications, hypertension, hyperlipidemia, PVCs, non-sustained VT (longest 8 beats, Holter 2/3/23), systolic murmur (TTE 2/3/23 EF 61%, mild AS, mild AR, mild TR, est cards Dr. Waters), GERD and allergic rhinitis during spring/summer.    History of Present Illness  Patient is here for follow-up of his diabetes.    He is overall doing well.  He did not tolerate Ozempic 0.5 mg, that he tried again, but he is able to tolerate 0.5 mg.  He would like to continue on this dose.    He tells me his fasting sugars in the morning typically range around 115.  Sometimes during the day the DCM will show blood sugars down to 80 when he is physically active, but he has not had any symptomatic hypoglycemic episodes.  He is currently on Basaglar 27 units.    The following portions of the patient's history were reviewed and updated as appropriate: allergies, current medications, past family history, past medical history, past social history, past surgical history, and problem list.    Objective  /80 (BP Location: Left arm, Patient Position: Sitting, Cuff Size: Large Adult)   Pulse 60   Temp 98 °F (36.7 °C) (Temporal)   Ht 182 cm (71.65\")   Wt 97.1 kg (214 lb)   BMI 29.30 kg/m²     Physical Exam  Constitutional:       Appearance: Normal appearance.   Eyes:      Extraocular Movements: Extraocular movements intact.      Conjunctiva/sclera: Conjunctivae normal.   Pulmonary:      Effort: Pulmonary effort is normal. No respiratory distress.   Musculoskeletal:      Cervical back: Neck supple.   Skin:     General: Skin is warm and dry.   Neurological:      Mental Status: He is alert and oriented to person, place, and time. Mental status is at baseline.      Gait: Gait normal. "   Psychiatric:         Behavior: Behavior normal.         Thought Content: Thought content normal.         Assessment/Plan   1. Type 2 diabetes mellitus with microalbuminuria, with long-term current use of insulin  Hemoglobin A1C   Date Value Ref Range Status   11/15/2023 7.3 (A) 4.5 - 5.7 % Final   08/15/2023 6.5 % Final   05/18/2023 8.3 % Final   02/04/2021 7.10 (H) 4.80 - 5.60 % Final   Not optimal glycemic control, goal is A1c below 7.  He is having side effects of higher dose of semaglutide, so we will keep him at 0.25 mg.  The tablets were not covered by his insurance.  We will increase the Basaglar from 27 units daily to 30 units daily, hopefully that we will get his diabetes under control.  He is due for eye exam and plans to see eye doctor coming up.  - POC Glycosylated Hemoglobin (Hb A1C)  - Insulin Glargine (BASAGLAR KWIKPEN) 100 UNIT/ML injection pen; Inject 30 Units under the skin into the appropriate area as directed Every Night. inject into the appropriate area  Dispense: 30 mL; Refill: 0  - Semaglutide,0.25 or 0.5MG/DOS, (Ozempic, 0.25 or 0.5 MG/DOSE,) 2 MG/3ML solution pen-injector; Inject 0.25 mg under the skin into the appropriate area as directed 1 (One) Time Per Week.  Dispense: 3 mL; Refill: 3  - Insulin Pen Needle 32G X 4 MM misc; Use 1 Units Daily.  Dispense: 100 each; Refill: 11    2. Essential hypertension  BP Readings from Last 3 Encounters:   11/15/23 130/80   09/29/23 138/91   08/15/23 146/88   Blood pressure fairly well controlled, not ideal.  Continue on current medications for now    3. Mixed hyperlipidemia  Patient is fasting for lipid check today.  He was started on Crestor.    Patient declines flu vaccine.  Counseled on recommendations for COVID booster and also the new RSV vaccine      Return in about 13 weeks (around 2/14/2024) for Recheck.    Future Appointments         Provider Department Center    2/20/2024 9:00 AM Sav Mulligan MD [facility] INTERNAL  MEDICINE [place]    5/20/2024 8:00 AM Sav Mulligan MD [facility] INTERNAL MEDICINE [place]    8/7/2024 1:30 PM Melchor Waters MD [facility] CARDIOLOGY [place]              Sav Mulligan MD  Family Medicine  11/15/2023

## 2023-12-04 ENCOUNTER — OFFICE VISIT (OUTPATIENT)
Dept: INTERNAL MEDICINE | Facility: CLINIC | Age: 68
End: 2023-12-04
Payer: MEDICARE

## 2023-12-04 VITALS
SYSTOLIC BLOOD PRESSURE: 136 MMHG | HEART RATE: 60 BPM | BODY MASS INDEX: 28.71 KG/M2 | DIASTOLIC BLOOD PRESSURE: 70 MMHG | TEMPERATURE: 98.6 F | HEIGHT: 72 IN | WEIGHT: 212 LBS

## 2023-12-04 DIAGNOSIS — J22 LOWER RESPIRATORY INFECTION: Primary | ICD-10-CM

## 2023-12-04 PROCEDURE — 99213 OFFICE O/P EST LOW 20 MIN: CPT | Performed by: STUDENT IN AN ORGANIZED HEALTH CARE EDUCATION/TRAINING PROGRAM

## 2023-12-04 PROCEDURE — 3051F HG A1C>EQUAL 7.0%<8.0%: CPT | Performed by: STUDENT IN AN ORGANIZED HEALTH CARE EDUCATION/TRAINING PROGRAM

## 2023-12-04 PROCEDURE — 1159F MED LIST DOCD IN RCRD: CPT | Performed by: STUDENT IN AN ORGANIZED HEALTH CARE EDUCATION/TRAINING PROGRAM

## 2023-12-04 PROCEDURE — 1160F RVW MEDS BY RX/DR IN RCRD: CPT | Performed by: STUDENT IN AN ORGANIZED HEALTH CARE EDUCATION/TRAINING PROGRAM

## 2023-12-04 PROCEDURE — 3075F SYST BP GE 130 - 139MM HG: CPT | Performed by: STUDENT IN AN ORGANIZED HEALTH CARE EDUCATION/TRAINING PROGRAM

## 2023-12-04 PROCEDURE — 3078F DIAST BP <80 MM HG: CPT | Performed by: STUDENT IN AN ORGANIZED HEALTH CARE EDUCATION/TRAINING PROGRAM

## 2023-12-04 RX ORDER — DEXTROMETHORPHAN HYDROBROMIDE AND PROMETHAZINE HYDROCHLORIDE 15; 6.25 MG/5ML; MG/5ML
5 SYRUP ORAL 4 TIMES DAILY PRN
Qty: 240 ML | Refills: 0 | Status: SHIPPED | OUTPATIENT
Start: 2023-12-04

## 2023-12-04 RX ORDER — AZITHROMYCIN 250 MG/1
TABLET, FILM COATED ORAL
Qty: 6 TABLET | Refills: 0 | Status: SHIPPED | OUTPATIENT
Start: 2023-12-04

## 2023-12-04 NOTE — PROGRESS NOTES
"Chief Complaint  Rohan Evans Jr. is a 68 y.o. male presenting for URI (2.5 weeks ).     Retired  with the [employer] company. . 2 children daughter born 1979 and son born 1981.     Patient has a past medical history of T2DM without complications, hypertension, hyperlipidemia, PVCs, non-sustained VT (longest 8 beats, Holter 2/3/23), systolic murmur (TTE 2/3/23 EF 61%, mild AS, mild AR, mild TR, est cards Dr. Waters), GERD and allergic rhinitis during spring/summer.    History of Present Illness  Patient is here accompanied by his wife.  Symptoms started right after his previous visit with me, about 2-2.5 weeks ago.  The first couple of days with nasal congestion, drainage, no sore throat.  After 2 days he almost lost his voice and started coughing, he was hoarse for couple days, hoarseness has been on and off.  He continues to cough a lot, sometimes with phlegm.  He has decreased appetite, sometimes mildly weak.  No fever or chills.  He has not felt any improvement over the last few days.  No shortness of breath or wheezing.  No known sick contacts.  Wife is not ill.  He did not check for COVID at any point.    The following portions of the patient's history were reviewed and updated as appropriate: allergies, current medications, past family history, past medical history, past social history, past surgical history, and problem list.    Objective  /70 (BP Location: Left arm, Patient Position: Sitting, Cuff Size: Adult)   Pulse 60   Temp 98.6 °F (37 °C) (Temporal)   Ht 182 cm (71.65\")   Wt 96.2 kg (212 lb)   BMI 29.03 kg/m²     Physical Exam  Vitals reviewed.   Constitutional:       Appearance: Normal appearance.   HENT:      Head: Normocephalic and atraumatic.      Right Ear: Tympanic membrane, ear canal and external ear normal. There is no impacted cerumen.      Left Ear: Tympanic membrane, ear canal and external ear normal.      Nose: Congestion present.      Mouth/Throat:      " Mouth: Mucous membranes are moist.      Pharynx: Oropharynx is clear. No oropharyngeal exudate or posterior oropharyngeal erythema.   Eyes:      Extraocular Movements: Extraocular movements intact.      Conjunctiva/sclera: Conjunctivae normal.   Cardiovascular:      Rate and Rhythm: Normal rate and regular rhythm.      Heart sounds: Murmur heard.   Pulmonary:      Effort: Pulmonary effort is normal. No respiratory distress.      Breath sounds: Normal breath sounds. No wheezing.   Musculoskeletal:         General: No swelling.      Cervical back: Neck supple.   Skin:     General: Skin is warm and dry.   Neurological:      Mental Status: He is alert and oriented to person, place, and time. Mental status is at baseline.   Psychiatric:         Behavior: Behavior normal.         Thought Content: Thought content normal.         Assessment/Plan   1. Lower respiratory infection  Likely initial viral infection, but symptoms lingering with cough, still some congestion.  No facial pain.  Cannot rule out start of bacterial infection given his continued cough with phlegm.  Recommend starting on Z-Anthony.  We will also do trial of cough syrup for symptom relief.  He can also try over-the-counter Mucinex for mucus relief.  If any worsening symptoms he should get back in touch, consider emergency room if any severe symptoms.  - azithromycin (Zithromax Z-Anthony) 250 MG tablet; Take 2 tablets by mouth on day 1, then 1 tablet daily on days 2-5  Dispense: 6 tablet; Refill: 0  - promethazine-dextromethorphan (PROMETHAZINE-DM) 6.25-15 MG/5ML syrup; Take 5 mL by mouth 4 (Four) Times a Day As Needed for Cough.  Dispense: 240 mL; Refill: 0      Return if symptoms worsen or fail to improve, for Next scheduled follow up.    Future Appointments         Provider Department Center    2/20/2024 9:00 AM Sav Mulligan MD [facility] INTERNAL MEDICINE [place]    5/20/2024 8:00 AM Sav Mulligan MD [facility]  INTERNAL MEDICINE [place]    8/7/2024 1:30 PM Melchor Waters MD [facility] CARDIOLOGY [place]              Sav Mulligan MD  Family Medicine  12/04/2023

## 2024-01-05 DIAGNOSIS — J30.1 SEASONAL ALLERGIC RHINITIS DUE TO POLLEN: ICD-10-CM

## 2024-01-05 RX ORDER — MONTELUKAST SODIUM 10 MG/1
10 TABLET ORAL DAILY
Qty: 90 TABLET | Refills: 1 | Status: SHIPPED | OUTPATIENT
Start: 2024-01-05

## 2024-01-08 ENCOUNTER — PATIENT MESSAGE (OUTPATIENT)
Dept: INTERNAL MEDICINE | Facility: CLINIC | Age: 69
End: 2024-01-08
Payer: MEDICARE

## 2024-01-08 DIAGNOSIS — I10 ESSENTIAL HYPERTENSION: ICD-10-CM

## 2024-01-08 DIAGNOSIS — E11.9 TYPE 2 DIABETES MELLITUS WITHOUT COMPLICATION, WITHOUT LONG-TERM CURRENT USE OF INSULIN: ICD-10-CM

## 2024-01-08 RX ORDER — LISINOPRIL 20 MG/1
20 TABLET ORAL DAILY
Qty: 90 TABLET | Refills: 1 | Status: SHIPPED | OUTPATIENT
Start: 2024-01-08

## 2024-01-08 RX ORDER — ROSUVASTATIN CALCIUM 5 MG/1
5 TABLET, COATED ORAL DAILY
Qty: 90 TABLET | Refills: 1 | Status: SHIPPED | OUTPATIENT
Start: 2024-01-08

## 2024-02-20 ENCOUNTER — HOSPITAL ENCOUNTER (OUTPATIENT)
Dept: GENERAL RADIOLOGY | Facility: HOSPITAL | Age: 69
Discharge: HOME | End: 2024-02-20
Admitting: STUDENT IN AN ORGANIZED HEALTH CARE EDUCATION/TRAINING PROGRAM
Payer: MEDICARE

## 2024-02-20 ENCOUNTER — OFFICE VISIT (OUTPATIENT)
Dept: INTERNAL MEDICINE | Facility: CLINIC | Age: 69
End: 2024-02-20
Payer: MEDICARE

## 2024-02-20 VITALS
TEMPERATURE: 98.2 F | WEIGHT: 223 LBS | DIASTOLIC BLOOD PRESSURE: 88 MMHG | BODY MASS INDEX: 30.2 KG/M2 | HEIGHT: 72 IN | SYSTOLIC BLOOD PRESSURE: 150 MMHG | HEART RATE: 56 BPM

## 2024-02-20 DIAGNOSIS — R05.8 COUGH PRESENT FOR GREATER THAN 3 WEEKS: ICD-10-CM

## 2024-02-20 DIAGNOSIS — E11.29 TYPE 2 DIABETES MELLITUS WITH MICROALBUMINURIA, WITH LONG-TERM CURRENT USE OF INSULIN: Chronic | ICD-10-CM

## 2024-02-20 DIAGNOSIS — Z79.4 TYPE 2 DIABETES MELLITUS WITH MICROALBUMINURIA, WITH LONG-TERM CURRENT USE OF INSULIN: Chronic | ICD-10-CM

## 2024-02-20 DIAGNOSIS — I10 ESSENTIAL HYPERTENSION: Primary | Chronic | ICD-10-CM

## 2024-02-20 DIAGNOSIS — R80.9 TYPE 2 DIABETES MELLITUS WITH MICROALBUMINURIA, WITH LONG-TERM CURRENT USE OF INSULIN: Chronic | ICD-10-CM

## 2024-02-20 LAB
EXPIRATION DATE: ABNORMAL
HBA1C MFR BLD: 7.8 % (ref 4.5–5.7)
Lab: ABNORMAL

## 2024-02-20 PROCEDURE — 71046 X-RAY EXAM CHEST 2 VIEWS: CPT

## 2024-02-20 RX ORDER — LISINOPRIL AND HYDROCHLOROTHIAZIDE 20; 12.5 MG/1; MG/1
1 TABLET ORAL DAILY
Qty: 30 TABLET | Refills: 0 | Status: SHIPPED | OUTPATIENT
Start: 2024-02-20 | End: 2024-02-20 | Stop reason: SDUPTHER

## 2024-02-20 RX ORDER — LISINOPRIL AND HYDROCHLOROTHIAZIDE 20; 12.5 MG/1; MG/1
1 TABLET ORAL DAILY
Qty: 90 TABLET | Refills: 0 | Status: SHIPPED | OUTPATIENT
Start: 2024-02-20

## 2024-02-20 RX ORDER — SEMAGLUTIDE 0.68 MG/ML
0.5 INJECTION, SOLUTION SUBCUTANEOUS WEEKLY
Qty: 9 ML | Refills: 0 | Status: SHIPPED | OUTPATIENT
Start: 2024-02-20

## 2024-02-20 RX ORDER — ROSUVASTATIN CALCIUM 5 MG/1
5 TABLET, COATED ORAL DAILY
Qty: 90 TABLET | Refills: 1 | Status: CANCELLED | OUTPATIENT
Start: 2024-02-20

## 2024-02-20 RX ORDER — INSULIN GLARGINE 100 [IU]/ML
30 INJECTION, SOLUTION SUBCUTANEOUS NIGHTLY
Qty: 30 ML | Refills: 1 | Status: SHIPPED | OUTPATIENT
Start: 2024-02-20

## 2024-02-20 NOTE — PROGRESS NOTES
"Chief Complaint  Rohan Evans Jr. is a 68 y.o. male presenting for Diabetes.     Retired  with the [employer]. . 2 children, daughter born 1979 and son born 1981.     Patient has a past medical history of T2DM without complications, hypertension, hyperlipidemia, PVCs, non-sustained VT (longest 8 beats, Holter 2/3/23), systolic murmur (TTE 2/3/23 EF 61%, mild AS, mild AR, mild TR, est cards Dr. Waters), GERD and allergic rhinitis during spring/summer.    History of Present Illness  Patient is here for follow-up.    He was treated for lower respiratory infection with Z-Anthony and December.  He still has lingering cough, sometimes green phlegm.  Overall his symptoms have tapered, but will ongoing.  No fever or chills.  He did smoke cigarettes in the past, but it has been years since he quit.    He was expecting his A1c to get somewhat worse, he has not focused enough on his diet over the holidays.  He tried to increase the Ozempic previously from 0.25 mg to 0.5 mg and had side effects for the first 3 days, but he is interested in giving it another try at this time.  [facility]    He has not been checking his home blood pressures recently.    He does have follow-up with cardiology this week.    The following portions of the patient's history were reviewed and updated as appropriate: allergies, current medications, past family history, past medical history, past social history, past surgical history, and problem list.    Objective  /88 (BP Location: Left arm, Patient Position: Sitting, Cuff Size: Adult)   Pulse 56   Temp 98.2 °F (36.8 °C) (Temporal)   Ht 182 cm (71.65\")   Wt 101 kg (223 lb)   BMI 30.54 kg/m²     Physical Exam  Vitals reviewed.   Constitutional:       Appearance: Normal appearance.   HENT:      Head: Normocephalic and atraumatic.      Nose: No congestion.   Eyes:      Extraocular Movements: " Extraocular movements intact.      Conjunctiva/sclera: Conjunctivae normal.   Cardiovascular:      Rate and Rhythm: Normal rate and regular rhythm.      Heart sounds: Normal heart sounds. Murmur heard.   Pulmonary:      Effort: Pulmonary effort is normal. No respiratory distress.      Breath sounds: Normal breath sounds. No wheezing.   Abdominal:      Palpations: Abdomen is soft. There is no mass.   Musculoskeletal:      Cervical back: Neck supple.      Right lower leg: No edema.      Left lower leg: No edema.   Skin:     General: Skin is warm and dry.   Neurological:      Mental Status: He is alert and oriented to person, place, and time. Mental status is at baseline.   Psychiatric:         Behavior: Behavior normal.         Thought Content: Thought content normal.         Assessment/Plan   1. Type 2 diabetes mellitus with microalbuminuria, with long-term current use of insulin  Hemoglobin A1C   Date Value Ref Range Status   02/20/2024 7.8 (A) 4.5 - 5.7 % Final   11/15/2023 7.3 (A) 4.5 - 5.7 % Final   08/15/2023 6.5 % Final   02/04/2021 7.10 (H) 4.80 - 5.60 % Final   Worsening glycemic control.  Goal is A1c below 7.  He will do a trial to increase Ozempic from 0.25 mg to 0.5 mg.  If he tolerates he can continue on this dose, and we can potentially increase it further.  Otherwise I would consider increasing his insulin glargine, he is currently on 30 units, I would probably go up to 34 units given his A1c.  For now we will hold off and see if he tolerates the increased Ozempic.  I will also [facility] as [facility] by patient.  - POC Glycosylated Hemoglobin (Hb A1C)  - Comprehensive Metabolic Panel; Future  - MicroAlbumin, Urine, Random - Urine, Clean Catch; Future  - Hemoglobin A1c; Future  - Insulin Glargine (BASAGLAR KWIKPEN) 100 UNIT/ML injection pen; Inject 30 Units under the skin into the appropriate area as directed Every Night. inject into the appropriate area  Dispense: 30 mL;  Refill: 1  - Semaglutide,0.25 or 0.5MG/DOS, (Ozempic, 0.25 or 0.5 MG/DOSE,) 2 MG/3ML solution pen-injector; Inject 0.5 mg under the skin into the appropriate area as directed 1 (One) Time Per Week.  Dispense: 9 mL; Refill: 0  - Ambulatory Referral to Endocrinology    2. Essential hypertension  BP Readings from Last 3 Encounters:   02/20/24 150/88   12/04/23 136/70   11/15/23 130/80   Blood pressure not at goal.  Will add hydrochlorothiazide to his regimen.  He will discontinue the lisinopril 10 mg and will do the combination with hydrochlorothiazide.  Counseled on side effects.  Follow-up in 3 months with his diabetes  - lisinopril-hydrochlorothiazide (PRINZIDE,ZESTORETIC) 20-12.5 MG per tablet; Take 1 tablet by mouth Daily. Stop Lisinopril  Dispense: 90 tablet; Refill: 0    3. Cough present for greater than 3 weeks  Will do x-ray to evaluate.  If symptoms do not improve over the next 4-6 weeks I would consider doing CT scan.  He has been coughing for close to 3 months at this time.  - XR Chest PA & Lateral; Future          Return in about 13 weeks (around 5/21/2024) for Recheck.      Future Appointments         Provider Department Center    2/20/2024 10:00 AM [facility] XR 2 [facility] XRAY AT [facility]    2/22/2024 1:00 PM (Arrive by 12:30 PM) Melchor Waters MD [facility] CARDIOLOGY [place]    5/20/2024 8:00 AM Sav Mulligan MD [facility] INTERNAL MEDICINE [place]              Sav Mulligan MD  Family Medicine  02/20/2024

## 2024-02-21 RX ORDER — METOPROLOL SUCCINATE 50 MG/1
50 TABLET, EXTENDED RELEASE ORAL DAILY
Qty: 90 TABLET | Refills: 3 | Status: SHIPPED | OUTPATIENT
Start: 2024-02-21

## 2024-02-22 ENCOUNTER — OFFICE VISIT (OUTPATIENT)
Dept: CARDIOLOGY | Facility: CLINIC | Age: 69
End: 2024-02-22
Payer: MEDICARE

## 2024-02-22 VITALS
SYSTOLIC BLOOD PRESSURE: 140 MMHG | WEIGHT: 223 LBS | OXYGEN SATURATION: 96 % | HEIGHT: 72 IN | BODY MASS INDEX: 30.2 KG/M2 | HEART RATE: 73 BPM | DIASTOLIC BLOOD PRESSURE: 80 MMHG

## 2024-02-22 DIAGNOSIS — I10 PRIMARY HYPERTENSION: ICD-10-CM

## 2024-02-22 DIAGNOSIS — E78.2 MIXED HYPERLIPIDEMIA: ICD-10-CM

## 2024-02-22 DIAGNOSIS — I35.0 AORTIC STENOSIS, MILD: Primary | ICD-10-CM

## 2024-02-22 DIAGNOSIS — I25.10 CORONARY ARTERY CALCIFICATION SEEN ON CT SCAN: ICD-10-CM

## 2024-02-22 PROCEDURE — 3077F SYST BP >= 140 MM HG: CPT | Performed by: INTERNAL MEDICINE

## 2024-02-22 PROCEDURE — 99214 OFFICE O/P EST MOD 30 MIN: CPT | Performed by: INTERNAL MEDICINE

## 2024-02-22 PROCEDURE — 3079F DIAST BP 80-89 MM HG: CPT | Performed by: INTERNAL MEDICINE

## 2024-02-22 RX ORDER — ASPIRIN 81 MG/1
81 TABLET ORAL DAILY
Start: 2024-02-22

## 2024-02-22 NOTE — PROGRESS NOTES
[facility] CARDIOLOGY    Return Patient Office Visit    Patient Name: Rohan Evans Jr.  : 1955   MRN: [id number]   Care Team: Patient Care Team:  Sav Mulligan MD as PCP - General (Family Medicine)  Melchor Waters MD as Cardiologist (Cardiology)  Flores Larkin PA-C as Physician Assistant (Physician Assistant)  Sav Mulligan MD as Consulting Physician (Family Medicine)    Chief Complaint   Patient presents with    Chest pain, unspecified type     6-Mo F/U     HPI: Rohan Evans Jr. is a 68 y.o. male with a history of diabetes on insulin, hypertension, hyperlipidemia, GERD, seasonal allergies who presents today for routine follow-up of frequent non-sustained VT and mild AS.  Last seen Mr. Evans in 2023.  Since that time he reports feeling well with no further episodes of chest pain.  However, he does note that he is less physically active in the fall and winter with less yard work to do.  He has done well with low-dose of rosuvastatin without any reported myalgias.      Subjective   Review of Systems   Constitutional:  Negative for activity change.   Respiratory:  Negative for chest tightness and shortness of breath.    Cardiovascular:  Negative for chest pain and palpitations.       Past Medical History:   Diagnosis Date    Colon polyp     Drug-induced erectile dysfunction 2021    Increase lisinopril 20->40 mg    Elevated liver enzymes 2021    Essential hypertension     GERD (gastroesophageal reflux disease)     Heart murmur     History of cardiac murmur     Since young age. Done TTE and stress tests. Normal per paitient    Mixed hyperlipidemia     PVC's (premature ventricular contractions) 2023    Seasonal allergic rhinitis     Systolic murmur     Since young age per paitient. TTE 2/3/23 EF 61%, mild AS, mild AR, mild TR,    Type 2 diabetes mellitus without complication, without long-term current use of insulin       Tobacco Use: Medium Risk (2/22/2024)    Patient History     Smoking Tobacco Use: Former     Smokeless Tobacco Use: Never     Passive Exposure: Not on file     Allergies   Allergen Reactions    Penicillins Swelling     High temp and red blotches    Lipitor [Atorvastatin] Other (See Comments)     Either muscle or liver lab abnormality        Current Outpatient Medications:     Continuous Blood Gluc  (FreeStyle Saeid 14 Day [place]lock) device, 1 Device Take As Directed., Disp: 1 each, Rfl: 0    Continuous Blood Gluc Sensor (FreeStyle Saeid 3 Sensor) misc, 1 Units Every 14 (Fourteen) Days., Disp: 5 each, Rfl: 3    Cyanocobalamin (B-12) 1000 MCG capsule, Take  by mouth., Disp: , Rfl:     Insulin Glargine (BASAGLAR KWIKPEN) 100 UNIT/ML injection pen, Inject 30 Units under the skin into the appropriate area as directed Every Night. inject into the appropriate area, Disp: 30 mL, Rfl: 1    Insulin Pen Needle 32G X 4 MM misc, Use 1 Units Daily., Disp: 100 each, Rfl: 11    Lancets Misc. (Accu-Chek FastClix Lancet) kit, Use to check blood sugar up to three times a day, Disp: 1 kit, Rfl: 3    lisinopril-hydrochlorothiazide (PRINZIDE,ZESTORETIC) 20-12.5 MG per tablet, Take 1 tablet by mouth Daily. Stop Lisinopril, Disp: 90 tablet, Rfl: 0    metFORMIN (GLUCOPHAGE) 1000 MG tablet, Take 1 tablet by mouth 2 (Two) Times a Day With Meals., Disp: 180 tablet, Rfl: 1    metoprolol succinate XL (TOPROL-XL) 50 MG 24 hr tablet, TAKE 1 TABLET BY MOUTH EVERY DAY, Disp: 90 tablet, Rfl: 3    montelukast (SINGULAIR) 10 MG tablet, TAKE 1 TABLET DAILY, Disp: 90 tablet, Rfl: 1    multivitamin with minerals tablet tablet, Take 1 tablet by mouth Daily., Disp: , Rfl:     omeprazole (priLOSEC) 20 MG capsule, TAKE 1 CAPSULE DAILY, Disp: 90 capsule, Rfl: 1    OneTouch Verio test strip, Use to check blood sugar up to three times a day, Disp: 100 each, Rfl: 3    rosuvastatin (CRESTOR) 5 MG tablet, Take 1 tablet by mouth Daily., Disp: 90 tablet, Rfl:  "1    Semaglutide,0.25 or 0.5MG/DOS, (Ozempic, 0.25 or 0.5 MG/DOSE,) 2 MG/3ML solution pen-injector, Inject 0.5 mg under the skin into the appropriate area as directed 1 (One) Time Per Week., Disp: 9 mL, Rfl: 0    promethazine-dextromethorphan (PROMETHAZINE-DM) 6.25-15 MG/5ML syrup, Take 5 mL by mouth 4 (Four) Times a Day As Needed for Cough. (Patient not taking: Reported on 2/22/2024), Disp: 240 mL, Rfl: 0    Objective     Vitals:    02/22/24 1258   BP: 148/82   BP Location: Left arm   Patient Position: Sitting   Cuff Size: Adult   Pulse: 73   SpO2: 96%   Weight: 101 kg (223 lb)   Height: 182.9 cm (72\")   Body mass index is 30.24 kg/m².  Gen: well developed, lying in bed, comfortable appearing  HEENT: MMM, sclera anicteric, conjunctiva normal  CV: regular rate, regular rhythm, III/VI MILKA at RUSB with radiation to carotids, normal S1, S2. 2+ radial and DP pulses  Pulm: RA, normal work of breathing, no wheezes, rales, rhonchi  Abd: soft, non-tender, non-distended  Ext: normal bulk for age, normal tone, no dependent edema  Neuro: alert, oriented, face symmetrical, moving all extremities well  Psych: normal mood, appropriate affect     Most recent PCP note, imaging tests, and labs reviewed.    Labs:    Lab Results   Component Value Date    GLUCOSE 123 (H) 11/15/2023    BUN 19 11/15/2023    CREATININE 0.91 11/15/2023    EGFRIFNONA 79 09/07/2021    BCR 20.9 11/15/2023    K 4.9 11/15/2023    CO2 28.9 11/15/2023    CALCIUM 10.1 11/15/2023    ALBUMIN 4.8 11/15/2023    AST 29 11/15/2023    ALT 24 11/15/2023     Lab Results   Component Value Date    HGBA1C 7.8 (A) 02/20/2024     Lab Results   Component Value Date    CHOL 161 11/15/2023    TRIG 84 11/15/2023    HDL 53 11/15/2023    LDL 92 11/15/2023     Exercise stress echocardiogram March 22, 2017  1.  Normal exercise stress echocardiogram.  LVEF at peak exercise greater than 65%  2.  Normal resting 2D echocardiogram, LVEF 60%  3.  No obvious evidence of ischemia noted  4.  " Hypertensive response noted with exercise    Echocardiogram January 2019  1. left ventricle dimensions are normal, LVH with basal septal thickening.  Wall motion normal, LVEF 68%  2.  Grade 1 diastolic dysfunction suggestive of impaired relaxation  3.  Mild aortic stenosis with mild aortic regurgitation.  Mean gradient 17 mmHg, peak velocity 2.8 m/s, MICHELET 1.4 cm²  4.  Mild tricuspid regurgitation, RVSP 31 mmHg    2/23/23 - Transthoracic Echo Complete    Left ventricular systolic function is normal. Calculated left ventricular EF = 61.2% Normal left ventricular cavity size and wall thickness noted. All left ventricular wall segments contract normally. Left ventricular diastolic function is consistent with (grade I) impaired relaxation.    Normal right ventricular cavity size, wall thickness and systolic function    Is moderate calcification of the aortic valve with moderate restriction of valve opening.    Mild aortic valve regurgitation is present. Mild aortic valve stenosis is present.    Ao mean PG 14.2 mmHg    Ao pk helio 255.4 cm/sec    MICHELET(I,D) 1.8 cm2    9/29/23 - Stress Test with MPI    Patient denied any chest discomfort/pain, or any other symptoms during exercise.    Expected exercise duration 7:10, actual 7:40; NIGEL (-6).    Inferolateral ST depression noted, up to 2mm and horizonal with exertion. 0.5mm downsloping ST depressions in recovery.    Moderate risk for ischemic heart disease based on Duke Treadmill Score.    Findings consistent with an abnormal ECG stress test.    Myocardial perfusion imaging indicates a normal myocardial perfusion study with no evidence of ischemia.    Left ventricular ejection fraction is normal (Calculated EF = 66%).    Impressions are consistent with a low risk study based on myocardial perfusion imaging alone. Review of CT attenuation correction images shows moderate CAC in the LAD as well as a heavily calcified AV.    There is no prior study available for comparison.    EKG  December 5, 2018  Sinus bradycardia heart rate 54, 1 PVC    1/25 - 1/27/23 - ZioXT Wearable Heart Monitor  An abnormal monitor study. Frequent episodes (400) of nonsustained VT, longest 8 beats  Patient trigger associated with VT episode     Procedures    Advance Care Planning   ACP discussion was held with the patient during this visit. Patient does not have an advance directive, information provided.       Assessment & Plan       ICD-10-CM ICD-9-CM   1. Aortic stenosis, mild  I35.0 424.1   2. Primary hypertension  I10 401.9   3. Mixed hyperlipidemia  E78.2 272.2   4. Coronary artery calcification seen on CT scan  I25.10 414.00       History of mild AS, mild AR, mild TR   - Peak velocity and gradients was stable on echocardiogram from last year, no repeat TTE at this time    PVCs  Frequent Non-sustained VT episodes   - Asymptomatic, continue beta-blocker   - stress last year negative for ischemia    Primary prevention of ASCVD  Coronary artery calcium, preclinical atherosclerosis   - Elevated 10-year risk score, calcium on CT stress images   - Continue rosuvastatin, tolerating low-dose well with LDL now less than 100.  Would resume daily aspirin 81 mg daily    Hypertension   - Above goal today but recently put on HCTZ 2 days ago by his primary, will defer any changes at this visit    Return in about 1 year (around 2/22/2025).    VALENTE Waters MD, MS  02/22/24    [facility] Cardiology  [address] [address] [phone]

## 2024-02-28 ENCOUNTER — PATIENT MESSAGE (OUTPATIENT)
Dept: INTERNAL MEDICINE | Facility: CLINIC | Age: 69
End: 2024-02-28
Payer: MEDICARE

## 2024-02-28 DIAGNOSIS — R80.9 TYPE 2 DIABETES MELLITUS WITH MICROALBUMINURIA, WITH LONG-TERM CURRENT USE OF INSULIN: Chronic | ICD-10-CM

## 2024-02-28 DIAGNOSIS — E11.29 TYPE 2 DIABETES MELLITUS WITH MICROALBUMINURIA, WITH LONG-TERM CURRENT USE OF INSULIN: Chronic | ICD-10-CM

## 2024-02-28 DIAGNOSIS — I10 ESSENTIAL HYPERTENSION: Chronic | ICD-10-CM

## 2024-02-28 DIAGNOSIS — Z79.4 TYPE 2 DIABETES MELLITUS WITH MICROALBUMINURIA, WITH LONG-TERM CURRENT USE OF INSULIN: Chronic | ICD-10-CM

## 2024-02-28 RX ORDER — LANCING DEVICE/LANCETS
KIT MISCELLANEOUS
Qty: 1 KIT | Refills: 3 | Status: SHIPPED | OUTPATIENT
Start: 2024-02-28

## 2024-02-28 RX ORDER — BLOOD SUGAR DIAGNOSTIC
STRIP MISCELLANEOUS
Qty: 100 EACH | Refills: 3 | Status: SHIPPED | OUTPATIENT
Start: 2024-02-28

## 2024-02-28 RX ORDER — LISINOPRIL AND HYDROCHLOROTHIAZIDE 20; 12.5 MG/1; MG/1
1 TABLET ORAL DAILY
Qty: 90 TABLET | Refills: 1 | Status: SHIPPED | OUTPATIENT
Start: 2024-02-28

## 2024-02-28 NOTE — TELEPHONE ENCOUNTER
Rx Refill Note  Requested Prescriptions     Pending Prescriptions Disp Refills    lisinopril-hydrochlorothiazide (PRINZIDE,ZESTORETIC) 20-12.5 MG per tablet 90 tablet 1     Sig: Take 1 tablet by mouth Daily. Stop Lisinopril    OneTouch Verio test strip 100 each 3     Sig: Use to check blood sugar up to three times a day    Lancets Misc. (Accu-Chek FastClix Lancet) kit 1 kit 3     Sig: Use to check blood sugar up to three times a day    Insulin Pen Needle 32G X 4 MM misc 100 each 11     Sig: Use 1 Units Daily.      Last office visit with prescribing clinician: 2/20/2024   Last telemedicine visit with prescribing clinician: Visit date not found   Next office visit with prescribing clinician: 5/20/2024                         Would you like a call back once the refill request has been completed: [] Yes [] No    If the office needs to give you a call back, can they leave a voicemail: [] Yes [] No    Radha Nam RN  02/28/24, 13:16 EST

## 2024-03-05 ENCOUNTER — TELEPHONE (OUTPATIENT)
Dept: INTERNAL MEDICINE | Facility: CLINIC | Age: 69
End: 2024-03-05
Payer: MEDICARE

## 2024-03-05 NOTE — TELEPHONE ENCOUNTER
Spoke with pt. Clarified the OneTouch test strips and AccuCheck lancet kit is the correct brand per the pt.    Called [facility]. Clarified and provided the above information. They will process the rx as-is

## 2024-03-05 NOTE — TELEPHONE ENCOUNTER
Pharmacy Name:  [facility]    Reference Number (if applicable):[id number]    Pharmacy representative name: ISABELLA    Pharmacy representative phone number: [phone]    What medication are you calling in regards to: OneTouch Verio test strip  AND Lancets Misc. (Accu-Chek FastClix Lancet) kit     What question does the pharmacy have: QUESTIONS REGARDING THESE 2 REFILLS    Who is the provider that prescribed the medication: DR CALDERON    Additional notes:

## 2024-03-07 ENCOUNTER — PATIENT MESSAGE (OUTPATIENT)
Dept: INTERNAL MEDICINE | Facility: CLINIC | Age: 69
End: 2024-03-07
Payer: MEDICARE

## 2024-03-07 DIAGNOSIS — E11.29 TYPE 2 DIABETES MELLITUS WITH MICROALBUMINURIA, WITH LONG-TERM CURRENT USE OF INSULIN: Primary | Chronic | ICD-10-CM

## 2024-03-07 DIAGNOSIS — R80.9 TYPE 2 DIABETES MELLITUS WITH MICROALBUMINURIA, WITH LONG-TERM CURRENT USE OF INSULIN: Primary | Chronic | ICD-10-CM

## 2024-03-07 DIAGNOSIS — Z79.4 TYPE 2 DIABETES MELLITUS WITH MICROALBUMINURIA, WITH LONG-TERM CURRENT USE OF INSULIN: Primary | Chronic | ICD-10-CM

## 2024-03-24 ENCOUNTER — PATIENT MESSAGE (OUTPATIENT)
Dept: INTERNAL MEDICINE | Facility: CLINIC | Age: 69
End: 2024-03-24
Payer: MEDICARE

## 2024-03-24 DIAGNOSIS — R05.3 CHRONIC COUGH: Primary | ICD-10-CM

## 2024-04-02 RX ORDER — METOPROLOL SUCCINATE 50 MG/1
50 TABLET, EXTENDED RELEASE ORAL DAILY
Qty: 90 TABLET | Refills: 3 | Status: SHIPPED | OUTPATIENT
Start: 2024-04-02

## 2024-04-23 DIAGNOSIS — R80.9 TYPE 2 DIABETES MELLITUS WITH MICROALBUMINURIA, WITH LONG-TERM CURRENT USE OF INSULIN: Chronic | ICD-10-CM

## 2024-04-23 DIAGNOSIS — Z79.4 TYPE 2 DIABETES MELLITUS WITH MICROALBUMINURIA, WITH LONG-TERM CURRENT USE OF INSULIN: Chronic | ICD-10-CM

## 2024-04-23 DIAGNOSIS — E11.29 TYPE 2 DIABETES MELLITUS WITH MICROALBUMINURIA, WITH LONG-TERM CURRENT USE OF INSULIN: Chronic | ICD-10-CM

## 2024-04-23 RX ORDER — SEMAGLUTIDE 0.68 MG/ML
INJECTION, SOLUTION SUBCUTANEOUS
Qty: 9 ML | Refills: 3 | Status: SHIPPED | OUTPATIENT
Start: 2024-04-23

## 2024-04-29 ENCOUNTER — HOSPITAL ENCOUNTER (OUTPATIENT)
Dept: CT IMAGING | Facility: HOSPITAL | Age: 69
Discharge: HOME | End: 2024-04-29
Admitting: STUDENT IN AN ORGANIZED HEALTH CARE EDUCATION/TRAINING PROGRAM
Payer: MEDICARE

## 2024-04-29 DIAGNOSIS — R05.3 CHRONIC COUGH: ICD-10-CM

## 2024-04-29 PROCEDURE — 71250 CT THORAX DX C-: CPT

## 2024-04-30 PROBLEM — K76.89 LIVER CYST: Status: ACTIVE | Noted: 2024-04-30

## 2024-05-20 ENCOUNTER — LAB (OUTPATIENT)
Dept: LAB | Facility: HOSPITAL | Age: 69
End: 2024-05-20
Payer: MEDICARE

## 2024-05-20 ENCOUNTER — OFFICE VISIT (OUTPATIENT)
Dept: INTERNAL MEDICINE | Facility: CLINIC | Age: 69
End: 2024-05-20
Payer: MEDICARE

## 2024-05-20 VITALS
DIASTOLIC BLOOD PRESSURE: 90 MMHG | HEIGHT: 71 IN | HEART RATE: 52 BPM | SYSTOLIC BLOOD PRESSURE: 126 MMHG | WEIGHT: 216 LBS | TEMPERATURE: 98.2 F | BODY MASS INDEX: 30.24 KG/M2

## 2024-05-20 DIAGNOSIS — I10 ESSENTIAL HYPERTENSION: Chronic | ICD-10-CM

## 2024-05-20 DIAGNOSIS — K76.9 LIVER LESION: ICD-10-CM

## 2024-05-20 DIAGNOSIS — R93.2 ABNORMAL CT OF LIVER: ICD-10-CM

## 2024-05-20 DIAGNOSIS — E66.9 OBESITY, CLASS I, BMI 30-34.9: ICD-10-CM

## 2024-05-20 DIAGNOSIS — R80.9 TYPE 2 DIABETES MELLITUS WITH MICROALBUMINURIA, WITH LONG-TERM CURRENT USE OF INSULIN: Chronic | ICD-10-CM

## 2024-05-20 DIAGNOSIS — R05.3 CHRONIC COUGH: ICD-10-CM

## 2024-05-20 DIAGNOSIS — Z79.4 TYPE 2 DIABETES MELLITUS WITH MICROALBUMINURIA, WITH LONG-TERM CURRENT USE OF INSULIN: Chronic | ICD-10-CM

## 2024-05-20 DIAGNOSIS — E11.29 TYPE 2 DIABETES MELLITUS WITH MICROALBUMINURIA, WITH LONG-TERM CURRENT USE OF INSULIN: Chronic | ICD-10-CM

## 2024-05-20 DIAGNOSIS — Z00.00 ENCOUNTER FOR SUBSEQUENT ANNUAL WELLNESS VISIT (AWV) IN MEDICARE PATIENT: Primary | ICD-10-CM

## 2024-05-20 LAB
ALBUMIN SERPL-MCNC: 4.5 G/DL (ref 3.5–5.2)
ALBUMIN UR-MCNC: 3.1 MG/DL
ALBUMIN/GLOB SERPL: 1.8 G/DL
ALP SERPL-CCNC: 37 U/L (ref 39–117)
ALT SERPL W P-5'-P-CCNC: 33 U/L (ref 1–41)
ANION GAP SERPL CALCULATED.3IONS-SCNC: 12.6 MMOL/L (ref 5–15)
AST SERPL-CCNC: 23 U/L (ref 1–40)
BILIRUB SERPL-MCNC: 0.5 MG/DL (ref 0–1.2)
BUN SERPL-MCNC: 21 MG/DL (ref 8–23)
BUN/CREAT SERPL: 20.6 (ref 7–25)
CALCIUM SPEC-SCNC: 9.9 MG/DL (ref 8.6–10.5)
CHLORIDE SERPL-SCNC: 103 MMOL/L (ref 98–107)
CO2 SERPL-SCNC: 27.4 MMOL/L (ref 22–29)
CREAT SERPL-MCNC: 1.02 MG/DL (ref 0.76–1.27)
EGFRCR SERPLBLD CKD-EPI 2021: 79.1 ML/MIN/1.73
EXPIRATION DATE: ABNORMAL
GLOBULIN UR ELPH-MCNC: 2.5 GM/DL
GLUCOSE SERPL-MCNC: 171 MG/DL (ref 65–99)
HBA1C MFR BLD: 8.7 % (ref 4.5–5.7)
Lab: ABNORMAL
POTASSIUM SERPL-SCNC: 4 MMOL/L (ref 3.5–5.2)
PROT SERPL-MCNC: 7 G/DL (ref 6–8.5)
SODIUM SERPL-SCNC: 143 MMOL/L (ref 136–145)

## 2024-05-20 PROCEDURE — 1160F RVW MEDS BY RX/DR IN RCRD: CPT | Performed by: STUDENT IN AN ORGANIZED HEALTH CARE EDUCATION/TRAINING PROGRAM

## 2024-05-20 PROCEDURE — G0439 PPPS, SUBSEQ VISIT: HCPCS | Performed by: STUDENT IN AN ORGANIZED HEALTH CARE EDUCATION/TRAINING PROGRAM

## 2024-05-20 PROCEDURE — 1170F FXNL STATUS ASSESSED: CPT | Performed by: STUDENT IN AN ORGANIZED HEALTH CARE EDUCATION/TRAINING PROGRAM

## 2024-05-20 PROCEDURE — 1126F AMNT PAIN NOTED NONE PRSNT: CPT | Performed by: STUDENT IN AN ORGANIZED HEALTH CARE EDUCATION/TRAINING PROGRAM

## 2024-05-20 PROCEDURE — 80053 COMPREHEN METABOLIC PANEL: CPT

## 2024-05-20 PROCEDURE — 3052F HG A1C>EQUAL 8.0%<EQUAL 9.0%: CPT | Performed by: STUDENT IN AN ORGANIZED HEALTH CARE EDUCATION/TRAINING PROGRAM

## 2024-05-20 PROCEDURE — 82043 UR ALBUMIN QUANTITATIVE: CPT

## 2024-05-20 PROCEDURE — 99214 OFFICE O/P EST MOD 30 MIN: CPT | Performed by: STUDENT IN AN ORGANIZED HEALTH CARE EDUCATION/TRAINING PROGRAM

## 2024-05-20 PROCEDURE — 1159F MED LIST DOCD IN RCRD: CPT | Performed by: STUDENT IN AN ORGANIZED HEALTH CARE EDUCATION/TRAINING PROGRAM

## 2024-05-20 PROCEDURE — 83036 HEMOGLOBIN GLYCOSYLATED A1C: CPT | Performed by: STUDENT IN AN ORGANIZED HEALTH CARE EDUCATION/TRAINING PROGRAM

## 2024-05-20 PROCEDURE — 3080F DIAST BP >= 90 MM HG: CPT | Performed by: STUDENT IN AN ORGANIZED HEALTH CARE EDUCATION/TRAINING PROGRAM

## 2024-05-20 PROCEDURE — 3074F SYST BP LT 130 MM HG: CPT | Performed by: STUDENT IN AN ORGANIZED HEALTH CARE EDUCATION/TRAINING PROGRAM

## 2024-05-20 RX ORDER — METOPROLOL SUCCINATE 50 MG/1
75 TABLET, EXTENDED RELEASE ORAL DAILY
Qty: 135 TABLET | Refills: 1 | Status: SHIPPED | OUTPATIENT
Start: 2024-05-20

## 2024-05-20 RX ORDER — LANCING DEVICE/LANCETS
KIT MISCELLANEOUS
Qty: 3 KIT | Refills: 3 | Status: SHIPPED | OUTPATIENT
Start: 2024-05-20

## 2024-05-20 RX ORDER — BLOOD SUGAR DIAGNOSTIC
STRIP MISCELLANEOUS
Qty: 100 EACH | Refills: 3 | Status: SHIPPED | OUTPATIENT
Start: 2024-05-20

## 2024-05-20 NOTE — PROGRESS NOTES
QUICK REFERENCE INFORMATION:  The ABCs of the Annual Wellness Visit    Subsequent Medicare Wellness Visit    Retired  with the [employer]. . 2 children, daughter born 1979 and son born 1981.     Patient has a past medical history of T2DM without complications, hypertension, hyperlipidemia, PVCs, non-sustained VT (longest 8 beats, Holter 2/3/23), systolic murmur (TTE 2/3/23 EF 61%, mild AS, mild AR, mild TR, est cards Dr. Waters, ASA recommended), GERD and allergic rhinitis during spring/summer.    Patient is here for annual Medicare visit and follow-up.    He tells me his cough has almost resolved, he started back on his allergy medications and feels this helped for his symptoms.  We did CT scan of his chest, that also captured a lesion of the liver, possible cyst, but not done with IV contrast.  Patient is amenable to ultrasound to further characterize.    He also did discuss with cardiology regarding aspirin, and they recommended he stay on aspirin for prevention.  They also increase his metoprolol from 50 mg to 75 mg.  He is requesting refill.    Patient also saw his optometrist at [facility], and was noted to have some scarring after his cataract surgery, and he is scheduled [facility] see [facility] next [facility] for laser treatment.    HEALTH RISK ASSESSMENT    1955    Recent Hospitalizations:  No hospitalization(s) within the last year..        Current Medical Providers:  Patient Care Team:  Sav Mulligan MD as PCP - General (Family Medicine)  Melchor Waters MD as Cardiologist (Cardiology)  Flores Larkin PA-C as Physician Assistant (Physician Assistant)  Sav Mulligan MD as Consulting Physician (Family Medicine)        Smoking Status:  Social History     Tobacco Use   Smoking Status Former    Current packs/day: 0.00    Average packs/day: 0.5 packs/day for 20.0 years (10.0 ttl pk-yrs)    Types: Cigarettes    Start date: 1/1/1978    Quit date: 1/1/1998    Years since  quittin.4   Smokeless Tobacco Never       Alcohol Consumption:  Social History     Substance and Sexual Activity   Alcohol Use Yes    Comment: occasional       Depression Screen:       2024     8:05 AM   PHQ-2/PHQ-9 Depression Screening   Little Interest or Pleasure in Doing Things 0-->not at all   Feeling Down, Depressed or Hopeless 0-->not at all   PHQ-9: Brief Depression Severity Measure Score 0       Health Habits and Functional and Cognitive Screenin/20/2024     8:04 AM   Functional & Cognitive Status   Do you have difficulty preparing food and eating? No   Do you have difficulty bathing yourself, getting dressed or grooming yourself? No   Do you have difficulty using the toilet? No   Do you have difficulty moving around from place to place? No   Do you have trouble with steps or getting out of a bed or a chair? No   Current Diet Unhealthy Diet   Dental Exam Up to date   Eye Exam Up to date   Exercise (times per week) 2 times per week   Current Exercises Include Walking   Do you need help using the phone?  No   Are you deaf or do you have serious difficulty hearing?  No   Do you need help to go to places out of walking distance? No   Do you need help preparing meals?  No   Do you need help with housework?  No   Do you need help with laundry? No   Do you need help taking your medications? No   Do you need help managing money? No   Do you ever drive or ride in a car without wearing a seat belt? No   Have you felt unusual stress, anger or loneliness in the last month? No   Who do you live with? Spouse   If you need help, do you have trouble finding someone available to you? No   Have you been bothered in the last four weeks by sexual problems? No   Do you have difficulty concentrating, remembering or making decisions? No       Fall Risk Screen:  STEADI Fall Risk Assessment was completed, and patient is at LOW risk for falls.Assessment completed on:2024    ACE III MINI        Does the  patient have evidence of cognitive impairment? No    Aspirin use counseling: Taking ASA appropriately as indicated    Recent Lab Results:  CMP:  Lab Results   Component Value Date    BUN 19 11/15/2023    CREATININE 0.91 11/15/2023    EGFRIFNONA 79 09/07/2021    BCR 20.9 11/15/2023     11/15/2023    K 4.9 11/15/2023    CO2 28.9 11/15/2023    CALCIUM 10.1 11/15/2023    ALBUMIN 4.8 11/15/2023    BILITOT 0.5 11/15/2023    ALKPHOS 37 (L) 11/15/2023    AST 29 11/15/2023    ALT 24 11/15/2023     HbA1c:  Lab Results   Component Value Date    HGBA1C 8.7 (A) 05/20/2024    HGBA1C 7.8 (A) 02/20/2024     Microalbumin:  Lab Results   Component Value Date    MICROALBUR <1.2 05/02/2022    POCMALB 30 mg/L 05/18/2023    POCCREAT 300 mg/dL 05/18/2023     Lipid Panel  Lab Results   Component Value Date    CHOL 161 11/15/2023    TRIG 84 11/15/2023    HDL 53 11/15/2023    LDL 92 11/15/2023    AST 29 11/15/2023    ALT 24 11/15/2023       Visual Acuity:  No results found.    Age-appropriate Screening Schedule:  Refer to the list below for future screening recommendations based on patient's age, sex and/or medical conditions. Orders for these recommended tests are listed in the plan section. The patient has been provided with a written plan.    Health Maintenance   Topic Date Due    ZOSTER VACCINE (1 of 2) Never done    RSV Vaccine - Adults (1 - 1-dose 60+ series) Never done    TDAP/TD VACCINES (2 - Td or Tdap) 02/25/2021    DIABETIC EYE EXAM  05/26/2023    COVID-19 Vaccine (3 - 2023-24 season) 09/01/2023    DIABETIC FOOT EXAM  05/18/2024    URINE MICROALBUMIN  05/18/2024    INFLUENZA VACCINE  08/01/2024    LIPID PANEL  11/15/2024    HEMOGLOBIN A1C  11/20/2024    BMI FOLLOWUP  04/23/2025    ANNUAL WELLNESS VISIT  05/20/2025    COLORECTAL CANCER SCREENING  06/08/2025    HEPATITIS C SCREENING  Completed    Pneumococcal Vaccine 65+  Completed    AAA SCREEN (ONE-TIME)  Completed        Subjective   History of Present Illness    Rohan  Armen Evans Jr. is a 68 y.o. male who presents for a Subsequent Wellness Visit.    CHRONIC CONDITIONS    The following portions of the patient's history were reviewed and updated as appropriate: allergies, current medications, past family history, past medical history, past social history, past surgical history, and problem list.    Outpatient Medications Prior to Visit   Medication Sig Dispense Refill    aspirin 81 MG EC tablet Take 1 tablet by mouth Daily.      Continuous Blood Gluc  (FreeStyle Saeid 14 Day [place]) device 1 Device Take As Directed. 1 each 0    Continuous Blood Gluc Sensor (FreeStyle Saeid 3 Sensor) misc 1 Units Every 14 (Fourteen) Days. 5 each 3    Cyanocobalamin (B-12) 1000 MCG capsule Take  by mouth.      Insulin Glargine (LANTUS SOLOSTAR) 100 UNIT/ML injection pen Inject 36 Units under the skin into the appropriate area as directed Every Night. Every 5 d increase by 2 units until AM fasting sugar under 130. Max 42u.day      lisinopril-hydrochlorothiazide (PRINZIDE,ZESTORETIC) 20-12.5 MG per tablet Take 1 tablet by mouth Daily. Stop Lisinopril 90 tablet 1    metFORMIN (GLUCOPHAGE) 1000 MG tablet Take 1 tablet by mouth 2 (Two) Times a Day With Meals. 180 tablet 1    montelukast (SINGULAIR) 10 MG tablet TAKE 1 TABLET DAILY 90 tablet 1    multivitamin with minerals tablet tablet Take 1 tablet by mouth Daily.      omeprazole (priLOSEC) 20 MG capsule TAKE 1 CAPSULE DAILY 90 capsule 1    Ozempic, 0.25 or 0.5 MG/DOSE, 2 MG/3ML solution pen-injector INJECT 0.5 MG SUBCUTANEOUSLY  INTO THE APPROPRIATE AREA AS  DIRECTED ONCE WEEKLY 9 mL 3    rosuvastatin (CRESTOR) 5 MG tablet Take 1 tablet by mouth Daily. 90 tablet 1    Insulin Glargine (LANTUS SOLOSTAR) 100 UNIT/ML injection pen Inject 30 Units under the skin into the appropriate area as directed Every Night. 27 mL 1    Insulin Pen Needle 32G X 4 MM misc Use 1 Units Daily. 100 each 11    Lancets Misc. (Accu-Chek FastClix Lancet) kit Use to check  blood sugar up to three times a day 1 kit 3    metoprolol succinate XL (TOPROL-XL) 50 MG 24 hr tablet Take 1 tablet by mouth Daily. 90 tablet 3    OneTouch Verio test strip Use to check blood sugar up to three times a day 100 each 3    promethazine-dextromethorphan (PROMETHAZINE-DM) 6.25-15 MG/5ML syrup Take 5 mL by mouth 4 (Four) Times a Day As Needed for Cough. (Patient not taking: Reported on 2/22/2024) 240 mL 0     No facility-administered medications prior to visit.       Patient Active Problem List   Diagnosis    Type 2 diabetes mellitus with microalbuminuria, with long-term current use of insulin    Essential hypertension    Gastroesophageal reflux disease without esophagitis    Mixed hyperlipidemia    Seasonal allergic rhinitis due to pollen    Systolic murmur    Obesity, Class I, BMI 30-34.9    Polyp of colon    Drug-induced erectile dysfunction    Elevated liver enzymes    PVC's (premature ventricular contractions)    Enlargement of abdominal aorta    Liver cyst       Advance Care Planning:  ACP discussion was held with the patient during this visit. Patient does not have an advance directive, information provided.    Identification of Risk Factors:  Risk factors include: Advance Directive Discussion  Immunizations Discussed/Encouraged (specific immunizations; Tdap, Shingrix, COVID19, and RSV (Respiratory Syncytial Virus) )  Obesity/Overweight .    Review of Systems    Compared to one year ago, the patient feels his physical health is better.  Compared to one year ago, the patient feels his mental health is the same.    Objective     Physical Exam  Vitals reviewed.   Constitutional:       Appearance: Normal appearance.   HENT:      Head: Normocephalic and atraumatic.      Right Ear: Tympanic membrane, ear canal and external ear normal. There is no impacted cerumen.      Left Ear: Tympanic membrane, ear canal and external ear normal. There is no impacted cerumen.      Nose: Nose normal. No congestion.       Mouth/Throat:      Mouth: Mucous membranes are moist.      Pharynx: Oropharynx is clear.   Eyes:      Extraocular Movements: Extraocular movements intact.      Conjunctiva/sclera: Conjunctivae normal.   Cardiovascular:      Rate and Rhythm: Normal rate and regular rhythm.      Pulses:           Dorsalis pedis pulses are 2+ on the right side and 2+ on the left side.        Posterior tibial pulses are 2+ on the right side and 2+ on the left side.      Heart sounds: Normal heart sounds. No murmur heard.  Pulmonary:      Effort: Pulmonary effort is normal.      Breath sounds: Normal breath sounds.   Abdominal:      General: There is no distension.      Palpations: Abdomen is soft. There is no mass.      Tenderness: There is no abdominal tenderness.   Musculoskeletal:         General: No swelling.      Cervical back: Neck supple. No tenderness.      Right foot: Normal range of motion.      Left foot: Normal range of motion.   Feet:      Right foot:      Protective Sensation: 10 sites tested.  10 sites sensed.      Skin integrity: Skin integrity normal. No ulcer or skin breakdown.      Toenail Condition: Right toenails are normal.      Left foot:      Protective Sensation: 10 sites tested.  10 sites sensed.      Skin integrity: Skin integrity normal. No ulcer or skin breakdown.      Toenail Condition: Left toenails are normal.      Comments: Diabetic Foot Exam Performed and Monofilament Test Performed  Normal  Lymphadenopathy:      Cervical: No cervical adenopathy.   Skin:     General: Skin is warm and dry.   Neurological:      Mental Status: He is alert and oriented to person, place, and time. Mental status is at baseline.   Psychiatric:         Behavior: Behavior normal.         Thought Content: Thought content normal.          Procedures     Vitals:    05/20/24 0801   BP: 126/90   BP Location: Left arm   Patient Position: Sitting   Cuff Size: Large Adult   Pulse: 52   Temp: 98.2 °F (36.8 °C)   TempSrc: Temporal  "  Weight: 98 kg (216 lb)   Height: 180 cm (70.87\")              Assessment & Plan     1. Encounter for subsequent annual wellness visit (AWV) in Medicare patient    2. Type 2 diabetes mellitus with microalbuminuria, with long-term current use of insulin  Hemoglobin A1C   Date Value Ref Range Status   05/20/2024 8.7 (A) 4.5 - 5.7 % Final   02/20/2024 7.8 (A) 4.5 - 5.7 % Final   11/15/2023 7.3 (A) 4.5 - 5.7 % Final   02/04/2021 7.10 (H) 4.80 - 5.60 % Final   Diabetes is not well-controlled.  Goal is A1c below 7.  She will increase Lantus from 30 units to 36 units, he can increase further by 2 units as ordered.  Goal at least fasting blood sugar in the morning below 130.  Also encouraged lifestyle activities.  Will request ophthalmology records.  - POC Glycosylated Hemoglobin (Hb A1C)  - Lancets Misc. (Accu-Chek FastClix Lancet) kit; Use to check blood sugar up to three times a day  Dispense: 3 kit; Refill: 3  - OneTouch Verio test strip; Use to check blood sugar up to three times a day  Dispense: 100 each; Refill: 3  - Insulin Pen Needle 32G X 4 MM misc; Use 1 Units Daily.  Dispense: 100 each; Refill: 11  - Insulin Glargine (LANTUS SOLOSTAR) 100 UNIT/ML injection pen; Inject 36 Units under the skin into the appropriate area as directed Every Night. Every 5 d increase by 2 units until AM fasting sugar under 130. Max 42u.day    3. Essential hypertension  BP Readings from Last 3 Encounters:   05/20/24 126/90   02/22/24 140/80   02/20/24 150/88   Blood pressure still borderline, systolic improved.  Will continue to monitor and follow-up in 3 months.  - metoprolol succinate XL (TOPROL-XL) 50 MG 24 hr tablet; Take 1.5 tablets by mouth Daily.  Dispense: 135 tablet; Refill: 1    4. Abnormal CT of liver  5. Liver lesion  Suspect cyst, but will do ultrasound to characterize.  If it is something else than the cyst we might have to do additional studies, but hopefully this just proves to be a simple cyst.  - US Liver; " Future    6. Chronic cough  Symptoms resolved.  Suspect in setting of infection and component of allergies.    7. Obesity, Class I, BMI 30-34.9      Patient Self-Management and Personalized Health Advice  The patient has been provided with information about: diet and exercise and preventive services including:   Annual Wellness Visit (AWV).    Outpatient Encounter Medications as of 5/20/2024   Medication Sig Dispense Refill    aspirin 81 MG EC tablet Take 1 tablet by mouth Daily.      Continuous Blood Gluc  (FreeStyle Saeid 14 Day [place]) device 1 Device Take As Directed. 1 each 0    Continuous Blood Gluc Sensor (FreeStyle Saeid 3 Sensor) misc 1 Units Every 14 (Fourteen) Days. 5 each 3    Cyanocobalamin (B-12) 1000 MCG capsule Take  by mouth.      Insulin Glargine (LANTUS SOLOSTAR) 100 UNIT/ML injection pen Inject 36 Units under the skin into the appropriate area as directed Every Night. Every 5 d increase by 2 units until AM fasting sugar under 130. Max 42u.day      Insulin Pen Needle 32G X 4 MM misc Use 1 Units Daily. 100 each 11    Lancets Misc. (Accu-Chek FastClix Lancet) kit Use to check blood sugar up to three times a day 3 kit 3    lisinopril-hydrochlorothiazide (PRINZIDE,ZESTORETIC) 20-12.5 MG per tablet Take 1 tablet by mouth Daily. Stop Lisinopril 90 tablet 1    metFORMIN (GLUCOPHAGE) 1000 MG tablet Take 1 tablet by mouth 2 (Two) Times a Day With Meals. 180 tablet 1    metoprolol succinate XL (TOPROL-XL) 50 MG 24 hr tablet Take 1.5 tablets by mouth Daily. 135 tablet 1    montelukast (SINGULAIR) 10 MG tablet TAKE 1 TABLET DAILY 90 tablet 1    multivitamin with minerals tablet tablet Take 1 tablet by mouth Daily.      omeprazole (priLOSEC) 20 MG capsule TAKE 1 CAPSULE DAILY 90 capsule 1    OneTouch Verio test strip Use to check blood sugar up to three times a day 100 each 3    Ozempic, 0.25 or 0.5 MG/DOSE, 2 MG/3ML solution pen-injector INJECT 0.5 MG SUBCUTANEOUSLY  INTO THE APPROPRIATE AREA AS   DIRECTED ONCE WEEKLY 9 mL 3    rosuvastatin (CRESTOR) 5 MG tablet Take 1 tablet by mouth Daily. 90 tablet 1    [DISCONTINUED] Insulin Glargine (LANTUS SOLOSTAR) 100 UNIT/ML injection pen Inject 30 Units under the skin into the appropriate area as directed Every Night. 27 mL 1    [DISCONTINUED] Insulin Pen Needle 32G X 4 MM misc Use 1 Units Daily. 100 each 11    [DISCONTINUED] Lancets Misc. (Accu-Chek FastClix Lancet) kit Use to check blood sugar up to three times a day 1 kit 3    [DISCONTINUED] metoprolol succinate XL (TOPROL-XL) 50 MG 24 hr tablet Take 1 tablet by mouth Daily. 90 tablet 3    [DISCONTINUED] OneTouch Verio test strip Use to check blood sugar up to three times a day 100 each 3    [DISCONTINUED] promethazine-dextromethorphan (PROMETHAZINE-DM) 6.25-15 MG/5ML syrup Take 5 mL by mouth 4 (Four) Times a Day As Needed for Cough. (Patient not taking: Reported on 2/22/2024) 240 mL 0     No facility-administered encounter medications on file as of 5/20/2024.       Reviewed use of high risk medication in the elderly: yes  Reviewed for potential of harmful drug interactions in the elderly: yes    Follow Up:  Return in about 3 months (around 8/20/2024) for Recheck.     Future Appointments         Provider Department Center    5/20/2024 9:00 AM LAB [facility]    8/20/2024 8:00 AM Sav Mulligan MD [facility] INTERNAL MEDICINE [place]    3/5/2025 9:30 AM (Arrive by 9:00 AM) Melchor Waters MD [facility] CARDIOLOGY [place]              There are no Patient Instructions on file for this visit.    An After Visit Summary and PPPS with all of these plans were given to the patient.      Sav Mulligan MD

## 2024-05-20 NOTE — Clinical Note
Please get DM optho exam from Dr. Celestin at [facility] - done recently. Thanks

## 2024-06-11 DIAGNOSIS — E11.29 TYPE 2 DIABETES MELLITUS WITH MICROALBUMINURIA, WITH LONG-TERM CURRENT USE OF INSULIN: Chronic | ICD-10-CM

## 2024-06-11 DIAGNOSIS — R80.9 TYPE 2 DIABETES MELLITUS WITH MICROALBUMINURIA, WITH LONG-TERM CURRENT USE OF INSULIN: Chronic | ICD-10-CM

## 2024-06-11 DIAGNOSIS — Z79.4 TYPE 2 DIABETES MELLITUS WITH MICROALBUMINURIA, WITH LONG-TERM CURRENT USE OF INSULIN: Chronic | ICD-10-CM

## 2024-06-11 RX ORDER — INSULIN GLARGINE 100 [IU]/ML
INJECTION, SOLUTION SUBCUTANEOUS
Qty: 30 ML | Refills: 3 | Status: SHIPPED | OUTPATIENT
Start: 2024-06-11

## 2024-06-24 ENCOUNTER — HOSPITAL ENCOUNTER (OUTPATIENT)
Dept: ULTRASOUND IMAGING | Facility: HOSPITAL | Age: 69
Discharge: HOME | End: 2024-06-24
Admitting: STUDENT IN AN ORGANIZED HEALTH CARE EDUCATION/TRAINING PROGRAM
Payer: MEDICARE

## 2024-06-24 DIAGNOSIS — K76.9 LIVER LESION: ICD-10-CM

## 2024-06-24 DIAGNOSIS — R93.2 ABNORMAL CT OF LIVER: ICD-10-CM

## 2024-06-24 PROCEDURE — 76705 ECHO EXAM OF ABDOMEN: CPT

## 2024-07-17 ENCOUNTER — PATIENT MESSAGE (OUTPATIENT)
Dept: INTERNAL MEDICINE | Facility: CLINIC | Age: 69
End: 2024-07-17
Payer: MEDICARE

## 2024-07-17 ENCOUNTER — TELEPHONE (OUTPATIENT)
Dept: INTERNAL MEDICINE | Facility: CLINIC | Age: 69
End: 2024-07-17
Payer: MEDICARE

## 2024-07-17 DIAGNOSIS — Z79.4 TYPE 2 DIABETES MELLITUS WITH MICROALBUMINURIA, WITH LONG-TERM CURRENT USE OF INSULIN: Chronic | ICD-10-CM

## 2024-07-17 DIAGNOSIS — Z79.4 TYPE 2 DIABETES MELLITUS WITHOUT COMPLICATION, WITH LONG-TERM CURRENT USE OF INSULIN: ICD-10-CM

## 2024-07-17 DIAGNOSIS — J30.1 SEASONAL ALLERGIC RHINITIS DUE TO POLLEN: ICD-10-CM

## 2024-07-17 DIAGNOSIS — R80.9 TYPE 2 DIABETES MELLITUS WITH MICROALBUMINURIA, WITH LONG-TERM CURRENT USE OF INSULIN: Chronic | ICD-10-CM

## 2024-07-17 DIAGNOSIS — E11.29 TYPE 2 DIABETES MELLITUS WITH MICROALBUMINURIA, WITH LONG-TERM CURRENT USE OF INSULIN: Chronic | ICD-10-CM

## 2024-07-17 DIAGNOSIS — E11.9 TYPE 2 DIABETES MELLITUS WITHOUT COMPLICATION, WITH LONG-TERM CURRENT USE OF INSULIN: ICD-10-CM

## 2024-07-17 RX ORDER — INSULIN GLARGINE 100 [IU]/ML
30 INJECTION, SOLUTION SUBCUTANEOUS NIGHTLY
Qty: 30 ML | Refills: 1 | Status: SHIPPED | OUTPATIENT
Start: 2024-07-17

## 2024-07-17 RX ORDER — MONTELUKAST SODIUM 10 MG/1
10 TABLET ORAL DAILY
Qty: 90 TABLET | Refills: 1 | Status: SHIPPED | OUTPATIENT
Start: 2024-07-17

## 2024-07-17 RX ORDER — BLOOD-GLUCOSE SENSOR
1 EACH MISCELLANEOUS
Qty: 5 EACH | Refills: 3 | Status: SHIPPED | OUTPATIENT
Start: 2024-07-17

## 2024-07-17 RX ORDER — FLASH GLUCOSE SCANNING READER
1 EACH MISCELLANEOUS TAKE AS DIRECTED
Qty: 1 EACH | Refills: 0 | Status: CANCELLED | OUTPATIENT
Start: 2024-07-17

## 2024-07-17 RX ORDER — FLASH GLUCOSE SCANNING READER
1 EACH MISCELLANEOUS TAKE AS DIRECTED
Qty: 1 EACH | Refills: 1 | Status: SHIPPED | OUTPATIENT
Start: 2024-07-17

## 2024-07-17 NOTE — TELEPHONE ENCOUNTER
Rx Refill Note  Requested Prescriptions     Pending Prescriptions Disp Refills    Continuous Glucose  (FreeStyle Saeid 14 Day [place]) device 1 each 0     Sig: Use 1 Device Take As Directed.    montelukast (SINGULAIR) 10 MG tablet 90 tablet 1     Sig: Take 1 tablet by mouth Daily.    Insulin Glargine (Lantus SoloStar) 100 UNIT/ML injection pen 30 mL 3      Last office visit with prescribing clinician: 5/20/2024   Last telemedicine visit with prescribing clinician: Visit date not found   Next office visit with prescribing clinician: 8/20/2024                         Would you like a call back once the refill request has been completed: [] Yes [] No    If the office needs to give you a call back, can they leave a voicemail: [] Yes [] No    Radha Nam RN  07/17/24, 11:16 EDT

## 2024-07-17 NOTE — TELEPHONE ENCOUNTER
Pharmacy Name: [facility] [address] [place] [address] - [address] [facility] - [phone] [facility] [phone]          Pharmacy representative name: JOYCE    Pharmacy representative phone number: [phone]    What medication are you calling in regards to: DAVID BROWN     What question does the pharmacy have: THE PRESCRIPTION WAS SENT IN FOR THE KEVIN FIRST GENERATION READER BUT THAT IS NOT MADE ANYMORE THEY WOULD LIKE TO KNOW IF THAT CAN BE CHANGED TO THE KEVIN SENSOR 2 OR 3    Who is the provider that prescribed the medication: ISAACL

## 2024-07-17 NOTE — TELEPHONE ENCOUNTER
Done    1. Type 2 diabetes mellitus with microalbuminuria, with long-term current use of insulin  - Continuous Glucose Sensor (FreeStyle Saeid 3 Sensor) misc; Use 1 Units Every 14 (Fourteen) Days.  Dispense: 5 each; Refill: 3

## 2024-07-19 ENCOUNTER — PATIENT MESSAGE (OUTPATIENT)
Dept: INTERNAL MEDICINE | Facility: CLINIC | Age: 69
End: 2024-07-19
Payer: MEDICARE

## 2024-07-19 DIAGNOSIS — E11.65 TYPE 2 DIABETES MELLITUS WITH HYPERGLYCEMIA, WITH LONG-TERM CURRENT USE OF INSULIN: Primary | ICD-10-CM

## 2024-07-19 DIAGNOSIS — Z79.4 TYPE 2 DIABETES MELLITUS WITH HYPERGLYCEMIA, WITH LONG-TERM CURRENT USE OF INSULIN: Primary | ICD-10-CM

## 2024-07-26 DIAGNOSIS — E11.29 TYPE 2 DIABETES MELLITUS WITH MICROALBUMINURIA, WITH LONG-TERM CURRENT USE OF INSULIN: Chronic | ICD-10-CM

## 2024-07-26 DIAGNOSIS — R80.9 TYPE 2 DIABETES MELLITUS WITH MICROALBUMINURIA, WITH LONG-TERM CURRENT USE OF INSULIN: Chronic | ICD-10-CM

## 2024-07-26 DIAGNOSIS — Z79.4 TYPE 2 DIABETES MELLITUS WITH MICROALBUMINURIA, WITH LONG-TERM CURRENT USE OF INSULIN: Chronic | ICD-10-CM

## 2024-07-26 RX ORDER — BLOOD SUGAR DIAGNOSTIC
STRIP MISCELLANEOUS
Qty: 300 EACH | Refills: 3 | Status: SHIPPED | OUTPATIENT
Start: 2024-07-26

## 2024-08-04 DIAGNOSIS — E11.9 TYPE 2 DIABETES MELLITUS WITHOUT COMPLICATION, WITHOUT LONG-TERM CURRENT USE OF INSULIN: ICD-10-CM

## 2024-08-04 DIAGNOSIS — I10 ESSENTIAL HYPERTENSION: Chronic | ICD-10-CM

## 2024-08-05 RX ORDER — ROSUVASTATIN CALCIUM 5 MG/1
5 TABLET, COATED ORAL DAILY
Qty: 90 TABLET | Refills: 3 | Status: SHIPPED | OUTPATIENT
Start: 2024-08-05

## 2024-08-05 RX ORDER — LISINOPRIL AND HYDROCHLOROTHIAZIDE 20; 12.5 MG/1; MG/1
1 TABLET ORAL DAILY
Qty: 90 TABLET | Refills: 3 | Status: SHIPPED | OUTPATIENT
Start: 2024-08-05

## 2024-08-06 ENCOUNTER — PATIENT MESSAGE (OUTPATIENT)
Dept: INTERNAL MEDICINE | Facility: CLINIC | Age: 69
End: 2024-08-06
Payer: MEDICARE

## 2024-08-06 DIAGNOSIS — E11.29 TYPE 2 DIABETES MELLITUS WITH MICROALBUMINURIA, WITH LONG-TERM CURRENT USE OF INSULIN: Chronic | ICD-10-CM

## 2024-08-06 DIAGNOSIS — Z79.4 TYPE 2 DIABETES MELLITUS WITH MICROALBUMINURIA, WITH LONG-TERM CURRENT USE OF INSULIN: Chronic | ICD-10-CM

## 2024-08-06 DIAGNOSIS — R80.9 TYPE 2 DIABETES MELLITUS WITH MICROALBUMINURIA, WITH LONG-TERM CURRENT USE OF INSULIN: Chronic | ICD-10-CM

## 2024-08-07 RX ORDER — INSULIN GLARGINE 100 [IU]/ML
28 INJECTION, SOLUTION SUBCUTANEOUS 2 TIMES DAILY
Qty: 54 ML | Refills: 0 | Status: SHIPPED | OUTPATIENT
Start: 2024-08-07

## 2024-08-16 ENCOUNTER — PATIENT MESSAGE (OUTPATIENT)
Dept: INTERNAL MEDICINE | Facility: CLINIC | Age: 69
End: 2024-08-16
Payer: MEDICARE

## 2024-08-16 DIAGNOSIS — Z79.4 TYPE 2 DIABETES MELLITUS WITH MICROALBUMINURIA, WITH LONG-TERM CURRENT USE OF INSULIN: Chronic | ICD-10-CM

## 2024-08-16 DIAGNOSIS — R80.9 TYPE 2 DIABETES MELLITUS WITH MICROALBUMINURIA, WITH LONG-TERM CURRENT USE OF INSULIN: Chronic | ICD-10-CM

## 2024-08-16 DIAGNOSIS — E11.29 TYPE 2 DIABETES MELLITUS WITH MICROALBUMINURIA, WITH LONG-TERM CURRENT USE OF INSULIN: Chronic | ICD-10-CM

## 2024-08-22 ENCOUNTER — OFFICE VISIT (OUTPATIENT)
Dept: INTERNAL MEDICINE | Facility: CLINIC | Age: 69
End: 2024-08-22
Payer: MEDICARE

## 2024-08-22 VITALS
WEIGHT: 221.2 LBS | TEMPERATURE: 98.2 F | BODY MASS INDEX: 30.97 KG/M2 | SYSTOLIC BLOOD PRESSURE: 124 MMHG | HEART RATE: 56 BPM | DIASTOLIC BLOOD PRESSURE: 64 MMHG | HEIGHT: 71 IN

## 2024-08-22 DIAGNOSIS — Z79.4 TYPE 2 DIABETES MELLITUS WITH MICROALBUMINURIA, WITH LONG-TERM CURRENT USE OF INSULIN: Primary | ICD-10-CM

## 2024-08-22 DIAGNOSIS — E11.29 TYPE 2 DIABETES MELLITUS WITH MICROALBUMINURIA, WITH LONG-TERM CURRENT USE OF INSULIN: Primary | ICD-10-CM

## 2024-08-22 DIAGNOSIS — R80.9 TYPE 2 DIABETES MELLITUS WITH MICROALBUMINURIA, WITH LONG-TERM CURRENT USE OF INSULIN: Primary | ICD-10-CM

## 2024-08-22 LAB
EXPIRATION DATE: ABNORMAL
HBA1C MFR BLD: 8.6 % (ref 4.5–5.7)
Lab: ABNORMAL

## 2024-08-22 PROCEDURE — 3074F SYST BP LT 130 MM HG: CPT | Performed by: STUDENT IN AN ORGANIZED HEALTH CARE EDUCATION/TRAINING PROGRAM

## 2024-08-22 PROCEDURE — 3052F HG A1C>EQUAL 8.0%<EQUAL 9.0%: CPT | Performed by: STUDENT IN AN ORGANIZED HEALTH CARE EDUCATION/TRAINING PROGRAM

## 2024-08-22 PROCEDURE — 1126F AMNT PAIN NOTED NONE PRSNT: CPT | Performed by: STUDENT IN AN ORGANIZED HEALTH CARE EDUCATION/TRAINING PROGRAM

## 2024-08-22 PROCEDURE — 1160F RVW MEDS BY RX/DR IN RCRD: CPT | Performed by: STUDENT IN AN ORGANIZED HEALTH CARE EDUCATION/TRAINING PROGRAM

## 2024-08-22 PROCEDURE — 83036 HEMOGLOBIN GLYCOSYLATED A1C: CPT | Performed by: STUDENT IN AN ORGANIZED HEALTH CARE EDUCATION/TRAINING PROGRAM

## 2024-08-22 PROCEDURE — 3078F DIAST BP <80 MM HG: CPT | Performed by: STUDENT IN AN ORGANIZED HEALTH CARE EDUCATION/TRAINING PROGRAM

## 2024-08-22 PROCEDURE — 99214 OFFICE O/P EST MOD 30 MIN: CPT | Performed by: STUDENT IN AN ORGANIZED HEALTH CARE EDUCATION/TRAINING PROGRAM

## 2024-08-22 PROCEDURE — 1159F MED LIST DOCD IN RCRD: CPT | Performed by: STUDENT IN AN ORGANIZED HEALTH CARE EDUCATION/TRAINING PROGRAM

## 2024-08-22 NOTE — PROGRESS NOTES
"Chief Complaint  Rohan Evans Jr. is a 69 y.o. male presenting for Diabetes.     Retired  with the [employer]. . 2 children, daughter born 1979 and son born 1981.     Patient has a past medical history of T2DM without complications, hypertension, hyperlipidemia, PVCs, non-sustained VT (longest 8 beats, Holter 2/3/23), systolic murmur (TTE 2/3/23 EF 61%, mild AS, mild AR, mild TR, est cards Dr. Waters, ASA recommended), GERD and allergic rhinitis during spring/summer.    History of Present Illness  Patient is here for follow-up.  He is scheduled to see endocrinology in October.    He is now up to 30 units twice daily on his Lantus.  He did have an episode last week when playing golf where he felt shaky, his blood sugar was down to 78.  He continues to wear CGM and monitors his sugars.  Mostly his fasting blood sugars in the morning are around 115-120, confirmed by FSG.  His postprandial sugars around  150-170, rarely up to 200.    He has not taken Ozempic for a month due to side effects.  He is thinking about starting back on.    The following portions of the patient's history were reviewed and updated as appropriate: allergies, current medications, past family history, past medical history, past social history, past surgical history, and problem list.    Objective  /64 (BP Location: Left arm, Patient Position: Sitting, Cuff Size: Large Adult)   Pulse 56   Temp 98.2 °F (36.8 °C) (Temporal)   Ht 180 cm (70.87\")   Wt 100 kg (221 lb 3.2 oz)   BMI 30.97 kg/m²     Physical Exam  Constitutional:       Appearance: Normal appearance.   HENT:      Head: Normocephalic and atraumatic.   Eyes:      Extraocular Movements: Extraocular movements intact.      Conjunctiva/sclera: Conjunctivae normal.   Pulmonary:      Effort: Pulmonary effort is normal. No respiratory distress.   Musculoskeletal:      Cervical back: Normal range of motion and neck supple.   Skin:     General: Skin is warm and " dry.   Neurological:      Mental Status: He is alert and oriented to person, place, and time. Mental status is at baseline.   Psychiatric:         Behavior: Behavior normal.         Thought Content: Thought content normal.         Assessment/Plan   1. Type 2 diabetes mellitus with microalbuminuria, with long-term current use of insulin  Hemoglobin A1C   Date Value Ref Range Status   08/22/2024 8.6 (A) 4.5 - 5.7 % Final   05/20/2024 8.7 (A) 4.5 - 5.7 % Final   02/20/2024 7.8 (A) 4.5 - 5.7 % Final   02/04/2021 7.10 (H) 4.80 - 5.60 % Final   Unfortunately still not well-controlled diabetes based on A1c, goal is A1c below 7.  However his fingerstick/CGM levels are reassuring, so likely improving.  Given his borderline low blood sugars I have cautioned him when starting Ozempic again that blood sugars can trend down.  He might need to decrease his insulin  Lantus from 30 to 20 units twice daily, and potentially titrate up as tolerated.  In any case he has requested evaluation with endocrinology and we will see them in October.  With any concerns before that he will get back in touch, otherwise I will see him back in 3 months.  - POC Glycosylated Hemoglobin (Hb A1C)  - Insulin Pen Needle 32G X 4 MM misc; Use 1 Units 2 (Two) Times a Day.  Dispense: 100 each; Refill: 11    Counseled on recommendations for shingles vaccine, tetanus/Tdap vaccine with whooping cough, COVID booster and flu vaccine.    Return in about 13 weeks (around 11/21/2024).    Future Appointments         Provider Department Center    10/9/2024 8:30 AM (Arrive by 8:15 AM) Wilfrido Calvillo MD [facility] ENDOCRINOLOGY [place]    11/13/2024 11:00 AM Sav Mulligan MD [facility] INTERNAL MEDICINE [place]    3/5/2025 9:30 AM (Arrive by 9:00 AM) Melchor Waters MD [facility] CARDIOLOGY [place]    8/27/2025 10:30 AM Sav Mulligan MD [facility] INTERNAL MEDICINE [place]              Sav  MD Ese  Family Medicine  08/22/2024

## 2024-09-06 ENCOUNTER — PATIENT MESSAGE (OUTPATIENT)
Dept: INTERNAL MEDICINE | Facility: CLINIC | Age: 69
End: 2024-09-06
Payer: MEDICARE

## 2024-09-06 DIAGNOSIS — Z79.4 TYPE 2 DIABETES MELLITUS WITH MICROALBUMINURIA, WITH LONG-TERM CURRENT USE OF INSULIN: Primary | Chronic | ICD-10-CM

## 2024-09-06 DIAGNOSIS — R80.9 TYPE 2 DIABETES MELLITUS WITH MICROALBUMINURIA, WITH LONG-TERM CURRENT USE OF INSULIN: Primary | Chronic | ICD-10-CM

## 2024-09-06 DIAGNOSIS — E11.29 TYPE 2 DIABETES MELLITUS WITH MICROALBUMINURIA, WITH LONG-TERM CURRENT USE OF INSULIN: Primary | Chronic | ICD-10-CM

## 2024-10-10 ENCOUNTER — PATIENT MESSAGE (OUTPATIENT)
Dept: INTERNAL MEDICINE | Facility: CLINIC | Age: 69
End: 2024-10-10
Payer: MEDICARE

## 2024-10-10 DIAGNOSIS — Z79.4 TYPE 2 DIABETES MELLITUS WITH MICROALBUMINURIA, WITH LONG-TERM CURRENT USE OF INSULIN: Chronic | ICD-10-CM

## 2024-10-10 DIAGNOSIS — E11.29 TYPE 2 DIABETES MELLITUS WITH MICROALBUMINURIA, WITH LONG-TERM CURRENT USE OF INSULIN: Chronic | ICD-10-CM

## 2024-10-10 DIAGNOSIS — R80.9 TYPE 2 DIABETES MELLITUS WITH MICROALBUMINURIA, WITH LONG-TERM CURRENT USE OF INSULIN: Chronic | ICD-10-CM

## 2024-10-21 ENCOUNTER — TELEPHONE (OUTPATIENT)
Dept: INTERNAL MEDICINE | Facility: CLINIC | Age: 69
End: 2024-10-21
Payer: MEDICARE

## 2024-10-21 NOTE — TELEPHONE ENCOUNTER
Pharmacy is asking for clarification on patient's Mounjaro 2.5 mg/ 0.5 ml. Stating patient has history of taking the 2.5 mg should the dose be increased?

## 2024-10-21 NOTE — TELEPHONE ENCOUNTER
I called pharmacy spoke to Delfino the pharmacist she stated someone had already called and informed them to keep the 2.5 mg and it was sent out on 10/17/24

## 2024-11-02 DIAGNOSIS — I10 ESSENTIAL HYPERTENSION: Chronic | ICD-10-CM

## 2024-11-04 ENCOUNTER — PATIENT MESSAGE (OUTPATIENT)
Dept: INTERNAL MEDICINE | Facility: CLINIC | Age: 69
End: 2024-11-04
Payer: MEDICARE

## 2024-11-04 DIAGNOSIS — E11.29 TYPE 2 DIABETES MELLITUS WITH MICROALBUMINURIA, WITH LONG-TERM CURRENT USE OF INSULIN: Primary | Chronic | ICD-10-CM

## 2024-11-04 DIAGNOSIS — R80.9 TYPE 2 DIABETES MELLITUS WITH MICROALBUMINURIA, WITH LONG-TERM CURRENT USE OF INSULIN: Primary | Chronic | ICD-10-CM

## 2024-11-04 DIAGNOSIS — Z79.4 TYPE 2 DIABETES MELLITUS WITH MICROALBUMINURIA, WITH LONG-TERM CURRENT USE OF INSULIN: Primary | Chronic | ICD-10-CM

## 2024-11-04 RX ORDER — METOPROLOL SUCCINATE 50 MG/1
75 TABLET, EXTENDED RELEASE ORAL DAILY
Qty: 135 TABLET | Refills: 3 | Status: SHIPPED | OUTPATIENT
Start: 2024-11-04

## 2024-11-13 ENCOUNTER — OFFICE VISIT (OUTPATIENT)
Dept: INTERNAL MEDICINE | Facility: CLINIC | Age: 69
End: 2024-11-13
Payer: MEDICARE

## 2024-11-13 VITALS
DIASTOLIC BLOOD PRESSURE: 78 MMHG | TEMPERATURE: 97.8 F | HEIGHT: 71 IN | HEART RATE: 60 BPM | BODY MASS INDEX: 29.46 KG/M2 | WEIGHT: 210.4 LBS | SYSTOLIC BLOOD PRESSURE: 136 MMHG

## 2024-11-13 DIAGNOSIS — E11.29 TYPE 2 DIABETES MELLITUS WITH MICROALBUMINURIA, WITH LONG-TERM CURRENT USE OF INSULIN: Primary | Chronic | ICD-10-CM

## 2024-11-13 DIAGNOSIS — E66.3 OVERWEIGHT (BMI 25.0-29.9): ICD-10-CM

## 2024-11-13 DIAGNOSIS — E78.2 MIXED HYPERLIPIDEMIA: Chronic | ICD-10-CM

## 2024-11-13 DIAGNOSIS — Z79.4 TYPE 2 DIABETES MELLITUS WITH MICROALBUMINURIA, WITH LONG-TERM CURRENT USE OF INSULIN: Primary | Chronic | ICD-10-CM

## 2024-11-13 DIAGNOSIS — R80.9 TYPE 2 DIABETES MELLITUS WITH MICROALBUMINURIA, WITH LONG-TERM CURRENT USE OF INSULIN: Primary | Chronic | ICD-10-CM

## 2024-11-13 DIAGNOSIS — I10 ESSENTIAL HYPERTENSION: Chronic | ICD-10-CM

## 2024-11-13 PROCEDURE — 3075F SYST BP GE 130 - 139MM HG: CPT | Performed by: STUDENT IN AN ORGANIZED HEALTH CARE EDUCATION/TRAINING PROGRAM

## 2024-11-13 PROCEDURE — 1126F AMNT PAIN NOTED NONE PRSNT: CPT | Performed by: STUDENT IN AN ORGANIZED HEALTH CARE EDUCATION/TRAINING PROGRAM

## 2024-11-13 PROCEDURE — 1159F MED LIST DOCD IN RCRD: CPT | Performed by: STUDENT IN AN ORGANIZED HEALTH CARE EDUCATION/TRAINING PROGRAM

## 2024-11-13 PROCEDURE — 99214 OFFICE O/P EST MOD 30 MIN: CPT | Performed by: STUDENT IN AN ORGANIZED HEALTH CARE EDUCATION/TRAINING PROGRAM

## 2024-11-13 PROCEDURE — 3078F DIAST BP <80 MM HG: CPT | Performed by: STUDENT IN AN ORGANIZED HEALTH CARE EDUCATION/TRAINING PROGRAM

## 2024-11-13 PROCEDURE — 1160F RVW MEDS BY RX/DR IN RCRD: CPT | Performed by: STUDENT IN AN ORGANIZED HEALTH CARE EDUCATION/TRAINING PROGRAM

## 2024-11-13 PROCEDURE — 3052F HG A1C>EQUAL 8.0%<EQUAL 9.0%: CPT | Performed by: STUDENT IN AN ORGANIZED HEALTH CARE EDUCATION/TRAINING PROGRAM

## 2024-11-13 PROCEDURE — G2211 COMPLEX E/M VISIT ADD ON: HCPCS | Performed by: STUDENT IN AN ORGANIZED HEALTH CARE EDUCATION/TRAINING PROGRAM

## 2024-11-13 RX ORDER — INSULIN GLARGINE 100 [IU]/ML
20 INJECTION, SOLUTION SUBCUTANEOUS 2 TIMES DAILY
Status: SHIPPED
Start: 2024-11-13 | End: 2025-02-11

## 2024-11-13 NOTE — PROGRESS NOTES
"Chief Complaint  Rohan Evans Jr. is a 69 y.o. male presenting for Diabetes.     Retired  with the [employer]. . 2 children, daughter born 1979 and son born 1981.     Patient has a past medical history of T2DM without complications, hypertension, hyperlipidemia, PVCs, non-sustained VT (longest 8 beats, Holter 2/3/23), systolic murmur (TTE 2/3/23 EF 61%, mild AS, mild AR, mild TR, est cards Dr. Waters, ASA recommended), GERD and allergic rhinitis during spring/summer.    History of Present Illness  Patient is here for follow-up of his chronic health conditions.    He has started on Mounjaro 2.5 mg and is about to increase the dose to 5 mg.  No significant side effects.  He has lost 11 pounds in just under 3 months, so about 1 pound a week.  He continues to monitor his blood sugars with CGM, average last 30 days is 111.  He had 3 episodes of low blood sugars around 67-70, but was asymptomatic.  He has reduced his insulin glargine to 24 units twice daily.    He does keep an eye on his blood pressures at home, typically ranging 117-120/61-68    The following portions of the patient's history were reviewed and updated as appropriate: allergies, current medications, past family history, past medical history, past social history, past surgical history, and problem list.    Objective  /78 (BP Location: Left arm, Patient Position: Sitting, Cuff Size: Adult)   Pulse 60   Temp 97.8 °F (36.6 °C)   Ht 180 cm (70.87\")   Wt 95.4 kg (210 lb 6.4 oz)   BMI 29.46 kg/m²     Physical Exam  Constitutional:       Appearance: Normal appearance.   HENT:      Head: Normocephalic and atraumatic.   Eyes:      Extraocular Movements: Extraocular movements intact.      Conjunctiva/sclera: Conjunctivae normal.   Pulmonary:      Effort: Pulmonary effort is normal. No respiratory distress.   Musculoskeletal:      Cervical back: Normal range of motion and neck supple.   Neurological:      Mental Status: He is " alert and oriented to person, place, and time. Mental status is at baseline.   Psychiatric:         Behavior: Behavior normal.         Thought Content: Thought content normal.         Assessment/Plan   1. Type 2 diabetes mellitus with microalbuminuria, with long-term current use of insulin  Hemoglobin A1C   Date Value Ref Range Status   08/22/2024 8.6 (A) 4.5 - 5.7 % Final   05/20/2024 8.7 (A) 4.5 - 5.7 % Final   02/20/2024 7.8 (A) 4.5 - 5.7 % Final   02/04/2021 7.10 (H) 4.80 - 5.60 % Final   Likely improving glycemic control with average blood sugars 111.  He is tolerating the medication.  He is having some low blood sugars, so we will decrease the dose of Lantus from 24 units twice daily to 20 units twice daily.  He can increase the Mounjaro as planned to 5 mg weekly.  Potentially we can increase it further in 4 weeks if he tolerates medication well.  We typically ramped up every 4 weeks, so the next dose would be 7.5 mg, 4 weeks later potentially 10 mg.  If he has any significant side effects I would hold off on dose increase.  You will continue his CGM and if he has more borderline low blood sugars we will continue to decrease his insulin.  He will go for check of his A1c in about 10 days when is due for 3-month repeat.  - Insulin Glargine (Lantus SoloStar) 100 UNIT/ML injection pen; Inject 20 Units under the skin into the appropriate area as directed 2 (Two) Times a Day for 90 days. Increase by 2 units AM and 2u PM every 5 days until fasting blood sugar in the morning below 140.  Max 80 U BID  - Hemoglobin A1c; Future  - Comprehensive Metabolic Panel; Future  - Insulin Pen Needle 32G X 4 MM misc; Use 1 Units 2 (Two) Times a Day.  Dispense: 100 each; Refill: 11    2. Mixed hyperlipidemia  He is due for recheck of her lipids.  Consider higher dose of rosuvastatin given his diabetes, currently on 5 mg.  - Lipid Panel; Future    3. Essential hypertension  BP Readings from Last 3 Encounters:   11/13/24 136/78    08/22/24 124/64   05/20/24 126/90   Blood pressure borderline high in the office, but home blood pressures reassuring.  Will continue to monitor on current medications.    4. Overweight (BMI 25.0-29.9)  BMI is >= 25 and <30. (Overweight) The following options were offered after discussion;: pharmacological intervention options and continue on tirzepatide with plan to increase dose, he is currently losing weight at  an appropriate rate    Reviewed recommended vaccines including annual flu shot, shingles vaccine, COVID booster and also tetanus/Tdap vaccine with whooping cough.  Patient declines vaccines at this time.    Return in about 14 weeks (around 2/19/2025) for Recheck.    Future Appointments         Provider Department Center    1/17/2025 2:00 PM (Arrive by 1:45 PM) Wilfrido Calvillo MD [facility] ENDOCRINOLOGY [place]    2/17/2025 9:00 AM Sav Mulligan MD [facility] INTERNAL MEDICINE [place]    3/5/2025 9:30 AM (Arrive by 9:00 AM) Melchor Waters MD [facility] CARDIOLOGY [place]    8/27/2025 10:30 AM Sav Mulligan MD [facility] INTERNAL MEDICINE [place]              Sav Mulligan MD  Family Medicine  11/13/2024

## 2024-11-25 DIAGNOSIS — R80.9 TYPE 2 DIABETES MELLITUS WITH MICROALBUMINURIA, WITH LONG-TERM CURRENT USE OF INSULIN: Chronic | ICD-10-CM

## 2024-11-25 DIAGNOSIS — E11.29 TYPE 2 DIABETES MELLITUS WITH MICROALBUMINURIA, WITH LONG-TERM CURRENT USE OF INSULIN: Chronic | ICD-10-CM

## 2024-11-25 DIAGNOSIS — Z79.4 TYPE 2 DIABETES MELLITUS WITH MICROALBUMINURIA, WITH LONG-TERM CURRENT USE OF INSULIN: Chronic | ICD-10-CM

## 2024-11-30 DIAGNOSIS — Z79.4 TYPE 2 DIABETES MELLITUS WITH MICROALBUMINURIA, WITH LONG-TERM CURRENT USE OF INSULIN: Chronic | ICD-10-CM

## 2024-11-30 DIAGNOSIS — E11.29 TYPE 2 DIABETES MELLITUS WITH MICROALBUMINURIA, WITH LONG-TERM CURRENT USE OF INSULIN: Chronic | ICD-10-CM

## 2024-11-30 DIAGNOSIS — R80.9 TYPE 2 DIABETES MELLITUS WITH MICROALBUMINURIA, WITH LONG-TERM CURRENT USE OF INSULIN: Chronic | ICD-10-CM

## 2024-12-02 RX ORDER — INSULIN GLARGINE 100 [IU]/ML
INJECTION, SOLUTION SUBCUTANEOUS
Qty: 45 ML | Refills: 11 | Status: SHIPPED | OUTPATIENT
Start: 2024-12-02

## 2024-12-02 NOTE — TELEPHONE ENCOUNTER
Rx Refill Note  Requested Prescriptions     Pending Prescriptions Disp Refills    Lantus SoloStar 100 UNIT/ML injection pen [Pharmacy Med Name: Lantus SoloStar 100 UNIT/ML Subcutaneous Solution Pen-injector] 45 mL 11     Sig: INJECT SUBCUTANEOUSLY 28 UNITS AS DIRECTED TWICE DAILY. INCREASE BY 2 UNITS IN AM AND 2 UNITS IN PM EVERY 5 DAYS UNTIL FASTING BLOOD SUGAR IN THE MORNING BELOW 140. MAX DAILY: 80 UNITS TWICE DAILY      Last office visit with prescribing clinician: 11/13/2024   Last telemedicine visit with prescribing clinician: Visit date not found   Next office visit with prescribing clinician: 2/17/2025                         Would you like a call back once the refill request has been completed: [] Yes [] No    If the office needs to give you a call back, can they leave a voicemail: [] Yes [] No    Sandra Pak LPN  12/02/24, 09:12 EST

## 2024-12-10 ENCOUNTER — PATIENT MESSAGE (OUTPATIENT)
Dept: INTERNAL MEDICINE | Facility: CLINIC | Age: 69
End: 2024-12-10
Payer: MEDICARE

## 2024-12-10 DIAGNOSIS — E11.29 TYPE 2 DIABETES MELLITUS WITH MICROALBUMINURIA, WITH LONG-TERM CURRENT USE OF INSULIN: Chronic | ICD-10-CM

## 2024-12-10 DIAGNOSIS — R80.9 TYPE 2 DIABETES MELLITUS WITH MICROALBUMINURIA, WITH LONG-TERM CURRENT USE OF INSULIN: Chronic | ICD-10-CM

## 2024-12-10 DIAGNOSIS — Z79.4 TYPE 2 DIABETES MELLITUS WITH MICROALBUMINURIA, WITH LONG-TERM CURRENT USE OF INSULIN: Chronic | ICD-10-CM

## 2024-12-10 RX ORDER — TIRZEPATIDE 5 MG/.5ML
INJECTION, SOLUTION SUBCUTANEOUS
Qty: 2 ML | Refills: 11 | OUTPATIENT
Start: 2024-12-10

## 2024-12-22 DIAGNOSIS — J30.1 SEASONAL ALLERGIC RHINITIS DUE TO POLLEN: ICD-10-CM

## 2024-12-23 RX ORDER — MONTELUKAST SODIUM 10 MG/1
10 TABLET ORAL DAILY
Qty: 90 TABLET | Refills: 3 | Status: SHIPPED | OUTPATIENT
Start: 2024-12-23

## 2024-12-31 RX ORDER — TIRZEPATIDE 7.5 MG/.5ML
INJECTION, SOLUTION SUBCUTANEOUS
Qty: 2 ML | Refills: 11 | Status: SHIPPED | OUTPATIENT
Start: 2024-12-31

## 2025-01-07 ENCOUNTER — PATIENT MESSAGE (OUTPATIENT)
Dept: INTERNAL MEDICINE | Facility: CLINIC | Age: 70
End: 2025-01-07
Payer: MEDICARE

## 2025-01-17 ENCOUNTER — OFFICE VISIT (OUTPATIENT)
Dept: ENDOCRINOLOGY | Facility: CLINIC | Age: 70
End: 2025-01-17
Payer: MEDICARE

## 2025-01-17 VITALS
HEIGHT: 71 IN | OXYGEN SATURATION: 97 % | HEART RATE: 68 BPM | DIASTOLIC BLOOD PRESSURE: 72 MMHG | BODY MASS INDEX: 28 KG/M2 | SYSTOLIC BLOOD PRESSURE: 122 MMHG | WEIGHT: 200 LBS

## 2025-01-17 DIAGNOSIS — I10 ESSENTIAL HYPERTENSION: Chronic | ICD-10-CM

## 2025-01-17 DIAGNOSIS — R80.9 TYPE 2 DIABETES MELLITUS WITH MICROALBUMINURIA, WITH LONG-TERM CURRENT USE OF INSULIN: Chronic | ICD-10-CM

## 2025-01-17 DIAGNOSIS — Z79.4 TYPE 2 DIABETES MELLITUS WITH HYPERGLYCEMIA, WITH LONG-TERM CURRENT USE OF INSULIN: Primary | ICD-10-CM

## 2025-01-17 DIAGNOSIS — E11.29 TYPE 2 DIABETES MELLITUS WITH MICROALBUMINURIA, WITH LONG-TERM CURRENT USE OF INSULIN: Chronic | ICD-10-CM

## 2025-01-17 DIAGNOSIS — E78.2 MIXED HYPERLIPIDEMIA: Chronic | ICD-10-CM

## 2025-01-17 DIAGNOSIS — Z79.4 TYPE 2 DIABETES MELLITUS WITH MICROALBUMINURIA, WITH LONG-TERM CURRENT USE OF INSULIN: Chronic | ICD-10-CM

## 2025-01-17 DIAGNOSIS — E11.65 TYPE 2 DIABETES MELLITUS WITH HYPERGLYCEMIA, WITH LONG-TERM CURRENT USE OF INSULIN: Primary | ICD-10-CM

## 2025-01-17 LAB
ALBUMIN UR-MCNC: <1.2 MG/DL
CREAT UR-MCNC: 146.8 MG/DL
EXPIRATION DATE: ABNORMAL
EXPIRATION DATE: NORMAL
GLUCOSE BLDC GLUCOMTR-MCNC: 138 MG/DL (ref 70–130)
HBA1C MFR BLD: 5.4 % (ref 4.5–5.7)
Lab: ABNORMAL
Lab: NORMAL
MICROALBUMIN/CREAT UR: NORMAL MG/G{CREAT}

## 2025-01-17 PROCEDURE — 82043 UR ALBUMIN QUANTITATIVE: CPT | Performed by: INTERNAL MEDICINE

## 2025-01-17 PROCEDURE — 82570 ASSAY OF URINE CREATININE: CPT | Performed by: INTERNAL MEDICINE

## 2025-01-17 RX ORDER — DAPAGLIFLOZIN 10 MG/1
10 TABLET, FILM COATED ORAL DAILY
Qty: 90 TABLET | Refills: 3 | Status: SHIPPED | OUTPATIENT
Start: 2025-01-17

## 2025-01-17 NOTE — PROGRESS NOTES
"     Office Note      Date: 2025  Patient Name: Rohan Evans Jr.  MRN: [id number]  : 1955    Chief Complaint   Patient presents with    Diabetes     Type 2 diabetes        History of Present Illness:   Rohan Evans Jr. is a 69 y.o. male who presents for Diabetes type 2. Diagnosed in: . Treated in past with oral agents. He had trouble tolerating ozempic.  Current treatments: metformin, mounjaro, basal insulin. Number of insulin shots per day: 2. Checks blood sugar 288 times a day. Has low blood sugar: rare. Aspirin use: Yes. Statin use: Yes. ACE-I/ARB use: Yes. Last eye exam: 2024.    Last A1c was 8.6%.  Mounjaro was added around that time.  He is up to 10mg dose now.  He is tolerating this okay.  He has lost about 30 pounds with the mounjaro.  He has been able to decrease the insulin.  He has had DM education in the past.    Subjective      Diabetic Complications:  Eyes: No  Kidneys: Yes - microalbumin  Feet: No  Heart: No    Diet and Exercise:  Meals per day: 2  Minutes of exercise per week: 0 mins. But active.    Review of Systems:   Review of Systems   Constitutional: Negative.    Eyes: Negative.    Respiratory: Negative.     Cardiovascular: Negative.    Gastrointestinal: Negative.    Endocrine: Negative.    Genitourinary: Negative.    Musculoskeletal: Negative.    Skin: Negative.    Allergic/Immunologic: Negative.    Neurological: Negative.    Hematological: Negative.    Psychiatric/Behavioral: Negative.         The following portions of the patient's history were reviewed and updated as appropriate: allergies, current medications, past family history, past medical history, past social history, past surgical history, and problem list.    Objective     Visit Vitals  /72 (BP Location: Right arm, Patient Position: Sitting, Cuff Size: Adult)   Pulse 68   Ht 180.3 cm (71\")   Wt 90.7 kg (200 lb)   SpO2 97%   BMI 27.89 kg/m²       Physical Exam:  Physical Exam  Constitutional:  "      Appearance: Normal appearance.   HENT:      Head: Normocephalic and atraumatic.   Eyes:      Extraocular Movements: Extraocular movements intact.      Conjunctiva/sclera: Conjunctivae normal.   Neck:      Thyroid: No thyroid mass, thyromegaly or thyroid tenderness.   Cardiovascular:      Rate and Rhythm: Normal rate and regular rhythm.      Pulses: Normal pulses.           Dorsalis pedis pulses are 2+ on the right side and 2+ on the left side.        Posterior tibial pulses are 2+ on the right side and 2+ on the left side.      Heart sounds: Normal heart sounds.   Pulmonary:      Effort: Pulmonary effort is normal.      Breath sounds: Normal breath sounds.   Abdominal:      General: Bowel sounds are normal.      Palpations: Abdomen is soft.   Musculoskeletal:         General: Normal range of motion.      Cervical back: Normal range of motion and neck supple.   Feet:      Right foot:      Protective Sensation: 5 sites tested.  5 sites sensed.      Skin integrity: Skin integrity normal.      Toenail Condition: Right toenails are normal.      Left foot:      Protective Sensation: 5 sites tested.  5 sites sensed.      Skin integrity: Skin integrity normal.      Toenail Condition: Left toenails are normal.   Lymphadenopathy:      Cervical: No cervical adenopathy.   Skin:     General: Skin is warm and dry.   Neurological:      General: No focal deficit present.      Mental Status: He is alert.   Psychiatric:         Mood and Affect: Mood normal.         Behavior: Behavior normal.         Thought Content: Thought content normal.         Judgment: Judgment normal.         Labs:    HbA1c  Hemoglobin A1C   Date Value Ref Range Status   01/17/2025 5.4 4.5 - 5.7 % Final   02/04/2021 7.10 (H) 4.80 - 5.60 % Final   .    CMP  Lab Results   Component Value Date    GLUCOSE 171 (H) 05/20/2024    BUN 21 05/20/2024    CREATININE 1.02 05/20/2024    EGFRIFNONA 79 09/07/2021    BCR 20.6 05/20/2024    K 4.0 05/20/2024    CO2 27.4  "05/20/2024    CALCIUM 9.9 05/20/2024    AST 23 05/20/2024    ALT 33 05/20/2024        Lipid Panel  Lab Results   Component Value Date    HDL Cholesterol 53 11/15/2023    LDL Cholesterol  92 11/15/2023    LDL/HDL Ratio 1.72 11/15/2023    Triglycerides 84 11/15/2023        TSH  No results found for: \"TSH\", \"FREET4\"     Hemoglobin A1C  No components found for: \"HGBA1C\"     Microalbumin/Creatinine  No results found for: \"MALBCRERATI\"        Assessment / Plan      Assessment & Plan:  Diagnoses and all orders for this visit:    1. Type 2 diabetes mellitus with hyperglycemia, with long-term current use of insulin (Primary)  Assessment & Plan:  A1c looks great!  He has done very well since mounjaro was added.  He has been able to decrease the insulin dose significantly.  We discussed adding SGLT-2 inhibitor and trying to stop the insulin.  Potential s/e discussed.    Continue CGM.  Set up CGM for sharing today.      Orders:  -     POC Glycosylated Hemoglobin (Hb A1C)  -     POC Glucose, Blood  -     Microalbumin / Creatinine Urine Ratio - Urine, Clean Catch; Future    2. Essential hypertension  Assessment & Plan:  Hypertension is stable and controlled  Continue current treatment regimen.  Blood pressure will be reassessed in 6 months.      3. Mixed hyperlipidemia  Assessment & Plan:  Continue statin.   Plan to check lipids next visit.      4. Type 2 diabetes mellitus with microalbuminuria, with long-term current use of insulin  Assessment & Plan:  Check microalbumin today.  Continue ACE-I.  We discussed starting SGLT-2 inhibitor today.      Other orders  -     dapagliflozin Propanediol (Farxiga) 10 MG tablet; Take 10 mg by mouth Daily.  Dispense: 90 tablet; Refill: 3       Current Outpatient Medications   Medication Instructions    aspirin 81 mg, Oral, Daily    Continuous Glucose Sensor (FreeStyle Saeid 3 Sensor) misc 1 Units, Not Applicable, Every 14 Days    Cyanocobalamin (B-12) 1000 MCG capsule Take  by mouth.    " dapagliflozin Propanediol (FARXIGA) 10 mg, Oral, Daily    Lancets Misc. (Accu-Chek FastClix Lancet) kit Use to check blood sugar up to three times a day    lisinopril-hydrochlorothiazide (PRINZIDE,ZESTORETIC) 20-12.5 MG per tablet 1 tablet, Oral, Daily    metFORMIN (GLUCOPHAGE) 1,000 mg, Oral, 2 Times Daily With Meals    metoprolol succinate XL (TOPROL-XL) 75 mg, Oral, Daily    montelukast (SINGULAIR) 10 mg, Oral, Daily    multivitamin with minerals tablet tablet 1 tablet, Daily    omeprazole (priLOSEC) 20 MG capsule TAKE 1 CAPSULE DAILY    OneTouch Verio test strip USE TO CHECK BLOOD SUGAR UP TO 3 TIMES DAILY    rosuvastatin (CRESTOR) 5 mg, Oral, Daily    Tirzepatide 10 mg, Subcutaneous, Weekly      Return in about 6 months (around 7/17/2025) for Recheck with A1c, CMP, lipid, TSH, microalbumin.    Electronically signed by: Wilfrido Calvillo MD  01/17/2025

## 2025-01-17 NOTE — ASSESSMENT & PLAN NOTE
A1c looks great!  He has done very well since mounjaro was added.  He has been able to decrease the insulin dose significantly.  We discussed adding SGLT-2 inhibitor and trying to stop the insulin.  Potential s/e discussed.    Continue CGM.  Set up CGM for sharing today.

## 2025-01-27 ENCOUNTER — PATIENT MESSAGE (OUTPATIENT)
Dept: INTERNAL MEDICINE | Facility: CLINIC | Age: 70
End: 2025-01-27
Payer: MEDICARE

## 2025-01-27 DIAGNOSIS — E11.29 TYPE 2 DIABETES MELLITUS WITH MICROALBUMINURIA, WITH LONG-TERM CURRENT USE OF INSULIN: Chronic | ICD-10-CM

## 2025-01-27 DIAGNOSIS — R80.9 TYPE 2 DIABETES MELLITUS WITH MICROALBUMINURIA, WITH LONG-TERM CURRENT USE OF INSULIN: Chronic | ICD-10-CM

## 2025-01-27 DIAGNOSIS — Z79.4 TYPE 2 DIABETES MELLITUS WITH MICROALBUMINURIA, WITH LONG-TERM CURRENT USE OF INSULIN: Chronic | ICD-10-CM

## 2025-01-28 RX ORDER — ACYCLOVIR 800 MG/1
1 TABLET ORAL
Qty: 6 EACH | Refills: 3 | Status: SHIPPED | OUTPATIENT
Start: 2025-01-28

## 2025-02-03 RX ORDER — TIRZEPATIDE 10 MG/.5ML
INJECTION, SOLUTION SUBCUTANEOUS
Qty: 2 ML | Refills: 11 | Status: SHIPPED | OUTPATIENT
Start: 2025-02-03

## 2025-02-03 NOTE — TELEPHONE ENCOUNTER
Rx Refill Note  Requested Prescriptions     Pending Prescriptions Disp Refills    Mounjaro 10 MG/0.5ML solution auto-injector [Pharmacy Med Name: MOUNJARO PEN 10MG/0.5ML] 2 mL 11     Sig: INJECT THE CONTENTS OF ONE PEN  SUBCUTANEOUSLY WEEKLY AS  DIRECTED      Last office visit with prescribing clinician: 11/13/2024   Last telemedicine visit with prescribing clinician: Visit date not found   Next office visit with prescribing clinician: 2/17/2025                         Would you like a call back once the refill request has been completed: [] Yes [] No    If the office needs to give you a call back, can they leave a voicemail: [] Yes [] No    Sandra Pak LPN  02/03/25, 08:30 EST

## 2025-02-17 ENCOUNTER — OFFICE VISIT (OUTPATIENT)
Dept: INTERNAL MEDICINE | Facility: CLINIC | Age: 70
End: 2025-02-17
Payer: MEDICARE

## 2025-02-17 ENCOUNTER — LAB (OUTPATIENT)
Dept: LAB | Facility: HOSPITAL | Age: 70
End: 2025-02-17
Payer: MEDICARE

## 2025-02-17 VITALS
TEMPERATURE: 97.7 F | HEART RATE: 60 BPM | HEIGHT: 71 IN | SYSTOLIC BLOOD PRESSURE: 104 MMHG | DIASTOLIC BLOOD PRESSURE: 60 MMHG | BODY MASS INDEX: 27.1 KG/M2 | WEIGHT: 193.6 LBS

## 2025-02-17 DIAGNOSIS — E11.29 TYPE 2 DIABETES MELLITUS WITH DIABETIC MICROALBUMINURIA, WITHOUT LONG-TERM CURRENT USE OF INSULIN: Chronic | ICD-10-CM

## 2025-02-17 DIAGNOSIS — E78.2 MIXED HYPERLIPIDEMIA: Chronic | ICD-10-CM

## 2025-02-17 DIAGNOSIS — R80.9 TYPE 2 DIABETES MELLITUS WITH DIABETIC MICROALBUMINURIA, WITHOUT LONG-TERM CURRENT USE OF INSULIN: Chronic | ICD-10-CM

## 2025-02-17 DIAGNOSIS — E11.29 TYPE 2 DIABETES MELLITUS WITH DIABETIC MICROALBUMINURIA, WITHOUT LONG-TERM CURRENT USE OF INSULIN: Primary | Chronic | ICD-10-CM

## 2025-02-17 DIAGNOSIS — Z13.21 ENCOUNTER FOR VITAMIN DEFICIENCY SCREENING: ICD-10-CM

## 2025-02-17 DIAGNOSIS — I10 ESSENTIAL HYPERTENSION: Chronic | ICD-10-CM

## 2025-02-17 DIAGNOSIS — R80.9 TYPE 2 DIABETES MELLITUS WITH DIABETIC MICROALBUMINURIA, WITHOUT LONG-TERM CURRENT USE OF INSULIN: Primary | Chronic | ICD-10-CM

## 2025-02-17 LAB
ALBUMIN SERPL-MCNC: 4 G/DL (ref 3.5–5.2)
ALBUMIN/GLOB SERPL: 1.1 G/DL
ALP SERPL-CCNC: 41 U/L (ref 39–117)
ALT SERPL W P-5'-P-CCNC: 16 U/L (ref 1–41)
ANION GAP SERPL CALCULATED.3IONS-SCNC: 10.3 MMOL/L (ref 5–15)
AST SERPL-CCNC: 22 U/L (ref 1–40)
BILIRUB SERPL-MCNC: 0.4 MG/DL (ref 0–1.2)
BUN SERPL-MCNC: 20 MG/DL (ref 8–23)
BUN/CREAT SERPL: 17.1 (ref 7–25)
CALCIUM SPEC-SCNC: 10.3 MG/DL (ref 8.6–10.5)
CHLORIDE SERPL-SCNC: 104 MMOL/L (ref 98–107)
CHOLEST SERPL-MCNC: 159 MG/DL (ref 0–200)
CO2 SERPL-SCNC: 27.7 MMOL/L (ref 22–29)
CREAT SERPL-MCNC: 1.17 MG/DL (ref 0.76–1.27)
DEPRECATED RDW RBC AUTO: 44.1 FL (ref 37–54)
EGFRCR SERPLBLD CKD-EPI 2021: 66.6 ML/MIN/1.73
ERYTHROCYTE [DISTWIDTH] IN BLOOD BY AUTOMATED COUNT: 13.3 % (ref 12.3–15.4)
EXPIRATION DATE: NORMAL
GLOBULIN UR ELPH-MCNC: 3.5 GM/DL
GLUCOSE SERPL-MCNC: 134 MG/DL (ref 65–99)
HCT VFR BLD AUTO: 43.9 % (ref 37.5–51)
HDLC SERPL-MCNC: 47 MG/DL (ref 40–60)
HGB BLD-MCNC: 15.3 G/DL (ref 13–17.7)
LDLC SERPL CALC-MCNC: 90 MG/DL (ref 0–100)
LDLC/HDLC SERPL: 1.85 {RATIO}
Lab: NORMAL
MCH RBC QN AUTO: 31.3 PG (ref 26.6–33)
MCHC RBC AUTO-ENTMCNC: 34.9 G/DL (ref 31.5–35.7)
MCV RBC AUTO: 89.8 FL (ref 79–97)
PLATELET # BLD AUTO: 273 10*3/MM3 (ref 140–450)
PMV BLD AUTO: 11.2 FL (ref 6–12)
POC ALBUMIN, URINE: NORMAL MG/L
POC CREATININE, URINE: NORMAL MG/DL
POC URINE ALB/CREA RATIO: NORMAL
POTASSIUM SERPL-SCNC: 4.3 MMOL/L (ref 3.5–5.2)
PROT SERPL-MCNC: 7.5 G/DL (ref 6–8.5)
RBC # BLD AUTO: 4.89 10*6/MM3 (ref 4.14–5.8)
SODIUM SERPL-SCNC: 142 MMOL/L (ref 136–145)
TRIGL SERPL-MCNC: 126 MG/DL (ref 0–150)
TSH SERPL DL<=0.05 MIU/L-ACNC: 2.03 UIU/ML (ref 0.27–4.2)
VIT B12 BLD-MCNC: 565 PG/ML (ref 211–946)
VLDLC SERPL-MCNC: 22 MG/DL (ref 5–40)
WBC NRBC COR # BLD AUTO: 7.89 10*3/MM3 (ref 3.4–10.8)

## 2025-02-17 PROCEDURE — 1159F MED LIST DOCD IN RCRD: CPT | Performed by: STUDENT IN AN ORGANIZED HEALTH CARE EDUCATION/TRAINING PROGRAM

## 2025-02-17 PROCEDURE — 82607 VITAMIN B-12: CPT

## 2025-02-17 PROCEDURE — 3044F HG A1C LEVEL LT 7.0%: CPT | Performed by: STUDENT IN AN ORGANIZED HEALTH CARE EDUCATION/TRAINING PROGRAM

## 2025-02-17 PROCEDURE — 82570 ASSAY OF URINE CREATININE: CPT | Performed by: STUDENT IN AN ORGANIZED HEALTH CARE EDUCATION/TRAINING PROGRAM

## 2025-02-17 PROCEDURE — 99214 OFFICE O/P EST MOD 30 MIN: CPT | Performed by: STUDENT IN AN ORGANIZED HEALTH CARE EDUCATION/TRAINING PROGRAM

## 2025-02-17 PROCEDURE — 1126F AMNT PAIN NOTED NONE PRSNT: CPT | Performed by: STUDENT IN AN ORGANIZED HEALTH CARE EDUCATION/TRAINING PROGRAM

## 2025-02-17 PROCEDURE — 80053 COMPREHEN METABOLIC PANEL: CPT

## 2025-02-17 PROCEDURE — 84443 ASSAY THYROID STIM HORMONE: CPT

## 2025-02-17 PROCEDURE — 85027 COMPLETE CBC AUTOMATED: CPT

## 2025-02-17 PROCEDURE — 3074F SYST BP LT 130 MM HG: CPT | Performed by: STUDENT IN AN ORGANIZED HEALTH CARE EDUCATION/TRAINING PROGRAM

## 2025-02-17 PROCEDURE — 3078F DIAST BP <80 MM HG: CPT | Performed by: STUDENT IN AN ORGANIZED HEALTH CARE EDUCATION/TRAINING PROGRAM

## 2025-02-17 PROCEDURE — 80061 LIPID PANEL: CPT

## 2025-02-17 PROCEDURE — 1160F RVW MEDS BY RX/DR IN RCRD: CPT | Performed by: STUDENT IN AN ORGANIZED HEALTH CARE EDUCATION/TRAINING PROGRAM

## 2025-02-17 PROCEDURE — 82044 UR ALBUMIN SEMIQUANTITATIVE: CPT | Performed by: STUDENT IN AN ORGANIZED HEALTH CARE EDUCATION/TRAINING PROGRAM

## 2025-02-17 NOTE — PROGRESS NOTES
"Chief Complaint  Rohan Evans Jr. is a 69 y.o. male presenting for Diabetes.     Retired  with the [employer]. . 2 children, daughter born 1979 and son born 1981.     Patient has a past medical history of T2DM without complications, hypertension, hyperlipidemia, PVCs, non-sustained VT (longest 8 beats, Holter 2/3/23), systolic murmur (TTE 2/3/23 EF 61%, mild AS, mild AR, mild TR, est cards Dr. Waters, ASA recommended), GERD and allergic rhinitis during spring/summer.    History of Present Illness  Patient is here for follow-up.    He has establish care with endocrinology for his diabetes.  They discontinued his insulin now, and they started him on Farxiga.    Patient tells me since increasing Mounjaro from 7.5 mg to 10 mg he does get more weak at times.  For that reason he has transitioned to taking the Mounjaro every 2 weeks.  He is amenable to going down to the 7.5 mg dose, which he tolerated well.  He is fasting for blood work today.    The following portions of the patient's history were reviewed and updated as appropriate: allergies, current medications, past family history, past medical history, past social history, past surgical history, and problem list.    Objective  /60 (BP Location: Left arm, Patient Position: Sitting, Cuff Size: Large Adult)   Pulse 60   Temp 97.7 °F (36.5 °C) (Temporal)   Ht 180.3 cm (70.98\")   Wt 87.8 kg (193 lb 9.6 oz)   BMI 27.01 kg/m²     Physical Exam  Constitutional:       Appearance: Normal appearance.   HENT:      Head: Normocephalic and atraumatic.   Eyes:      Extraocular Movements: Extraocular movements intact.      Conjunctiva/sclera: Conjunctivae normal.   Pulmonary:      Effort: Pulmonary effort is normal. No respiratory distress.   Musculoskeletal:      Cervical back: Neck supple.   Skin:     General: Skin is warm and dry.   Neurological:      Mental Status: He is alert and oriented to person, place, and time. Mental status is at " baseline.   Psychiatric:         Behavior: Behavior normal.         Thought Content: Thought content normal.         Assessment/Plan   1. Type 2 diabetes mellitus with diabetic microalbuminuria, without long-term current use of insulin  Hemoglobin A1C   Date Value Ref Range Status   01/17/2025 5.4 4.5 - 5.7 % Final   08/22/2024 8.6 (A) 4.5 - 5.7 % Final   05/20/2024 8.7 (A) 4.5 - 5.7 % Final   02/04/2021 7.10 (H) 4.80 - 5.60 % Final   Good glycemic control.  Will reduce the dose of Mounjaro from 10 mg weekly to 7.5 mg weekly, recommend taking that shot every week.  If he still has side effects with weakness I would consider decreasing further to 5 mg weekly.  - Comprehensive Metabolic Panel; Future  - Tirzepatide 7.5 MG/0.5ML solution auto-injector; Inject 7.5 mg under the skin into the appropriate area as directed 1 (One) Time Per Week.  Dispense: 2 mL; Refill: 2  - Albumin/Creatinine Ratio Urine    2. Essential hypertension  BP Readings from Last 3 Encounters:   02/17/25 104/60   01/17/25 122/72   11/13/24 136/78   Blood pressure well-controlled and improved.  Continue current medications.  Consider decreasing blood pressure medication if he remains borderline low on blood pressure.  - TSH Rfx On Abnormal To Free T4; Future  - CBC (No Diff); Future    3. Mixed hyperlipidemia  Continue on rosuvastatin 5 mg.  Will recheck lipids fasting today.  - Lipid Panel; Future    4. Encounter for vitamin deficiency screening  Patient is on vitamin B-12 supplement.  Will check level  - Vitamin B12; Future      Return in about 3 months (around 5/20/2025) for Medicare Wellness.    Future Appointments         Provider Department Center    3/5/2025 9:30 AM (Arrive by 9:00 AM) Melchor Waters MD [facility] CARDIOLOGY [place]    5/23/2025 8:00 AM Sav Mulligan MD [facility] INTERNAL MEDICINE [place]    7/29/2025 10:30 AM (Arrive by 10:15 AM) Wilfrido Calvillo MD [facility]  ENDOCRINOLOGY [place] Mulligan MD  Family Medicine  02/17/2025

## 2025-02-17 NOTE — Clinical Note
Sandra we just discussed the microalbumin, this was not a patient that had done it at the lab and unable to calculate.

## 2025-03-05 ENCOUNTER — OFFICE VISIT (OUTPATIENT)
Dept: CARDIOLOGY | Facility: CLINIC | Age: 70
End: 2025-03-05
Payer: MEDICARE

## 2025-03-05 ENCOUNTER — PATIENT MESSAGE (OUTPATIENT)
Dept: INTERNAL MEDICINE | Facility: CLINIC | Age: 70
End: 2025-03-05
Payer: MEDICARE

## 2025-03-05 VITALS
OXYGEN SATURATION: 97 % | SYSTOLIC BLOOD PRESSURE: 112 MMHG | HEART RATE: 60 BPM | DIASTOLIC BLOOD PRESSURE: 62 MMHG | WEIGHT: 192 LBS | HEIGHT: 71 IN | BODY MASS INDEX: 26.88 KG/M2

## 2025-03-05 DIAGNOSIS — I49.3 PVC'S (PREMATURE VENTRICULAR CONTRACTIONS): ICD-10-CM

## 2025-03-05 DIAGNOSIS — I35.0 AORTIC STENOSIS, MILD: Primary | ICD-10-CM

## 2025-03-05 DIAGNOSIS — I10 PRIMARY HYPERTENSION: ICD-10-CM

## 2025-03-05 DIAGNOSIS — I25.10 CORONARY ARTERY CALCIFICATION SEEN ON CT SCAN: ICD-10-CM

## 2025-03-05 NOTE — PROGRESS NOTES
[facility] CARDIOLOGY    Return Patient Office Visit    Patient Name: Rohan Evans Jr.  : 1955   MRN: [id number]   Care Team: Patient Care Team:  Sav Mulligan MD as PCP - General (Family Medicine)  Melchor Waters MD as Cardiologist (Cardiology)  Flores Larkin PA-C as Physician Assistant (Physician Assistant)  Sav Mulligan MD as Consulting Physician (Family Medicine)  Wilfrido Calvillo MD as Consulting Physician (Endocrinology)    Chief Complaint   Patient presents with    Aortic stenosis, mild     HPI: Rohan Evans Jr. is a 69 y.o. male with a history of diabetes, hypertension, hyperlipidemia, GERD, seasonal allergies who presents today for routine follow-up of frequent non-sustained VT and mild AS.  Since his last visit in February of last year he reports feeling well.  He does not have any chest pain, palpitations, or exertional dyspnea.  Since last year he has continued on a GLP-1 and has been able to come off of insulin for his diabetes    Subjective   Review of Systems   Constitutional:  Negative for activity change.   Respiratory:  Negative for chest tightness and shortness of breath.    Cardiovascular:  Negative for chest pain and palpitations.     Past Medical History:   Diagnosis Date    Colon polyp     Drug-induced erectile dysfunction 2021    Increase lisinopril 20->40 mg    Elevated liver enzymes 2021    Essential hypertension     GERD (gastroesophageal reflux disease)     Heart murmur     History of cardiac murmur     Since young age. Done TTE and stress tests. Normal per paitient    Mixed hyperlipidemia     PVC's (premature ventricular contractions) 2023    Seasonal allergic rhinitis     Systolic murmur     Since young age per paitient. TTE 2/3/23 EF 61%, mild AS, mild AR, mild TR,    Type 2 diabetes mellitus without complication, without long-term current use of insulin      Tobacco Use: Medium Risk  (3/5/2025)    Patient History     Smoking Tobacco Use: Former     Smokeless Tobacco Use: Never     Passive Exposure: Not on file     Allergies   Allergen Reactions    Penicillins Swelling     High temp and red blotches    Lipitor [Atorvastatin] Other (See Comments)     Either muscle or liver lab abnormality        Current Outpatient Medications:     aspirin 81 MG EC tablet, Take 1 tablet by mouth Daily., Disp: , Rfl:     Continuous Glucose Sensor (FreeStyle Saeid 3 Sensor) misc, Use 1 Units Every 14 (Fourteen) Days., Disp: 6 each, Rfl: 3    Cyanocobalamin (B-12) 1000 MCG capsule, Take  by mouth., Disp: , Rfl:     dapagliflozin Propanediol (Farxiga) 10 MG tablet, Take 10 mg by mouth Daily., Disp: 90 tablet, Rfl: 3    Lancets Misc. (Accu-Chek FastClix Lancet) kit, Use to check blood sugar up to three times a day, Disp: 3 kit, Rfl: 3    lisinopril-hydrochlorothiazide (PRINZIDE,ZESTORETIC) 20-12.5 MG per tablet, TAKE 1 TABLET BY MOUTH DAILY, Disp: 90 tablet, Rfl: 3    metFORMIN (GLUCOPHAGE) 1000 MG tablet, TAKE 1 TABLET BY MOUTH TWICE  DAILY WITH MEALS, Disp: 180 tablet, Rfl: 3    metoprolol succinate XL (TOPROL-XL) 50 MG 24 hr tablet, TAKE 1 AND 1/2 TABLETS BY MOUTH  DAILY, Disp: 135 tablet, Rfl: 3    montelukast (SINGULAIR) 10 MG tablet, TAKE 1 TABLET BY MOUTH DAILY, Disp: 90 tablet, Rfl: 3    multivitamin with minerals tablet tablet, Take 1 tablet by mouth Daily., Disp: , Rfl:     omeprazole (priLOSEC) 20 MG capsule, TAKE 1 CAPSULE DAILY, Disp: 90 capsule, Rfl: 1    OneTouch Verio test strip, USE TO CHECK BLOOD SUGAR UP TO 3 TIMES DAILY, Disp: 300 each, Rfl: 3    rosuvastatin (CRESTOR) 5 MG tablet, TAKE 1 TABLET BY MOUTH DAILY, Disp: 90 tablet, Rfl: 3    Tirzepatide 7.5 MG/0.5ML solution auto-injector, Inject 7.5 mg under the skin into the appropriate area as directed 1 (One) Time Per Week., Disp: 2 mL, Rfl: 2    Objective     Vitals:    03/05/25 0908   BP: 112/62   BP Location: Right arm   Patient Position: Sitting  "  Cuff Size: Adult   Pulse: 60   SpO2: 97%   Weight: 87.1 kg (192 lb)   Height: 180.3 cm (71\")   Body mass index is 26.78 kg/m².  Gen: well developed, lying in bed, comfortable appearing  HEENT: MMM, sclera anicteric, conjunctiva normal  CV: regular rate, regular rhythm, III/VI MILKA at RUSB with radiation to carotids, normal S1, S2. 2+ radial and DP pulses  Pulm: RA, normal work of breathing, no wheezes, rales, rhonchi  Abd: soft, non-tender, non-distended  Ext: normal bulk for age, normal tone, no dependent edema  Neuro: alert, oriented, face symmetrical, moving all extremities well  Psych: normal mood, appropriate affect     Most recent PCP note, imaging tests, and labs reviewed.    Labs:    Lab Results   Component Value Date    GLUCOSE 134 (H) 02/17/2025    BUN 20 02/17/2025    CREATININE 1.17 02/17/2025    EGFRIFNONA 79 09/07/2021    BCR 17.1 02/17/2025    K 4.3 02/17/2025    CO2 27.7 02/17/2025    CALCIUM 10.3 02/17/2025    ALBUMIN 4.0 02/17/2025    AST 22 02/17/2025    ALT 16 02/17/2025     Lab Results   Component Value Date    HGBA1C 5.4 01/17/2025     Lab Results   Component Value Date    CHOL 159 02/17/2025    TRIG 126 02/17/2025    HDL 47 02/17/2025    LDL 90 02/17/2025     Exercise stress echocardiogram March 22, 2017  1.  Normal exercise stress echocardiogram.  LVEF at peak exercise greater than 65%  2.  Normal resting 2D echocardiogram, LVEF 60%  3.  No obvious evidence of ischemia noted  4.  Hypertensive response noted with exercise    Echocardiogram January 2019  1. left ventricle dimensions are normal, LVH with basal septal thickening.  Wall motion normal, LVEF 68%  2.  Grade 1 diastolic dysfunction suggestive of impaired relaxation  3.  Mild aortic stenosis with mild aortic regurgitation.  Mean gradient 17 mmHg, peak velocity 2.8 m/s, MICHELET 1.4 cm²  4.  Mild tricuspid regurgitation, RVSP 31 mmHg    2/23/23 - Transthoracic Echo Complete    Left ventricular systolic function is normal. Calculated left " ventricular EF = 61.2% Normal left ventricular cavity size and wall thickness noted. All left ventricular wall segments contract normally. Left ventricular diastolic function is consistent with (grade I) impaired relaxation.    Normal right ventricular cavity size, wall thickness and systolic function    Is moderate calcification of the aortic valve with moderate restriction of valve opening.    Mild aortic valve regurgitation is present. Mild aortic valve stenosis is present.    Ao mean PG 14.2 mmHg    Ao pk helio 255.4 cm/sec    MICHELET(I,D) 1.8 cm2    9/29/23 - Stress Test with MPI    Patient denied any chest discomfort/pain, or any other symptoms during exercise.    Expected exercise duration 7:10, actual 7:40; NIGEL (-6).    Inferolateral ST depression noted, up to 2mm and horizonal with exertion. 0.5mm downsloping ST depressions in recovery.    Moderate risk for ischemic heart disease based on [facility] Treadmill Score.    Findings consistent with an abnormal ECG stress test.    Myocardial perfusion imaging indicates a normal myocardial perfusion study with no evidence of ischemia.    Left ventricular ejection fraction is normal (Calculated EF = 66%).    Impressions are consistent with a low risk study based on myocardial perfusion imaging alone. Review of CT attenuation correction images shows moderate CAC in the LAD as well as a heavily calcified AV.    There is no prior study available for comparison.    EKG December 5, 2018  Sinus bradycardia heart rate 54, 1 PVC    1/25 - 1/27/23 - [employer] Wearable Heart Monitor  An abnormal monitor study. Frequent episodes (400) of nonsustained VT, longest 8 beats  Patient trigger associated with VT episode     Procedures    Advance Care Planning   ACP discussion was held with the patient during this visit. Patient does not have an advance directive, information provided.       Assessment & Plan       ICD-10-CM ICD-9-CM   1. Aortic stenosis, mild  I35.0 424.1   2. Primary hypertension   I10 401.9   3. Coronary artery calcification seen on CT scan  I25.10 414.00   4. PVC's (premature ventricular contractions)  I49.3 427.69       History of mild AS, mild AR, mild TR   - Peak velocity and gradients was stable on echocardiogram 2023   - Plan to repeat at next visit     PVCs  Frequent Non-sustained VT episodes   - Asymptomatic, continue beta-blocker   - stress negative for ischemia Sept 2023    Primary prevention of ASCVD  Coronary artery calcium, preclinical atherosclerosis   - Elevated 10-year risk score, calcium on CT stress images   - aspirin   - Continue rosuvastatin, tolerating low-dose well with LDL now less than 100    Hypertension   - controlled     Return in about 1 year (around 3/5/2026).    VALENTE Waters MD, MS  03/05/25    [facility] Card[facility]  [address] [address] [phone]

## 2025-03-06 ENCOUNTER — TELEPHONE (OUTPATIENT)
Dept: INTERNAL MEDICINE | Facility: CLINIC | Age: 70
End: 2025-03-06
Payer: MEDICARE

## 2025-03-06 NOTE — TELEPHONE ENCOUNTER
Called and spoke to patient  and was advised that everything has been squared away with his prior authorization. I told him to give me a call if he has any issues. He verbally understood.

## 2025-03-11 ENCOUNTER — TELEPHONE (OUTPATIENT)
Dept: INTERNAL MEDICINE | Facility: CLINIC | Age: 70
End: 2025-03-11
Payer: MEDICARE

## 2025-03-11 NOTE — TELEPHONE ENCOUNTER
Advised patient via my chart that I called his insurance company to obtain a prior authorization on the medication Mounjaro . I spoke with Anva  from the [facility]  tel# [phone] , dept who handles the prior authorization and  she said he does not need a prior authorization for the Mounjaro because it is already on his formulary of drugs so he should be able to fill this prescription with  no problem. The Ref number  is # [id number].

## 2025-05-06 DIAGNOSIS — R80.9 TYPE 2 DIABETES MELLITUS WITH DIABETIC MICROALBUMINURIA, WITHOUT LONG-TERM CURRENT USE OF INSULIN: Chronic | ICD-10-CM

## 2025-05-06 DIAGNOSIS — E11.29 TYPE 2 DIABETES MELLITUS WITH DIABETIC MICROALBUMINURIA, WITHOUT LONG-TERM CURRENT USE OF INSULIN: Chronic | ICD-10-CM

## 2025-05-06 RX ORDER — TIRZEPATIDE 7.5 MG/.5ML
INJECTION, SOLUTION SUBCUTANEOUS
Qty: 6 ML | Refills: 3 | Status: SHIPPED | OUTPATIENT
Start: 2025-05-06

## 2025-05-06 NOTE — TELEPHONE ENCOUNTER
Rx Refill Note  Requested Prescriptions     Pending Prescriptions Disp Refills    Mounjaro 7.5 MG/0.5ML solution auto-injector [Pharmacy Med Name: MOUNJARO PEN 7.5MG/0.5ML] 6 mL 3     Sig: INJECT THE CONTENTS OF ONE PEN  SUBCUTANEOUSLY WEEKLY AS  DIRECTED INTO THE APPROPRIATE  AREAS      Last office visit with prescribing clinician: 2/17/2025   Last telemedicine visit with prescribing clinician: Visit date not found   Next office visit with prescribing clinician: 5/23/2025                         Would you like a call back once the refill request has been completed: [] Yes [] No    If the office needs to give you a call back, can they leave a voicemail: [] Yes [] No    Sandra Pak LPN  05/06/25, 08:15 EDT

## 2025-05-16 NOTE — TELEPHONE ENCOUNTER
From: Rohan Evans  To: Sav Mulligan MD  Sent: 11/3/2021 7:11 PM EDT  Subject: Omeprazole , 20mg capsule.     Dr. Mulligan,  I am running out of these capsules, and [facility] mail order doesn’t show this as one of my medications. Can you please call this in for me? I think what I have is from before I changed to you.    98.9

## 2025-05-23 ENCOUNTER — OFFICE VISIT (OUTPATIENT)
Dept: INTERNAL MEDICINE | Facility: CLINIC | Age: 70
End: 2025-05-23
Payer: MEDICARE

## 2025-05-23 VITALS
BODY MASS INDEX: 25.83 KG/M2 | SYSTOLIC BLOOD PRESSURE: 130 MMHG | WEIGHT: 180.4 LBS | HEART RATE: 60 BPM | TEMPERATURE: 98 F | HEIGHT: 70 IN | DIASTOLIC BLOOD PRESSURE: 80 MMHG

## 2025-05-23 DIAGNOSIS — E78.2 MIXED HYPERLIPIDEMIA: Chronic | ICD-10-CM

## 2025-05-23 DIAGNOSIS — E11.29 TYPE 2 DIABETES MELLITUS WITH DIABETIC MICROALBUMINURIA, WITHOUT LONG-TERM CURRENT USE OF INSULIN: Chronic | ICD-10-CM

## 2025-05-23 DIAGNOSIS — E66.3 OVERWEIGHT (BMI 25.0-29.9): ICD-10-CM

## 2025-05-23 DIAGNOSIS — Z00.00 ENCOUNTER FOR SUBSEQUENT ANNUAL WELLNESS VISIT (AWV) IN MEDICARE PATIENT: Primary | ICD-10-CM

## 2025-05-23 DIAGNOSIS — Z00.00 ANNUAL PHYSICAL EXAM: ICD-10-CM

## 2025-05-23 DIAGNOSIS — I10 ESSENTIAL HYPERTENSION: Chronic | ICD-10-CM

## 2025-05-23 DIAGNOSIS — R80.9 TYPE 2 DIABETES MELLITUS WITH DIABETIC MICROALBUMINURIA, WITHOUT LONG-TERM CURRENT USE OF INSULIN: Chronic | ICD-10-CM

## 2025-05-23 LAB
EXPIRATION DATE: ABNORMAL
HBA1C MFR BLD: 6.3 % (ref 4.5–5.7)
Lab: ABNORMAL

## 2025-05-23 NOTE — PROGRESS NOTES
Subjective   The ABCs of the Annual Wellness Visit  Medicare Wellness Visit    Retired  with the [employer]. . 2 children, daughter born 1979 and son born 1981.     Patient has a past medical history of T2DM without complications, hypertension, hyperlipidemia, PVCs, non-sustained VT (longest 8 beats, Holter 2/3/23), systolic murmur (TTE 2/3/23 EF 61%, mild AS, mild AR, mild TR, est cards Dr. Waters, ASA recommended), GERD and allergic rhinitis during spring/summer.    Rohan Evans Jr. is a 69 y.o. patient who presents for a Medicare Wellness Visit.    The following portions of the patient's history were reviewed and   updated as appropriate: allergies, current medications, past family history, past medical history, past social history, past surgical history, and problem list.    Compared to one year ago, the patient's physical   health is better.  Compared to one year ago, the patient's mental   health is the same.    Recent Hospitalizations:  He was not admitted to the hospital during the last year.     Current Medical Providers:  Patient Care Team:  Sav Mulligan MD as PCP - General (Family Medicine)  Melchor Waters MD as Cardiologist (Cardiology)  Flores Larkin PA-C as Physician Assistant (Physician Assistant)  Sav Mulligan MD as Consulting Physician (Family Medicine)  Wilfrido Calvillo MD as Consulting Physician (Endocrinology)    Outpatient Medications Prior to Visit   Medication Sig Dispense Refill    aspirin 81 MG EC tablet Take 1 tablet by mouth Daily.      Continuous Glucose Sensor (FreeStyle Saeid 3 Sensor) misc Use 1 Units Every 14 (Fourteen) Days. 6 each 3    Cyanocobalamin (B-12) 1000 MCG capsule Take  by mouth.      dapagliflozin Propanediol (Farxiga) 10 MG tablet Take 10 mg by mouth Daily. 90 tablet 3    Lancets Misc. (Accu-Chek FastClix Lancet) kit Use to check blood sugar up to three times a day 3 kit 3    lisinopril-hydrochlorothiazide  (PRINZIDE,ZESTORETIC) 20-12.5 MG per tablet TAKE 1 TABLET BY MOUTH DAILY 90 tablet 3    metFORMIN (GLUCOPHAGE) 1000 MG tablet TAKE 1 TABLET BY MOUTH TWICE  DAILY WITH MEALS 180 tablet 3    metoprolol succinate XL (TOPROL-XL) 50 MG 24 hr tablet TAKE 1 AND 1/2 TABLETS BY MOUTH  DAILY 135 tablet 3    montelukast (SINGULAIR) 10 MG tablet TAKE 1 TABLET BY MOUTH DAILY 90 tablet 3    Mounjaro 7.5 MG/0.5ML solution auto-injector INJECT THE CONTENTS OF ONE PEN  SUBCUTANEOUSLY WEEKLY AS  DIRECTED INTO THE APPROPRIATE  AREAS 6 mL 3    multivitamin with minerals tablet tablet Take 1 tablet by mouth Daily.      omeprazole (priLOSEC) 20 MG capsule TAKE 1 CAPSULE DAILY 90 capsule 1    OneTouch Verio test strip USE TO CHECK BLOOD SUGAR UP TO 3 TIMES DAILY 300 each 3    rosuvastatin (CRESTOR) 5 MG tablet TAKE 1 TABLET BY MOUTH DAILY 90 tablet 3     No facility-administered medications prior to visit.     No opioid medication identified on active medication list. I have reviewed chart for other potential  high risk medication/s and harmful drug interactions in the elderly.      Aspirin is on active medication list. Aspirin use is indicated based on review of current medical condition/s. Pros and cons of this therapy have been discussed today. Benefits of this medication outweigh potential harm.  Patient has been encouraged to continue taking this medication.  .      Patient Active Problem List   Diagnosis    Type 2 diabetes mellitus with diabetic microalbuminuria, without long-term current use of insulin    Essential hypertension    Gastroesophageal reflux disease without esophagitis    Mixed hyperlipidemia    Seasonal allergic rhinitis due to pollen    Systolic murmur    Overweight (BMI 25.0-29.9)    Polyp of colon    Drug-induced erectile dysfunction    Elevated liver enzymes    PVC's (premature ventricular contractions)    Enlargement of abdominal aorta    Liver cyst    Type 2 diabetes mellitus with hyperglycemia, with long-term  "current use of insulin     Advance Care Planning Advance Directive is not on file.  ACP discussion was held with the patient during this visit. Patient does not have an advance directive, information provided.            Objective   Vitals:    25 0802   BP: 130/80   BP Location: Left arm   Patient Position: Sitting   Cuff Size: Adult   Pulse: 60   Temp: 98 °F (36.7 °C)   TempSrc: Temporal   Weight: 81.8 kg (180 lb 6.4 oz)   Height: 179 cm (70.47\")   PainSc: 0-No pain       Estimated body mass index is 25.54 kg/m² as calculated from the following:    Height as of this encounter: 179 cm (70.47\").    Weight as of this encounter: 81.8 kg (180 lb 6.4 oz).                Does the patient have evidence of cognitive impairment? No  Lab Results   Component Value Date    HGBA1C 6.3 (A) 2025                                                                                                Health  Risk Assessment    Smoking Status:  Social History     Tobacco Use   Smoking Status Former    Current packs/day: 0.00    Average packs/day: 0.5 packs/day for 20.0 years (10.0 ttl pk-yrs)    Types: Cigarettes    Start date: 1978    Quit date: 1998    Years since quittin.4   Smokeless Tobacco Never     Alcohol Consumption:  Social History     Substance and Sexual Activity   Alcohol Use Yes    Comment: occasional       Fall Risk Screen  STEADI Fall Risk Assessment was completed, and patient is at LOW risk for falls.Assessment completed on:2025    Depression Screening   Little interest or pleasure in doing things? Not at all   Feeling down, depressed, or hopeless? Not at all   PHQ-2 Total Score 0      Health Habits and Functional and Cognitive Screenin/22/2025    10:16 AM   Functional & Cognitive Status   Do you have difficulty preparing food and eating? No   Do you have difficulty bathing yourself, getting dressed or grooming yourself? No   Do you have difficulty using the toilet? No   Do you have " difficulty moving around from place to place? No   Do you have trouble with steps or getting out of a bed or a chair? No   Current Diet Well Balanced Diet   Dental Exam Up to date   Eye Exam Up to date   Exercise (times per week) 2 times per week   Current Exercises Include Gardening;Walking;Yard Work   Do you need help using the phone?  No   Are you deaf or do you have serious difficulty hearing?  No   Do you need help to go to places out of walking distance? No   Do you need help shopping? No   Do you need help preparing meals?  No   Do you need help with housework?  No   Do you need help with laundry? No   Do you need help taking your medications? No   Do you need help managing money? No   Do you ever drive or ride in a car without wearing a seat belt? No   Have you felt unusual stress, anger or loneliness in the last month? No   Who do you live with? Spouse   If you need help, do you have trouble finding someone available to you? No   Have you been bothered in the last four weeks by sexual problems? No   Do you have difficulty concentrating, remembering or making decisions? No           Age-appropriate Screening Schedule:  Refer to the list below for future screening recommendations based on patient's age, sex and/or medical conditions. Orders for these recommended tests are listed in the plan section. The patient has been provided with a written plan.    Health Maintenance List  Health Maintenance   Topic Date Due    ZOSTER VACCINE (1 of 2) Never done    TDAP/TD VACCINES (2 - Td or Tdap) 02/25/2021    COVID-19 Vaccine (3 - 2024-25 season) 09/01/2024    COLORECTAL CANCER SCREENING  06/08/2025    INFLUENZA VACCINE  07/01/2025    DIABETIC FOOT EXAM  08/28/2025    HEMOGLOBIN A1C  11/23/2025    LIPID PANEL  02/17/2026    URINE MICROALBUMIN-CREATININE RATIO (uACR)  02/17/2026    ANNUAL WELLNESS VISIT  05/23/2026    HEPATITIS C SCREENING  Completed    Pneumococcal Vaccine 50+  Completed    AAA SCREEN ONCE  Completed  "                                                                                                                                               CMS Preventative Services Quick Reference  Risk Factors Identified During Encounter  Immunizations Discussed/Encouraged: Influenza, Shingrix, COVID19, and RSV (Respiratory Syncytial Virus)    The above risks/problems have been discussed with the patient.  Pertinent information has been shared with the patient in the After Visit Summary.  An After Visit Summary and PPPS were made available to the patient.    Follow Up:   Next Medicare Wellness visit to be scheduled in 1 year.         Additional E&M Note during same encounter follows:  Patient has additional, significant, and separately identifiable condition(s)/problem(s) that require work above and beyond the Medicare Wellness Visit     Chief Complaint  Medicare Wellness-subsequent    Subjective   HPI  Armin is also being seen today for an annual adult preventative physical exam.  and Armin is also being seen today for additional medical problem/s.        Patient is here for follow-up.    We decreased the dose of Mounjaro from 10 mg to seven 7.5 m weekly on his last visit due to side effects, he had changed dosing interval to every 2 weeks on 10 mg dose.  After lowering the dose he has tolerated well.  He continues to use weight and has lost another 12 pounds since last visit in February.  He continues to use CGM freestyle maria fernanda 3, they told him there was a new CGM coming out and they would need a new order.    He previously was on another statin years ago, and had muscle side effects and also elevated liver enzymes.  He has not been on rosuvastatin 5 mg for couple of years by cardiology and is tolerating well.      Objective   Vital Signs:  /80 (BP Location: Left arm, Patient Position: Sitting, Cuff Size: Adult)   Pulse 60   Temp 98 °F (36.7 °C) (Temporal)   Ht 179 cm (70.47\")   Wt 81.8 kg (180 lb 6.4 oz)   BMI " 25.54 kg/m²   Physical Exam  Vitals reviewed.   Constitutional:       Appearance: Normal appearance.   HENT:      Head: Normocephalic and atraumatic.      Right Ear: Tympanic membrane, ear canal and external ear normal. There is no impacted cerumen.      Left Ear: Tympanic membrane, ear canal and external ear normal. There is no impacted cerumen.      Nose: Nose normal. No congestion.      Mouth/Throat:      Mouth: Mucous membranes are moist.      Pharynx: Oropharynx is clear.   Eyes:      Extraocular Movements: Extraocular movements intact.      Conjunctiva/sclera: Conjunctivae normal.   Cardiovascular:      Rate and Rhythm: Normal rate and regular rhythm.      Heart sounds: Murmur heard.      Comments: Systolic murmur over the right second intercostal space grade 3/6.  Pulmonary:      Effort: Pulmonary effort is normal.      Breath sounds: Normal breath sounds.   Abdominal:      General: There is no distension.      Palpations: Abdomen is soft. There is no mass.      Tenderness: There is no abdominal tenderness.   Musculoskeletal:      Cervical back: Neck supple. No tenderness.      Right lower leg: No edema.      Left lower leg: No edema.   Lymphadenopathy:      Cervical: No cervical adenopathy.   Skin:     General: Skin is warm and dry.   Neurological:      Mental Status: He is alert and oriented to person, place, and time. Mental status is at baseline.   Psychiatric:         Behavior: Behavior normal.         Thought Content: Thought content normal.                    Assessment and Plan      Encounter for subsequent annual wellness visit (AWV) in Medicare patient         Annual physical exam         Type 2 diabetes mellitus with diabetic microalbuminuria, without long-term current use of insulin      Orders:    POC Glycosylated Hemoglobin (Hb A1C)    Mixed hyperlipidemia            Essential hypertension           Overweight (BMI 25.0-29.9)  Patient's (Body mass index is 25.54 kg/m².) indicates that they are  overweight with health conditions that include hypertension, diabetes mellitus, dyslipidemias, and GERD . Weight is improving with treatment. BMI is above average; BMI management plan is completed. We discussed increasing exercise and pharmacologic options including continued use of Mounjaro for diabetes .           1. Encounter for subsequent annual wellness visit (AWV) in Medicare patient  2. Annual physical exam  Counseled on recommendations for annual flu vaccine, COVID booster, shingles vaccine and tetanus/Tdap vaccine with whooping cough every 10 years.  Patient declines vaccines at this time.    3. Type 2 diabetes mellitus with diabetic microalbuminuria, without long-term current use of insulin  Hemoglobin A1C   Date Value Ref Range Status   05/23/2025 6.3 (A) 4.5 - 5.7 % Final   01/17/2025 5.4 4.5 - 5.7 % Final   08/22/2024 8.6 (A) 4.5 - 5.7 % Final   02/04/2021 7.10 (H) 4.80 - 5.60 % Final   Good glycemic control.  A1c has increased, but diabetes is still well-controlled with A1c below 7.  For now continue on Mounjaro 7.5 mg weekly, appears to be tolerating well currently.  He has established care with endocrinology and will see them in July.  If he needs order for new CGM he will send me a [employer] message so I can order.  - POC Glycosylated Hemoglobin (Hb A1C)    4. Mixed hyperlipidemia  His liver enzymes are well within normal.  Previous elevated levels could be in setting of overweight/obesity.  For diabetics it is recommended to take higher dose of cholesterol-lowering medication, he is currently on rosuvastatin/Crestor 5 mg.  Typically 20 mg dose would be recommended.  We did discuss increasing the dose, but he wants to hold off for now.    5. Essential hypertension  BP Readings from Last 3 Encounters:   05/23/25 130/80   03/05/25 112/62   02/17/25 104/60   Blood pressure fairly well-controlled.  SBP on the border, however acceptable.  Continue on lisinopril hydrochlorothiazide 20-12.5 mg, metoprolol  XL 50 mg.  Follow-up in 6 months.    6. Overweight (BMI 25.0-29.9)  Wt Readings from Last 3 Encounters:   05/23/25 81.8 kg (180 lb 6.4 oz)   03/05/25 87.1 kg (192 lb)   02/17/25 87.8 kg (193 lb 9.6 oz)        Follow Up   Return in about 6 months (around 11/23/2025) for Recheck.    Future Appointments         Provider Department Center    7/29/2025 10:30 AM (Arrive by 10:15 AM) Wilfrido Calvillo MD [facility] ENDOCRINOLOGY [facility]    11/26/2025 9:00 AM (Arrive by 8:45 AM) Sav Mulligan MD [facility] INTERNAL MEDICINE [place]    3/25/2026 9:45 AM (Arrive by 9:30 AM) Melchor Waters MD [facility] CARDIOLOGY [place]              Patient was given instructions and counseling regarding his condition or for health maintenance advice. Please see specific information pulled into the AVS if appropriate.    Sav Mulligan MD

## 2025-05-23 NOTE — ASSESSMENT & PLAN NOTE
Patient's (Body mass index is 25.54 kg/m².) indicates that they are overweight with health conditions that include hypertension, diabetes mellitus, dyslipidemias, and GERD . Weight is improving with treatment. BMI is above average; BMI management plan is completed. We discussed increasing exercise and pharmacologic options including continued use of Mounjaro for diabetes.

## 2025-05-28 ENCOUNTER — PATIENT MESSAGE (OUTPATIENT)
Dept: INTERNAL MEDICINE | Facility: CLINIC | Age: 70
End: 2025-05-28
Payer: MEDICARE

## 2025-05-28 DIAGNOSIS — E11.29 TYPE 2 DIABETES MELLITUS WITH DIABETIC MICROALBUMINURIA, WITHOUT LONG-TERM CURRENT USE OF INSULIN: Primary | Chronic | ICD-10-CM

## 2025-05-28 DIAGNOSIS — R80.9 TYPE 2 DIABETES MELLITUS WITH DIABETIC MICROALBUMINURIA, WITHOUT LONG-TERM CURRENT USE OF INSULIN: Primary | Chronic | ICD-10-CM

## 2025-05-29 RX ORDER — HYDROCHLOROTHIAZIDE 12.5 MG/1
CAPSULE ORAL
Qty: 7 EACH | Refills: 3 | Status: SHIPPED | OUTPATIENT
Start: 2025-05-29

## 2025-06-04 DIAGNOSIS — I10 ESSENTIAL HYPERTENSION: Chronic | ICD-10-CM

## 2025-06-04 DIAGNOSIS — E11.9 TYPE 2 DIABETES MELLITUS WITHOUT COMPLICATION, WITHOUT LONG-TERM CURRENT USE OF INSULIN: ICD-10-CM

## 2025-06-04 RX ORDER — LISINOPRIL AND HYDROCHLOROTHIAZIDE 12.5; 2 MG/1; MG/1
1 TABLET ORAL DAILY
Qty: 90 TABLET | Refills: 3 | Status: SHIPPED | OUTPATIENT
Start: 2025-06-04

## 2025-06-04 RX ORDER — ROSUVASTATIN CALCIUM 5 MG/1
5 TABLET, COATED ORAL DAILY
Qty: 90 TABLET | Refills: 3 | Status: SHIPPED | OUTPATIENT
Start: 2025-06-04

## 2025-07-29 ENCOUNTER — OFFICE VISIT (OUTPATIENT)
Dept: ENDOCRINOLOGY | Facility: CLINIC | Age: 70
End: 2025-07-29
Payer: MEDICARE

## 2025-07-29 VITALS
SYSTOLIC BLOOD PRESSURE: 114 MMHG | WEIGHT: 180 LBS | OXYGEN SATURATION: 98 % | DIASTOLIC BLOOD PRESSURE: 72 MMHG | BODY MASS INDEX: 25.77 KG/M2 | HEART RATE: 55 BPM | HEIGHT: 70 IN

## 2025-07-29 DIAGNOSIS — Z79.4 TYPE 2 DIABETES MELLITUS WITH HYPERGLYCEMIA, WITH LONG-TERM CURRENT USE OF INSULIN: Primary | ICD-10-CM

## 2025-07-29 DIAGNOSIS — E11.29 TYPE 2 DIABETES MELLITUS WITH DIABETIC MICROALBUMINURIA, WITHOUT LONG-TERM CURRENT USE OF INSULIN: Chronic | ICD-10-CM

## 2025-07-29 DIAGNOSIS — E78.2 MIXED HYPERLIPIDEMIA: Chronic | ICD-10-CM

## 2025-07-29 DIAGNOSIS — E11.65 TYPE 2 DIABETES MELLITUS WITH HYPERGLYCEMIA, WITH LONG-TERM CURRENT USE OF INSULIN: Primary | ICD-10-CM

## 2025-07-29 DIAGNOSIS — R80.9 TYPE 2 DIABETES MELLITUS WITH DIABETIC MICROALBUMINURIA, WITHOUT LONG-TERM CURRENT USE OF INSULIN: Chronic | ICD-10-CM

## 2025-07-29 DIAGNOSIS — I10 ESSENTIAL HYPERTENSION: Chronic | ICD-10-CM

## 2025-07-29 LAB
EXPIRATION DATE: ABNORMAL
GLUCOSE BLDC GLUCOMTR-MCNC: 141 MG/DL (ref 70–130)
Lab: ABNORMAL

## 2025-07-29 PROCEDURE — 82570 ASSAY OF URINE CREATININE: CPT | Performed by: INTERNAL MEDICINE

## 2025-07-29 PROCEDURE — 82043 UR ALBUMIN QUANTITATIVE: CPT | Performed by: INTERNAL MEDICINE

## 2025-07-29 NOTE — ASSESSMENT & PLAN NOTE
A1c looks good.  Continue current meds.  Plan to recheck diabetes in 6 months.    FreeStyle Saeid 3 CGM was downloaded today.  Data was reviewed from 7/16/25 to 7/29/25.  This showed good glucose control.  Rare postprandial spike.  Time in range was 97%.

## 2025-07-29 NOTE — PROGRESS NOTES
"     Office Note      Date: 2025  Patient Name: Rohan Evans Jr.  MRN: [id number]  : 1955    Chief Complaint   Patient presents with    Diabetes     Type 2 diabetes mellitus with hyperglycemia, with long-term current use of insulin    Hypertension       History of Present Illness:   Rohan Evasn Jr. is a 70 y.o. male who presents for Diabetes type 2. Diagnosed in: . Treated in past with oral agents. He had trouble tolerating ozempic.  Current treatments: metformin, mounjaro and farxiga.  Number of insulin shots per day: none.  Checks blood sugar 288 times a day - on FreeStyle Saeid 3. Has low blood sugar: none. Aspirin use: Yes. Statin use: Yes. ACE-I/ARB use: Yes.  Change in health since last visit: none.  Last eye exam: 2025.     Subjective      Diabetic Complications:  Eyes: No  Kidneys: Yes - microalbumin  Feet: No  Heart: No    Diet and Exercise:  Meals per day: 2  Minutes of exercise per week: 0 mins.  But active.    Review of Systems:   Review of Systems   Constitutional: Negative.    Cardiovascular: Negative.    Gastrointestinal: Negative.    Endocrine: Negative.        The following portions of the patient's history were reviewed and updated as appropriate: allergies, current medications, past family history, past medical history, past social history, past surgical history, and problem list.    Objective     Visit Vitals  /72 (BP Location: Right arm, Patient Position: Sitting, Cuff Size: Adult)   Pulse 55   Ht 177.8 cm (70\")   Wt 81.6 kg (180 lb)   SpO2 98%   BMI 25.83 kg/m²       Physical Exam:  Physical Exam  Constitutional:       Appearance: Normal appearance.   Neurological:      Mental Status: He is alert.         Labs:    HbA1c  Lab Results   Component Value Date    HGBA1C 6.3 (A) 2025       CMP  Lab Results   Component Value Date    GLUCOSE 134 (H) 2025    BUN 20 2025    CREATININE 1.17 2025    EGFRIFNONA 79 2021    BCR 17.1 " "02/17/2025    K 4.3 02/17/2025    CO2 27.7 02/17/2025    CALCIUM 10.3 02/17/2025    AST 22 02/17/2025    ALT 16 02/17/2025        Lipid Panel  Lab Results   Component Value Date    HDL Cholesterol 47 02/17/2025    LDL Cholesterol  90 02/17/2025    LDL/HDL Ratio 1.85 02/17/2025    Triglycerides 126 02/17/2025        TSH  Lab Results   Component Value Date    TSH 2.030 02/17/2025        Hemoglobin A1C  No components found for: \"HGBA1C\"     Microalbumin/Creatinine  Lab Results   Component Value Date    MALBCRERATIO  01/17/2025      Comment:      Unable to calculate    MICROALBUR <1.2 01/17/2025           Assessment / Plan      Assessment & Plan:  Diagnoses and all orders for this visit:    1. Type 2 diabetes mellitus with hyperglycemia, with long-term current use of insulin (Primary)  Assessment & Plan:  A1c looks good.  Continue current meds.  Plan to recheck diabetes in 6 months.    FreeStyle Saeid 3 CGM was downloaded today.  Data was reviewed from 7/16/25 to 7/29/25.  This showed good glucose control.  Rare postprandial spike.  Time in range was 97%.    Orders:  -     POC Glucose, Blood  -     Microalbumin / Creatinine Urine Ratio - Urine, Clean Catch; Future    2. Essential hypertension  Assessment & Plan:  Hypertension is stable and controlled.  Continue current treatment regimen.  Blood pressure will be reassessed in 6 months.      3. Mixed hyperlipidemia  Assessment & Plan:  Continue statin.  Last lipids were okay.      4. Type 2 diabetes mellitus with diabetic microalbuminuria, without long-term current use of insulin  Assessment & Plan:  Continue ACE-I and SGLT-2 inhibitor.  Check microalbumin today.        Current Outpatient Medications   Medication Instructions    aspirin 81 mg, Oral, Daily    Continuous Glucose Sensor (FreeStyle Saeid 3 Plus Sensor) Not Applicable, Every 14 Days    Cyanocobalamin (B-12) 1000 MCG capsule Take  by mouth.    dapagliflozin Propanediol (FARXIGA) 10 mg, Oral, Daily    Lancets " Misc. (Accu-Chek FastClix Lancet) kit Use to check blood sugar up to three times a day    lisinopril-hydrochlorothiazide (PRINZIDE,ZESTORETIC) 20-12.5 MG per tablet 1 tablet, Oral, Daily    metFORMIN (GLUCOPHAGE) 1,000 mg, Oral, 2 Times Daily With Meals    metoprolol succinate XL (TOPROL-XL) 75 mg, Oral, Daily    montelukast (SINGULAIR) 10 mg, Oral, Daily    Mounjaro 7.5 MG/0.5ML solution auto-injector INJECT THE CONTENTS OF ONE PEN  SUBCUTANEOUSLY WEEKLY AS  DIRECTED INTO THE APPROPRIATE  AREAS    multivitamin with minerals tablet tablet 1 tablet, Daily    omeprazole (priLOSEC) 20 MG capsule TAKE 1 CAPSULE DAILY    OneTouch Verio test strip USE TO CHECK BLOOD SUGAR UP TO 3 TIMES DAILY    rosuvastatin (CRESTOR) 5 mg, Oral, Daily      Return in about 6 months (around 1/29/2026) for Recheck with A1c, CMP, lipid, TSH, microalbumin, foot exam.    Electronically signed by: Wilfrido Calvillo MD  07/29/2025

## 2025-07-30 LAB
ALBUMIN UR-MCNC: <1.2 MG/DL
CREAT UR-MCNC: 107.4 MG/DL
MICROALBUMIN/CREAT UR: NORMAL MG/G{CREAT}

## 2025-07-31 ENCOUNTER — RESULTS FOLLOW-UP (OUTPATIENT)
Dept: ENDOCRINOLOGY | Facility: CLINIC | Age: 70
End: 2025-07-31
Payer: MEDICARE

## 2025-08-19 RX ORDER — SODIUM PICOSULFATE, MAGNESIUM OXIDE, AND ANHYDROUS CITRIC ACID 10; 3.5; 12 MG/160ML; G/160ML; G/160ML
350 LIQUID ORAL TAKE AS DIRECTED
Qty: 350 ML | Refills: 0 | Status: SHIPPED | OUTPATIENT
Start: 2025-08-19